# Patient Record
Sex: FEMALE | Race: OTHER | NOT HISPANIC OR LATINO | Employment: UNEMPLOYED | URBAN - METROPOLITAN AREA
[De-identification: names, ages, dates, MRNs, and addresses within clinical notes are randomized per-mention and may not be internally consistent; named-entity substitution may affect disease eponyms.]

---

## 2020-01-15 ENCOUNTER — EVALUATION (OUTPATIENT)
Dept: PHYSICAL THERAPY | Facility: CLINIC | Age: 55
End: 2020-01-15
Payer: MEDICARE

## 2020-01-15 ENCOUNTER — TRANSCRIBE ORDERS (OUTPATIENT)
Dept: PHYSICAL THERAPY | Facility: CLINIC | Age: 55
End: 2020-01-15

## 2020-01-15 DIAGNOSIS — I89.0 CHRONIC ACQUIRED LYMPHEDEMA: Primary | ICD-10-CM

## 2020-01-15 DIAGNOSIS — I89.0 LYMPHEDEMA: Primary | ICD-10-CM

## 2020-01-15 PROCEDURE — 97163 PT EVAL HIGH COMPLEX 45 MIN: CPT | Performed by: PHYSICAL THERAPIST

## 2020-01-15 NOTE — PROGRESS NOTES
PT Evaluation      Today's date: 1/15/2020  Patient name: Judson Becerra  : 1965  MRN: [id number]  Referring provider: Severo Hickman MD  Dx:   Encounter Diagnosis     ICD-10-CM    1  Lymphedema I89 0                   Assessment  Assessment details: Pt presents this day for assessment  Physical examination and review of PMHx + medical records collaborate diagnosis of R UE acquired lymphedema following R breast lumpectomy and ALND (11 LN per pt report) in   Pt has had PMHx of skilled CDT including MLD and compression to optimize volume reduction of R UE edema and not manages properly by executing phase 2 CDT protocol- self management, including daily usage of R UE compression pump complimented by daytime compression sleeve and daily skin hydration maintenance including self checks to minimize infection risk  Pt is well educated in self management of her lymphedema diagnosis and maintains R UE girth measurements comparable to her unaffected limb, however palpable heaviness is observed in R posterior upper arm and also along lateral aspect of forearm - common regions for UE edema to accumulate  Pt's report of fluctuating edema is common for phase 1 to phase 2 lymphedema which requires regular care to prevent worsening of sx's  Primary concern is pt's need to maintain adequate compression thru use of daytime compression sleeves which per standards of care suggest fitment of new garments within every 6 months  Also recommend repeat measurement f/u as needed  Impairments: activity intolerance  Other impairment: R UE edema  Understanding of Dx/Px/POC: good   Prognosis: good    Goals  ST  Pt maintains I in phase 2 CDT protocol   2  Pt f/u for repeat measurments prn  3   Pt to obtain new, appropriate compression sleeve for R UE within 3 wks    Plan  Planned therapy interventions: compression and manual therapy  Frequency: 1x month  Duration in visits: 1  Duration in weeks: 4  Plan of Care beginning date: 1/15/2020  Plan of Care expiration date: 2020  Treatment plan discussed with: patient        Subjective Evaluation    History of Present Illness  Mechanism of injury: surgery  Mechanism of injury: Pt presents admitting chronic hx of R UE lymphedema directly related to her R breast CA in '05 w/ lumpectomy and ALND performed to address  Pt also admits mets to her liver and spine along w/ a chronic CHF associated w/ her initial CA diagnosis  Currently maintains chemotherapy Rx medication and diuretic as needed  Pt reports daily usage of R UE compression pump along w/ maintaining R UE compression sleeve daily  Concerned her garments are wearing out and not providing as effective compression due to the age of garment combined w/ wear from diligent daily use  Pt is R hand dominant  Admits bouts of tendonitis and also PN from elbow to wrist w/ tends to coincide w/ exacerbations of worsened edema  Often times sx's regarding edema and PN worsen dependent upon activity level which can limit functionality of R UE  Pain  Current pain rating: 3  At best pain ratin  At worst pain ratin  Quality: dull ache, tight and discomfort  Aggravating factors: lifting  Progression: worsening    Social Support    Employment status: not working  Hand dominance: right      Diagnostic Tests  No diagnostic tests performed  Patient Goals  Patient goals for therapy: decreased edema  Patient goal: Manage R UE lymphedema         Objective     General Comments: Ankle/Foot Comments   Physical assessment:  Good visualization of R wrist bony prominences, slight obscurity along dorsum of hand, R extensor tendons w/ slight reduction in creases of fingers compared to L UE  Palpation: Mild heaviness palpable R posterior upper arm (triceps region) w/ also mild heaviness R lateral to dorsal forearm    Skin Mobility: Good thru majority of R UE, Fair R forearm  Skin color: WNL skin pigmentation, Moderate dryness of skin  Pitting: trace  Wound presence: None  Wound size: n/a  Wound color: n/a  Stemmer's Sign:  -   Gait assessment: WNL  Transfer status: I  Ability to don/doff clothing/garments: I             Precautions: Hx R breast CA         Manual                                                                                   Exercise Diary                                                                                                                                                                                                                                                                                      Modalities

## 2020-01-15 NOTE — LETTER
January 15, 2020    Samina Pardo MD   [facility]  [address] [place] [id number]    Patient: Devi Coles   YOB: 1965   Date of Visit: 1/15/2020     Encounter Diagnosis     ICD-10-CM    1  Lymphedema I89 0        Dear Dr Leanna Romeo: Thank you for your recent referral of Devi Coles  Please review the attached evaluation summary from Kayley's recent visit  Please verify that you agree with the plan of care by signing the attached order  If you have any questions or concerns, please do not hesitate to call  I sincerely appreciate the opportunity to share in the care of one of your patients and hope to have another opportunity to work with you in the near future  Sincerely,    Martinez Castro, PT      Referring Provider:      I certify that I have read the below Plan of Care and certify the need for these services furnished under this plan of treatment while under my care  Samina Pardo MD  [address]  [place] [id number]  VIA Facsimile: [phone]          PT Evaluation      Today's date: 1/15/2020  Patient name: Devi Coles  : 1965  MRN: [id number]  Referring provider: Chele Jimenez MD  Dx:   Encounter Diagnosis     ICD-10-CM    1  Lymphedema I89 0                   Assessment  Assessment details: Pt presents this day for assessment  Physical examination and review of PMHx + medical records collaborate diagnosis of R UE acquired lymphedema following R breast lumpectomy and ALND (11 LN per pt report) in   Pt has had PMHx of skilled CDT including MLD and compression to optimize volume reduction of R UE edema and not manages properly by executing phase 2 CDT protocol- self management, including daily usage of R UE compression pump complimented by daytime compression sleeve and daily skin hydration maintenance including self checks to minimize infection risk    Pt is well educated in self management of her lymphedema diagnosis and maintains R UE girth measurements comparable to her unaffected limb, however palpable heaviness is observed in R posterior upper arm and also along lateral aspect of forearm - common regions for UE edema to accumulate  Pt's report of fluctuating edema is common for phase 1 to phase 2 lymphedema which requires regular care to prevent worsening of sx's  Primary concern is pt's need to maintain adequate compression thru use of daytime compression sleeves which per standards of care suggest fitment of new garments within every 6 months  Also recommend repeat measurement f/u as needed  Impairments: activity intolerance  Other impairment: R UE edema  Understanding of Dx/Px/POC: good   Prognosis: good    Goals  ST  Pt maintains I in phase 2 CDT protocol   2  Pt f/u for repeat measurments prn  3  Pt to obtain new, appropriate compression sleeve for R UE within 3 wks    Plan  Planned therapy interventions: compression and manual therapy  Frequency: 1x month  Duration in visits: 1  Duration in weeks: 4  Plan of Care beginning date: 1/15/2020  Plan of Care expiration date: 2020  Treatment plan discussed with: patient        Subjective Evaluation    History of Present Illness  Mechanism of injury: surgery  Mechanism of injury: Pt presents admitting chronic hx of R UE lymphedema directly related to her R breast CA in '05 w/ lumpectomy and ALND performed to address  Pt also admits mets to her liver and spine along w/ a chronic CHF associated w/ her initial CA diagnosis  Currently maintains chemotherapy Rx medication and diuretic as needed  Pt reports daily usage of R UE compression pump along w/ maintaining R UE compression sleeve daily  Concerned her garments are wearing out and not providing as effective compression due to the age of garment combined w/ wear from diligent daily use  Pt is R hand dominant    Admits bouts of tendonitis and also PN from elbow to wrist w/ tends to coincide w/ exacerbations of worsened edema  Often times sx's regarding edema and PN worsen dependent upon activity level which can limit functionality of R UE  Pain  Current pain rating: 3  At best pain ratin  At worst pain ratin  Quality: dull ache, tight and discomfort  Aggravating factors: lifting  Progression: worsening    Social Support    Employment status: not working  Hand dominance: right      Diagnostic Tests  No diagnostic tests performed  Patient Goals  Patient goals for therapy: decreased edema  Patient goal: Manage R UE lymphedema         Objective     General Comments: Ankle/Foot Comments   Physical assessment:  Good visualization of R wrist bony prominences, slight obscurity along dorsum of hand, R extensor tendons w/ slight reduction in creases of fingers compared to L UE  Palpation: Mild heaviness palpable R posterior upper arm (triceps region) w/ also mild heaviness R lateral to dorsal forearm  Skin Mobility: Good thru majority of R UE, Fair R forearm  Skin color: WNL skin pigmentation, Moderate dryness of skin  Pitting: trace  Wound presence: None  Wound size: n/a  Wound color: n/a  Stemmer's Sign:  -   Gait assessment: WNL  Transfer status: I  Ability to don/doff clothing/garments: I             Precautions: Hx R breast CA         Manual                                                                                   Exercise Diary                                                                                                                                                                                                                                                                                      Modalities

## 2020-01-29 ENCOUNTER — APPOINTMENT (EMERGENCY)
Dept: RADIOLOGY | Facility: HOSPITAL | Age: 55
DRG: 308 | End: 2020-01-29
Payer: MEDICARE

## 2020-01-29 ENCOUNTER — HOSPITAL ENCOUNTER (INPATIENT)
Facility: HOSPITAL | Age: 55
LOS: 13 days | Discharge: NURSING, REHAB OR LONG-TERM CARE | DRG: 308 | End: 2020-02-11
Attending: EMERGENCY MEDICINE | Admitting: EMERGENCY MEDICINE
Payer: MEDICARE

## 2020-01-29 DIAGNOSIS — Z51.5 PALLIATIVE CARE PATIENT: ICD-10-CM

## 2020-01-29 DIAGNOSIS — R79.89 ELEVATED LFTS: ICD-10-CM

## 2020-01-29 DIAGNOSIS — N17.9 AKI (ACUTE KIDNEY INJURY) (HCC): ICD-10-CM

## 2020-01-29 DIAGNOSIS — C50.919 METASTATIC BREAST CANCER (HCC): ICD-10-CM

## 2020-01-29 DIAGNOSIS — I47.2 V TACH (HCC): Primary | ICD-10-CM

## 2020-01-29 DIAGNOSIS — E87.1 HYPONATREMIA: ICD-10-CM

## 2020-01-29 DIAGNOSIS — I42.7 CARDIOMYOPATHY SECONDARY TO DRUG (HCC): ICD-10-CM

## 2020-01-29 DIAGNOSIS — R06.00 DOE (DYSPNEA ON EXERTION): ICD-10-CM

## 2020-01-29 DIAGNOSIS — I50.22 CHRONIC SYSTOLIC HEART FAILURE (HCC): ICD-10-CM

## 2020-01-29 DIAGNOSIS — Z51.5 HOSPICE CARE PATIENT: ICD-10-CM

## 2020-01-29 DIAGNOSIS — I95.9 HYPOTENSION: ICD-10-CM

## 2020-01-29 DIAGNOSIS — G89.3 CANCER ASSOCIATED PAIN: ICD-10-CM

## 2020-01-29 PROBLEM — R06.02 SOB (SHORTNESS OF BREATH): Status: ACTIVE | Noted: 2020-01-29

## 2020-01-29 PROBLEM — R79.1 SUPRATHERAPEUTIC INR: Status: ACTIVE | Noted: 2020-01-29

## 2020-01-29 PROBLEM — E11.9 TYPE 2 DIABETES MELLITUS WITHOUT COMPLICATION, WITHOUT LONG-TERM CURRENT USE OF INSULIN (HCC): Status: ACTIVE | Noted: 2020-01-29

## 2020-01-29 PROBLEM — I48.20 CHRONIC ATRIAL FIBRILLATION (HCC): Status: ACTIVE | Noted: 2020-01-29

## 2020-01-29 LAB
ALBUMIN SERPL BCP-MCNC: 2.2 G/DL (ref 3.5–5)
ALP SERPL-CCNC: 1165 U/L (ref 46–116)
ALT SERPL W P-5'-P-CCNC: 205 U/L (ref 12–78)
ANION GAP SERPL CALCULATED.3IONS-SCNC: 7 MMOL/L (ref 4–13)
APTT PPP: 89 SECONDS (ref 23–37)
AST SERPL W P-5'-P-CCNC: 519 U/L (ref 5–45)
ATRIAL RATE: 84 BPM
BASOPHILS # BLD MANUAL: 0 THOUSAND/UL (ref 0–0.1)
BASOPHILS NFR MAR MANUAL: 0 % (ref 0–1)
BILIRUB SERPL-MCNC: 4.11 MG/DL (ref 0.2–1)
BUN SERPL-MCNC: 25 MG/DL (ref 5–25)
CALCIUM SERPL-MCNC: 9.9 MG/DL (ref 8.3–10.1)
CHLORIDE SERPL-SCNC: 99 MMOL/L (ref 100–108)
CO2 SERPL-SCNC: 26 MMOL/L (ref 21–32)
CREAT SERPL-MCNC: 1.11 MG/DL (ref 0.6–1.3)
EOSINOPHIL # BLD MANUAL: 0.66 THOUSAND/UL (ref 0–0.4)
EOSINOPHIL NFR BLD MANUAL: 3 % (ref 0–6)
ERYTHROCYTE [DISTWIDTH] IN BLOOD BY AUTOMATED COUNT: 15.7 % (ref 11.6–15.1)
GFR SERPL CREATININE-BSD FRML MDRD: 57 ML/MIN/1.73SQ M
GLUCOSE SERPL-MCNC: 173 MG/DL (ref 65–140)
GLUCOSE SERPL-MCNC: 183 MG/DL (ref 65–140)
HCT VFR BLD AUTO: 30.8 % (ref 34.8–46.1)
HGB BLD-MCNC: 10.2 G/DL (ref 11.5–15.4)
INR PPP: 2.16 (ref 0.84–1.19)
INR PPP: >15 (ref 0.84–1.19)
LYMPHOCYTES # BLD AUTO: 12 % (ref 14–44)
LYMPHOCYTES # BLD AUTO: 2.65 THOUSAND/UL (ref 0.6–4.47)
MCH RBC QN AUTO: 34.8 PG (ref 26.8–34.3)
MCHC RBC AUTO-ENTMCNC: 33.1 G/DL (ref 31.4–37.4)
MCV RBC AUTO: 105 FL (ref 82–98)
METAMYELOCYTES NFR BLD MANUAL: 1 % (ref 0–1)
MONOCYTES # BLD AUTO: 2.21 THOUSAND/UL (ref 0–1.22)
MONOCYTES NFR BLD: 10 % (ref 4–12)
MYELOCYTES NFR BLD MANUAL: 2 % (ref 0–1)
NEUTROPHILS # BLD MANUAL: 15.9 THOUSAND/UL (ref 1.85–7.62)
NEUTS BAND NFR BLD MANUAL: 2 % (ref 0–8)
NEUTS SEG NFR BLD AUTO: 70 % (ref 43–75)
NRBC BLD AUTO-RTO: 3 /100 WBCS
NRBC BLD AUTO-RTO: 4 /100 WBC (ref 0–2)
P AXIS: 68 DEGREES
PLATELET # BLD AUTO: 328 THOUSANDS/UL (ref 149–390)
PLATELET BLD QL SMEAR: ADEQUATE
PMV BLD AUTO: 11 FL (ref 8.9–12.7)
POLYCHROMASIA BLD QL SMEAR: PRESENT
POTASSIUM SERPL-SCNC: 5.1 MMOL/L (ref 3.5–5.3)
PR INTERVAL: 124 MS
PROT SERPL-MCNC: 7.2 G/DL (ref 6.4–8.2)
PROTHROMBIN TIME: 23.6 SECONDS (ref 11.6–14.5)
PROTHROMBIN TIME: >120 SECONDS (ref 11.6–14.5)
QRS AXIS: -67 DEGREES
QRSD INTERVAL: 130 MS
QT INTERVAL: 380 MS
QTC INTERVAL: 449 MS
RBC # BLD AUTO: 2.93 MILLION/UL (ref 3.81–5.12)
RBC MORPH BLD: PRESENT
SODIUM SERPL-SCNC: 132 MMOL/L (ref 136–145)
T WAVE AXIS: 85 DEGREES
TROPONIN I SERPL-MCNC: <0.02 NG/ML
VENTRICULAR RATE: 84 BPM
WBC # BLD AUTO: 22.09 THOUSAND/UL (ref 4.31–10.16)

## 2020-01-29 PROCEDURE — 99223 1ST HOSP IP/OBS HIGH 75: CPT | Performed by: INTERNAL MEDICINE

## 2020-01-29 PROCEDURE — 85610 PROTHROMBIN TIME: CPT | Performed by: INTERNAL MEDICINE

## 2020-01-29 PROCEDURE — 36415 COLL VENOUS BLD VENIPUNCTURE: CPT | Performed by: EMERGENCY MEDICINE

## 2020-01-29 PROCEDURE — 82948 REAGENT STRIP/BLOOD GLUCOSE: CPT

## 2020-01-29 PROCEDURE — 71045 X-RAY EXAM CHEST 1 VIEW: CPT

## 2020-01-29 PROCEDURE — 99222 1ST HOSP IP/OBS MODERATE 55: CPT | Performed by: INTERNAL MEDICINE

## 2020-01-29 PROCEDURE — 99285 EMERGENCY DEPT VISIT HI MDM: CPT

## 2020-01-29 PROCEDURE — 84484 ASSAY OF TROPONIN QUANT: CPT | Performed by: EMERGENCY MEDICINE

## 2020-01-29 PROCEDURE — 80053 COMPREHEN METABOLIC PANEL: CPT | Performed by: EMERGENCY MEDICINE

## 2020-01-29 PROCEDURE — 85007 BL SMEAR W/DIFF WBC COUNT: CPT | Performed by: EMERGENCY MEDICINE

## 2020-01-29 PROCEDURE — 93005 ELECTROCARDIOGRAM TRACING: CPT

## 2020-01-29 PROCEDURE — 85730 THROMBOPLASTIN TIME PARTIAL: CPT | Performed by: EMERGENCY MEDICINE

## 2020-01-29 PROCEDURE — 85027 COMPLETE CBC AUTOMATED: CPT | Performed by: EMERGENCY MEDICINE

## 2020-01-29 PROCEDURE — 99285 EMERGENCY DEPT VISIT HI MDM: CPT | Performed by: EMERGENCY MEDICINE

## 2020-01-29 PROCEDURE — 85610 PROTHROMBIN TIME: CPT | Performed by: EMERGENCY MEDICINE

## 2020-01-29 PROCEDURE — 93010 ELECTROCARDIOGRAM REPORT: CPT | Performed by: INTERNAL MEDICINE

## 2020-01-29 RX ORDER — SPIRONOLACTONE 50 MG/1
50 TABLET, FILM COATED ORAL DAILY
Status: DISCONTINUED | OUTPATIENT
Start: 2020-01-30 | End: 2020-01-30

## 2020-01-29 RX ORDER — PANTOPRAZOLE SODIUM 40 MG/1
40 TABLET, DELAYED RELEASE ORAL
Status: DISCONTINUED | OUTPATIENT
Start: 2020-01-30 | End: 2020-02-11 | Stop reason: HOSPADM

## 2020-01-29 RX ORDER — CEPHALEXIN 500 MG/1
500 CAPSULE ORAL EVERY 6 HOURS SCHEDULED
COMMUNITY
End: 2020-02-11 | Stop reason: HOSPADM

## 2020-01-29 RX ORDER — METHYLPREDNISOLONE 4 MG/1
4 TABLET ORAL 2 TIMES DAILY
COMMUNITY
Start: 2020-01-21 | End: 2020-02-11 | Stop reason: HOSPADM

## 2020-01-29 RX ORDER — LOSARTAN POTASSIUM 25 MG/1
25 TABLET ORAL DAILY
Status: DISCONTINUED | OUTPATIENT
Start: 2020-01-30 | End: 2020-01-31

## 2020-01-29 RX ORDER — DULOXETIN HYDROCHLORIDE 30 MG/1
30 CAPSULE, DELAYED RELEASE ORAL DAILY
COMMUNITY

## 2020-01-29 RX ORDER — PRAVASTATIN SODIUM 40 MG
40 TABLET ORAL
Status: DISCONTINUED | OUTPATIENT
Start: 2020-01-30 | End: 2020-01-29

## 2020-01-29 RX ORDER — OXYCODONE HYDROCHLORIDE 5 MG/1
5 CAPSULE ORAL EVERY 4 HOURS PRN
COMMUNITY
End: 2020-02-11 | Stop reason: HOSPADM

## 2020-01-29 RX ORDER — ASPIRIN 81 MG/1
81 TABLET ORAL DAILY
Status: DISCONTINUED | OUTPATIENT
Start: 2020-01-30 | End: 2020-02-11 | Stop reason: HOSPADM

## 2020-01-29 RX ORDER — ROSUVASTATIN CALCIUM 5 MG/1
5 TABLET, COATED ORAL DAILY
COMMUNITY

## 2020-01-29 RX ORDER — CARVEDILOL 6.25 MG/1
6.25 TABLET ORAL 2 TIMES DAILY WITH MEALS
COMMUNITY
End: 2020-02-11 | Stop reason: HOSPADM

## 2020-01-29 RX ORDER — TORSEMIDE 20 MG/1
20 TABLET ORAL DAILY PRN
COMMUNITY
End: 2020-02-11 | Stop reason: HOSPADM

## 2020-01-29 RX ORDER — POLYETHYLENE GLYCOL 3350 17 G/17G
17 POWDER, FOR SOLUTION ORAL DAILY
Status: DISCONTINUED | OUTPATIENT
Start: 2020-01-30 | End: 2020-02-11 | Stop reason: HOSPADM

## 2020-01-29 RX ORDER — POTASSIUM CHLORIDE 750 MG/1
20 TABLET, EXTENDED RELEASE ORAL DAILY
COMMUNITY
End: 2020-02-11 | Stop reason: HOSPADM

## 2020-01-29 RX ORDER — SPIRONOLACTONE 50 MG/1
50 TABLET, FILM COATED ORAL DAILY
COMMUNITY
End: 2020-02-11 | Stop reason: HOSPADM

## 2020-01-29 RX ORDER — OXYCODONE HYDROCHLORIDE 10 MG/1
10 TABLET ORAL EVERY 6 HOURS PRN
Status: DISCONTINUED | OUTPATIENT
Start: 2020-01-29 | End: 2020-01-31

## 2020-01-29 RX ORDER — LEVOTHYROXINE SODIUM 88 UG/1
88 TABLET ORAL
Status: DISCONTINUED | OUTPATIENT
Start: 2020-01-30 | End: 2020-02-11 | Stop reason: HOSPADM

## 2020-01-29 RX ORDER — POLYETHYLENE GLYCOL 3350 17 G/17G
17 POWDER, FOR SOLUTION ORAL DAILY
COMMUNITY

## 2020-01-29 RX ORDER — ASPIRIN 81 MG/1
81 TABLET ORAL DAILY
COMMUNITY

## 2020-01-29 RX ORDER — DEXLANSOPRAZOLE 60 MG/1
60 CAPSULE, DELAYED RELEASE ORAL DAILY
COMMUNITY

## 2020-01-29 RX ORDER — DIGOXIN 250 MCG
125 TABLET ORAL DAILY
COMMUNITY
End: 2020-02-11 | Stop reason: HOSPADM

## 2020-01-29 RX ORDER — POTASSIUM CHLORIDE 20 MEQ/1
20 TABLET, EXTENDED RELEASE ORAL DAILY
Status: DISCONTINUED | OUTPATIENT
Start: 2020-01-30 | End: 2020-01-31

## 2020-01-29 RX ORDER — MONTELUKAST SODIUM 10 MG/1
10 TABLET ORAL
Status: DISCONTINUED | OUTPATIENT
Start: 2020-01-29 | End: 2020-02-11 | Stop reason: HOSPADM

## 2020-01-29 RX ORDER — MONTELUKAST SODIUM 10 MG/1
10 TABLET ORAL
COMMUNITY

## 2020-01-29 RX ORDER — LEVOTHYROXINE SODIUM 0.07 MG/1
88 TABLET ORAL DAILY
COMMUNITY

## 2020-01-29 RX ORDER — MELATONIN
5000 DAILY
COMMUNITY

## 2020-01-29 RX ORDER — MEXILETINE HYDROCHLORIDE 150 MG/1
150 CAPSULE ORAL 3 TIMES DAILY
Status: DISCONTINUED | OUTPATIENT
Start: 2020-01-29 | End: 2020-02-11 | Stop reason: HOSPADM

## 2020-01-29 RX ORDER — TORSEMIDE 20 MG/1
20 TABLET ORAL DAILY PRN
Status: DISCONTINUED | OUTPATIENT
Start: 2020-01-29 | End: 2020-01-31

## 2020-01-29 RX ORDER — WARFARIN SODIUM 5 MG/1
TABLET ORAL
COMMUNITY

## 2020-01-29 RX ORDER — ONDANSETRON 2 MG/ML
1 INJECTION INTRAMUSCULAR; INTRAVENOUS ONCE
Status: COMPLETED | OUTPATIENT
Start: 2020-01-29 | End: 2020-01-29

## 2020-01-29 RX ORDER — MEXILETINE HYDROCHLORIDE 150 MG/1
150 CAPSULE ORAL 3 TIMES DAILY
COMMUNITY

## 2020-01-29 RX ORDER — CARVEDILOL 6.25 MG/1
6.25 TABLET ORAL 2 TIMES DAILY WITH MEALS
Status: DISCONTINUED | OUTPATIENT
Start: 2020-01-30 | End: 2020-02-01

## 2020-01-29 RX ORDER — DULOXETIN HYDROCHLORIDE 30 MG/1
30 CAPSULE, DELAYED RELEASE ORAL DAILY
Status: DISCONTINUED | OUTPATIENT
Start: 2020-01-30 | End: 2020-01-30

## 2020-01-29 RX ORDER — DIGOXIN 125 MCG
125 TABLET ORAL DAILY
Status: DISCONTINUED | OUTPATIENT
Start: 2020-01-30 | End: 2020-02-01

## 2020-01-29 RX ORDER — LOSARTAN POTASSIUM 25 MG/1
25 TABLET ORAL DAILY
COMMUNITY
End: 2020-02-11 | Stop reason: HOSPADM

## 2020-01-29 RX ORDER — OXYCODONE HYDROCHLORIDE 5 MG/1
5 TABLET ORAL EVERY 4 HOURS PRN
Status: DISCONTINUED | OUTPATIENT
Start: 2020-01-29 | End: 2020-02-11 | Stop reason: HOSPADM

## 2020-01-29 RX ADMIN — INSULIN LISPRO 1 UNITS: 100 INJECTION, SOLUTION INTRAVENOUS; SUBCUTANEOUS at 23:15

## 2020-01-29 RX ADMIN — PHYTONADIONE 5 MG: 10 INJECTION, EMULSION INTRAMUSCULAR; INTRAVENOUS; SUBCUTANEOUS at 15:36

## 2020-01-29 RX ADMIN — OXYCODONE HYDROCHLORIDE 10 MG: 10 TABLET ORAL at 20:11

## 2020-01-29 NOTE — ASSESSMENT & PLAN NOTE
No results found for: HGBA1C    No results for input(s): POCGLU in the last 72 hours      Blood Sugar Average: Last 72 hrs:    SSI, accuchecks

## 2020-01-29 NOTE — ED ATTENDING ATTESTATION
1/29/2020  IWilian MD, saw and evaluated the patient  I have discussed the patient with the resident/non-physician practitioner and agree with the resident's/non-physician practitioner's findings, Plan of Care, and MDM as documented in the resident's/non-physician practitioner's note, except where noted  All available labs and Radiology studies were reviewed  I was present for key portions of any procedure(s) performed by the resident/non-physician practitioner and I was immediately available to provide assistance  At this point I agree with the current assessment done in the Emergency Department  I have conducted an independent evaluation of this patient a history and physical is as follows:    ED Course     52-year-old female, history of metastatic breast cancer, history of dilated cardiomyopathy secondary to Adriamycin administration early on during her breast cancer history, history of V-tach, AICD placed, baseline EF is 13%  Patient is presenting to the emergency department for evaluation of increased shortness of breath on exertion  Additionally when the patient was in the ambulance coming to the hospital, she had a 5 beat run of V-tach  Patient denies any chest pain, but states that shortness of breath episodes are typically indication of congestive heart failure for her  No abdominal pain, nausea, vomiting, diarrhea  Ten systems reviewed negative except as noted in history of present illness  The patient is resting comfortably on a stretcher in no acute respiratory distress  The patient appears nontoxic  HEENT reveals moist mucous membranes  Head is normocephalic and atraumatic  Conjunctiva and sclera are normal  Neck is nontender and supple with full range of motion to flexion, extension, lateral rotation  No meningismus appreciated  No masses are appreciated  Lungs are clear to auscultation bilaterally without any wheezes, rales or rhonchi   Heart is regular rate and rhythm without any murmurs, rubs or gallops  Abdomen is soft and nontender without any rebound or guarding  Extremities appear grossly normal without any significant arthropathy  Patient is awake, alert, and oriented x3  The patient has normal interaction  Motor is 5 out of 5  Labs Reviewed   CBC AND DIFFERENTIAL - Abnormal       Result Value Ref Range Status    WBC 22 09 (*) 4 31 - 10 16 Thousand/uL Final    RBC 2 93 (*) 3 81 - 5 12 Million/uL Final    Hemoglobin 10 2 (*) 11 5 - 15 4 g/dL Final    Hematocrit 30 8 (*) 34 8 - 46 1 % Final     (*) 82 - 98 fL Final    MCH 34 8 (*) 26 8 - 34 3 pg Final    MCHC 33 1  31 4 - 37 4 g/dL Final    RDW 15 7 (*) 11 6 - 15 1 % Final    MPV 11 0  8 9 - 12 7 fL Final    Platelets 450  296 - 390 Thousands/uL Final    nRBC 3  /100 WBCs Final    Comment: This is an appended report  These results have been appended to a previously preliminary verified report  Narrative: This is an appended report  These results have been appended to a previously verified report  COMPREHENSIVE METABOLIC PANEL - Abnormal    Sodium 132 (*) 136 - 145 mmol/L Final    Potassium 5 1  3 5 - 5 3 mmol/L Final    Comment: Slightly Hemolyzed; Results May be Affected    Chloride 99 (*) 100 - 108 mmol/L Final    CO2 26  21 - 32 mmol/L Final    ANION GAP 7  4 - 13 mmol/L Final    BUN 25  5 - 25 mg/dL Final    Creatinine 1 11  0 60 - 1 30 mg/dL Final    Comment: Standardized to IDMS reference method    Glucose 173 (*) 65 - 140 mg/dL Final    Comment:   If the patient is fasting, the ADA then defines impaired fasting glucose as > 100 mg/dL and diabetes as > or equal to 123 mg/dL  Specimen collection should occur prior to Sulfasalazine administration due to the potential for falsely depressed results  Specimen collection should occur prior to Sulfapyridine administration due to the potential for falsely elevated results      Calcium 9 9  8 3 - 10 1 mg/dL Final     (*) 5 - 45 U/L Final Comment: Slightly Hemolyzed; Results May be Affected  Specimen collection should occur prior to Sulfasalazine administration due to the potential for falsely depressed results   (*) 12 - 78 U/L Final    Comment:   Specimen collection should occur prior to Sulfasalazine and/or Sulfapyridine administration due to the potential for falsely depressed results  Alkaline Phosphatase 1,165 (*) 46 - 116 U/L Final    Total Protein 7 2  6 4 - 8 2 g/dL Final    Albumin 2 2 (*) 3 5 - 5 0 g/dL Final    Total Bilirubin 4 11 (*) 0 20 - 1 00 mg/dL Final    eGFR 57  ml/min/1 73sq m Final    Narrative:     [place] guidelines for Chronic Kidney Disease (CKD):     Stage 1 with normal or high GFR (GFR > 90 mL/min/1 73 square meters)    Stage 2 Mild CKD (GFR = 60-89 mL/min/1 73 square meters)    Stage 3A Moderate CKD (GFR = 45-59 mL/min/1 73 square meters)    Stage 3B Moderate CKD (GFR = 30-44 mL/min/1 73 square meters)    Stage 4 Severe CKD (GFR = 15-29 mL/min/1 73 square meters)    Stage 5 End Stage CKD (GFR <15 mL/min/1 73 square meters)  Note: GFR calculation is accurate only with a steady state creatinine   APTT - Abnormal    PTT 89 (*) 23 - 37 seconds Final    Comment: Therapeutic Heparin Range =  60-90 seconds   PROTIME-INR - Abnormal    Protime >120 0 (*) 11 6 - 14 5 seconds Final    INR >15 00 (*) 0 84 - 1 19 Final    Comment: Unable to calculate     MANUAL DIFFERENTIAL(PHLEBS DO NOT ORDER) - Abnormal    Segmented % 70  43 - 75 % Final    Bands % 2  0 - 8 % Final    Lymphocytes % 12 (*) 14 - 44 % Final    Monocytes % 10  4 - 12 % Final    Eosinophils, % 3  0 - 6 % Final    Basophils % 0  0 - 1 % Final    Metamyelocytes% 1  0 - 1 % Final    Myelocytes % 2 (*) 0 - 1 % Final    Absolute Neutrophils 15 90 (*) 1 85 - 7 62 Thousand/uL Final    Lymphocytes Absolute 2 65  0 60 - 4 47 Thousand/uL Final    Monocytes Absolute 2 21 (*) 0 00 - 1 22 Thousand/uL Final    Eosinophils Absolute 0 66 (*) 0 00 - 0 40 Thousand/uL Final    Basophils Absolute 0 00  0 00 - 0 10 Thousand/uL Final    Total Counted     Final    nRBC 4 (*) 0 - 2 /100 WBC Final    RBC Morphology Present   Final    Polychromasia Present   Final    Platelet Estimate Adequate  Adequate Final   TROPONIN I - Normal    Troponin I <0 02  <=0 04 ng/mL Final    Comment:   [employer] Chemistry analyzer 99% cutoff is > 0 04 ng/mL in network labs     o cTnI 99% cutoff is useful only when applied to patients in the clinical setting of myocardial ischemia   o cTnI 99% cutoff should be interpreted in the context of clinical history, ECG findings and possibly cardiac imaging to establish correct diagnosis  o cTnI 99% cutoff may be suggestive but clearly not indicative of a coronary event without the clinical setting of myocardial ischemia  XR chest 1 view portable   ED Interpretation   No acute cardiopulmonary disease      Final Result   Typical left-sided ICD      No acute cardiopulmonary disease  Findings are consistent with emergency provider's preliminary reading                     Workstation performed: [id number]           Patient found to have elevated INR  Patient will need further evaluation for worsening heart failure as well as monitoring for V-tach      Critical Care Time  Procedures

## 2020-01-29 NOTE — ASSESSMENT & PLAN NOTE
· Patient states that prior shortness of breath has been associated with V-tach in the past, as per EMS she had 5 beat run of V-tach  · Chest x-ray shows no acute cardiopulmonary pathology  · Await ICD interrogation  · Continue telemetry  · Cardiology consulted

## 2020-01-29 NOTE — ASSESSMENT & PLAN NOTE
Chronic as per her cardiologist  Increased as per last labs, to be faxed here  Likely due to mets  Will recheck in am  Avoid hepatotoxic drugs

## 2020-01-29 NOTE — H&P
H&P- Fer Aguilar 1965, 49 y o  female MRN: [id number]    Unit/Bed#: ED 29 Encounter: [id number]    Primary Care Provider: No primary care provider on file  Date and time admitted to hospital: 1/29/2020 11:33 AM        Type 2 diabetes mellitus without complication, without long-term current use of insulin (HCC)  Assessment & Plan  No results found for: HGBA1C    No results for input(s): POCGLU in the last 72 hours      Blood Sugar Average: Last 72 hrs:    SSI, accuchecks    Cardiomyopathy secondary to drug [facility])  Assessment & Plan  As above  Secondary to chemotherapy     Chronic systolic heart failure (HCC)  Assessment & Plan  Wt Readings from Last 3 Encounters:   No data found for Wt     Appears euvolemic  Continue Demadex and Ivabradine, daily weights, 2g Na restricted diet, 2L fluid restrict  EF < 25%  ICD in place    Elevated LFTs  Assessment & Plan  Chronic as per her cardiologist  Increased as per last labs, to be faxed here  Likely due to mets  Will recheck in am  Avoid hepatotoxic drugs    Supratherapeutic INR  Assessment & Plan  Given 5 mg IV vitamin K in ER  Recheck this evening and the morning    Metastatic breast cancer [facility])  Assessment & Plan  Aware    Chronic atrial fibrillation  Assessment & Plan  Rate controlled  Continue home meds  Hold anticoagulation as INR is supratherapeutic    Ventricular tachycardia ([address] )  Assessment & Plan  Await ICD interrogation  As above    * SOB (shortness of breath)  Assessment & Plan  · Patient states that prior shortness of breath has been associated with V-tach in the past, as per EMS she had 5 beat run of V-tach  · Chest x-ray shows no acute cardiopulmonary pathology  · Await ICD interrogation  · Continue telemetry  · Cardiology consulted      VTE Prophylaxis: Pharmacologic VTE Prophylaxis contraindicated due to Supratherapeutic INR  / sequential compression device   Code Status:  Full code  POLST: There is no POLST form on file for this patient (pre-hospital)  Discussion with family:  Patient    Anticipated Length of Stay:  Patient will be admitted on an Inpatient basis with an anticipated length of stay of  more than 2 midnights  Justification for Hospital Stay:  Possible V-tach, supratherapeutic INR    Total Time for Visit, including Counseling / Coordination of Care: 45 minutes  Greater than 50% of this total time spent on direct patient counseling and coordination of care  Chief Complaint:   Dyspnea on exertion    History of Present Illness:    Krys Fish is a 49 y o  female with a past medical history of breast cancer with metastasis, atrial fibrillation, chronic systolic heart failure who presents with dyspnea on exertion  Patient states in the past when she has had dyspnea on exertion is usually due to ventricular tachycardia and when she has had her ICD interrogated that is what it showed  She states that she has been shocked by her AICD in the past and she has not felt it  She called EMS and in the ambulance she had a 5 beat run of V-tach  She states that last week she had her INR checked and was 1 7 and in the ER today it is more than 15  She states that she has not changed her Coumadin dose  Her cardiologist and oncologist are at [facility]  I spoke with her cardiology nurse who confirmed her medications and story  Review of Systems:    Review of Systems   Constitutional: Negative for activity change, appetite change, diaphoresis, fatigue and fever  HENT: Negative for congestion, rhinorrhea, sinus pressure and trouble swallowing  Eyes: Negative  Respiratory: Positive for shortness of breath  Negative for chest tightness  Cardiovascular: Negative for chest pain, palpitations and leg swelling  Gastrointestinal: Negative for abdominal distention, blood in stool, constipation, diarrhea, nausea and vomiting  Endocrine: Negative  Genitourinary: Negative for difficulty urinating, flank pain, genital sores and urgency  Musculoskeletal: Negative  Skin: Negative  Allergic/Immunologic: Negative  Neurological: Negative for dizziness, syncope, weakness and light-headedness  Hematological: Negative  Psychiatric/Behavioral: Negative  All other systems reviewed and are negative  Past Medical and Surgical History:     Past Medical History:   Diagnosis Date    Breast cancer metastasized to bone, unspecified laterality ([facility] )     Cancer ([place] [address] )     breast 2005, mets to bones 2015    Cardiac disease     Chemotherapy follow-up examination     [facility]congestive heart failure) ([facility])     Diabetes mellitus ([place] [address] )     Disease of thyroid gland     Hyperlipidemia        Past Surgical History:   Procedure Laterality Date    BREAST SURGERY      CARDIAC PACEMAKER PLACEMENT      HYSTERECTOMY         Meds/Allergies:    Prior to Admission medications    Not on File     I have reviewed home medications with a medical source (PCP, Pharmacy, other)  Allergies:    Allergies   Allergen Reactions    Ambien [Zolpidem]     Other      Other reaction(s): Unknown  Adhesive Tape    Bee Pollen Rash     Pollen       Social History:     Marital Status: Single   Occupation:  Retired  Patient Pre-hospital Living Situation:  With friend  Patient Pre-hospital Level of Mobility:  Independent  Patient Pre-hospital Diet Restrictions:  Sodium restricted  Substance Use History:   Social History     Substance and Sexual Activity   Alcohol Use Not Currently     Social History     Tobacco Use   Smoking Status Never Smoker   Smokeless Tobacco Never Used     Social History     Substance and Sexual Activity   Drug Use Not Currently       Family History:    Family History   Problem Relation Age of Onset    Cancer Mother     Heart disease Father     Diabetes Brother        Physical Exam:     Vitals:   Blood Pressure: 114/56 (01/29/20 1536)  Pulse: 78 (01/29/20 1536)  Respirations: 22 (01/29/20 1536)  SpO2: 98 % (01/29/20 1534)    Physical Exam Constitutional: She is oriented to person, place, and time  She appears well-developed and well-nourished  HENT:   Head: Normocephalic and atraumatic  Mouth/Throat: Oropharynx is clear and moist    Eyes: Pupils are equal, round, and reactive to light  Conjunctivae are normal    Neck: Neck supple  No JVD present  Cardiovascular: Normal rate, regular rhythm, normal heart sounds and intact distal pulses  Pulmonary/Chest: Effort normal and breath sounds normal    Abdominal: Soft  Bowel sounds are normal    Musculoskeletal: She exhibits no edema or tenderness  Neurological: She is alert and oriented to person, place, and time  Skin: Skin is warm and dry  Capillary refill takes less than 2 seconds  Psychiatric: She has a normal mood and affect  Her behavior is normal  Judgment and thought content normal    Nursing note and vitals reviewed  Additional Data:     Lab Results: I have personally reviewed pertinent reports  Results from last 7 days   Lab Units 01/29/20  1150   WBC Thousand/uL 22 09*   HEMOGLOBIN g/dL 10 2*   HEMATOCRIT % 30 8*   PLATELETS Thousands/uL 328   BANDS PCT % 2   LYMPHO PCT % 12*   MONO PCT % 10   EOS PCT % 3     Results from last 7 days   Lab Units 01/29/20  1150   SODIUM mmol/L 132*   POTASSIUM mmol/L 5 1   CHLORIDE mmol/L 99*   CO2 mmol/L 26   BUN mg/dL 25   CREATININE mg/dL 1 11   ANION GAP mmol/L 7   CALCIUM mg/dL 9 9   ALBUMIN g/dL 2 2*   TOTAL BILIRUBIN mg/dL 4 11*   ALK PHOS U/L 1,165*   ALT U/L 205*   AST U/L 519*   GLUCOSE RANDOM mg/dL 173*     Results from last 7 days   Lab Units 01/29/20  1154   INR  >15 00*                   Imaging: I have personally reviewed pertinent reports  XR chest 1 view portable   ED Interpretation by Gurjit Hamlin DO (01/29 1316)   No acute cardiopulmonary disease      Final Result by Pascual Lucas MD (01/29 6989)   Typical left-sided ICD      No acute cardiopulmonary disease            Findings are consistent with emergency provider's preliminary reading                     Workstation performed: [id number]             EKG, Pathology, and Other Studies Reviewed on Admission:   · EKG:  Ventricularly paced    Allscripts / Epic Records Reviewed: Yes     ** Please Note: This note has been constructed using a voice recognition system   **

## 2020-01-29 NOTE — CONSULTS
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  -TTE 11/1/18 at [facility] per report: LVEF 15%, LVEDd 6 3cm, grade 2 DD, RV mild dysfunction,   -home regimen: ASA, Coreg 6 25mg, Ivabradine 5mg, spironolactone 50mg; she is off a loop diuretic as of 1-2 months ago  # VT, LBBB s/p BiV ICD, on mixiletine  # Hx of RA thrombus on Coumadin  -denies hx of AF/AFL, no documentation of such in EMR  # Metastatic breast CA to bone, liver  # Transaminitis, elevated ALP  -progressive over the last few months  # Supratherapeutic INR 2/2 above, recent nose bleeds  -s/p 5mg of VitK        Plan:  1  Will obtain 2D echo to rule out RV dysfunction, PH  Will obtain interrogation of BiV to rule out arrhythmogenic etiology (though this is less likely)  2  Recommend evaluation by GI for worsening transaminitis, synthetic liver function  3  Monitor on telemetry  4  Resume OP cardiac therapy with the exception of statin  HPI: Yan Mora 49y o  year old female with a 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, VT with ICD discharge in past on mixeletine, 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, LBBB, metastatic breast CA s/p adjuvant endocrine therapy/fulvestrant/palbociclib who presented today with dyspnea  She states it occurred rather suddenly and persisted throughout the day  She admits to chronic intermittent dizziness including today  Denies palpitations, syncope, pleurisy, orthopnea, PND, LE edema, chest pain   She measures her weight daily and it has gone done  She keeps well hydrated  There have been no sick contacts, no recent changes in rx  She states she has a hx of 5 ICD discharges for VT, but none in the last year  She believes she didn't feel a few of them and is concerned something similar has happened  Lab work: K 5 1, Cr 1 1, , , ALP 1100, troponin -ve x1, WBC 22, Hgb 10 2  INR >15 (given 5mg IV VitK)  Family History: non-contributory  Historical Information   Past Medical History:   Diagnosis Date    [facility] cancer metastasized to bone, unspecified laterality [facility] )     Cancer [facility] )     breast 2005, mets to bones 2015    Cardiac disease     Chemotherapy follow-up examination     CHF (congestive heart failure) (HCC)     Diabetes mellitus (HCC)     Disease of thyroid gland     Hyperlipidemia      Past Surgical History:   Procedure Laterality Date    BREAST SURGERY      CARDIAC PACEMAKER PLACEMENT      HYSTERECTOMY       Social History   Social History     Substance and Sexual Activity   Alcohol Use Not Currently     Social History     Substance and Sexual Activity   Drug Use Not Currently     Social History     Tobacco Use   Smoking Status Never Smoker   Smokeless Tobacco Never Used     Family History:   Family History   Problem Relation Age of Onset    Cancer Mother     Heart disease Father     Diabetes Brother        Review of Systems:  Review of Systems  Except as noted in the HPI and above, a comprehensive 14 point review of systems was negative  Scheduled Meds:  Current Facility-Administered Medications:  phytonadione 5 mg Intravenous Once Rizwan Aponte,      Continuous Infusions:   PRN Meds:   all current active meds have been reviewed    Allergies   Allergen Reactions    Ambien [Zolpidem]     Other      Other reaction(s): Unknown  Adhesive Tape    Bee Pollen Rash     Pollen       Objective   Vitals: Blood pressure 129/55, pulse 78, resp  rate 15, SpO2 97 %  , There is no height or weight on file to calculate BMI , Orthostatic Blood Pressures      Most Recent Value   Blood Pressure  129/55 filed at 01/29/2020 1313   Patient Position - Orthostatic VS  Lying filed at 01/29/2020 1313          No intake or output data in the 24 hours ending 01/29/20 1518    Invasive Devices     Peripheral Intravenous Line            Peripheral IV 01/29/20 Left Foot less than 1 day                Physical Exam:  Physical Exam   Constitutional: She is oriented to person, place, and time  She appears well-developed and well-nourished  No distress  HENT:   Head: Normocephalic and atraumatic  Eyes: Pupils are equal, round, and reactive to light  Conjunctivae and EOM are normal    Neck: Normal range of motion  Neck supple  No JVD present  Cardiovascular: Normal rate, regular rhythm, normal heart sounds and intact distal pulses  Exam reveals no gallop and no friction rub  No murmur heard  Pulmonary/Chest: Effort normal and breath sounds normal  She has no wheezes  She has no rales  Abdominal: Soft  She exhibits no distension  There is no rebound and no guarding  Musculoskeletal: Normal range of motion  She exhibits edema (trace)  Neurological: She is alert and oriented to person, place, and time  Skin: Skin is warm and dry  Capillary refill takes less than 2 seconds  She is not diaphoretic  No pallor  Psychiatric: She has a normal mood and affect  Her behavior is normal        Lab Results: I have personally reviewed pertinent lab results  Imaging: I have personally reviewed pertinent reports  EKG: Personally reviewed   AS,   ECHO: reviewed report at [facility]  Previous Stress/Cath/PCI: n/a  Code Status: No Order  Advance Directive and Living Will:      Power of :    POLST:        Charleen Knowles MD  Cardiology Fellow

## 2020-01-29 NOTE — ASSESSMENT & PLAN NOTE
Wt Readings from Last 3 Encounters:   No data found for Wt     Appears euvolemic  Continue Demadex and Ivabradine, daily weights, 2g Na restricted diet, 2L fluid restrict  EF < 25%  ICD in place

## 2020-01-29 NOTE — ED PROVIDER NOTES
History  Chief Complaint   Patient presents with    Shortness of Breath     Pt state that she was leaving the house and "felt weird" with increased SOB on exertion  Pt had run of [facility] in route to hospital and felt dizzy     Patient is a 69-year-old female with a history of breast cancer with liver and bone Mets, chemotherapy-induced cardiomyopathy with ICD placement, who presents for increased dyspnea on exertion  Patient says that today she noticed she was getting more winded when she was walking up the stairs and exerting herself  She says that she is even getting short of breath with talking to me in the department  Patient denies feeling any shocks from her ICD  She says that she has been shocked 5 times in the past but is only felt at 1 time    According to EMS, patient had a 5 run beat of ventricular tachycardia that converted on its own  Patient says that this is normally how she presents when she has heart issues  Patient says that she was having nose bleeds on Monday and had her INR checked and it was unreadable    She denies any chest pain, headaches, abdominal pain, nausea, vomiting, increased leg swelling  Patient follows at [facility] [place] for her oncology and cardiac issues  None       Past Medical History:   Diagnosis Date    Cancer [facility])     breast 2005, mets to bones 2015    Cardiac disease     Chemotherapy follow-up examination     Diabetes mellitus ([facility] [address] )     Disease of thyroid gland     Hyperlipidemia        Past Surgical History:   Procedure Laterality Date    BREAST SURGERY      CARDIAC PACEMAKER PLACEMENT      HYSTERECTOMY         History reviewed  No pertinent family history  I have reviewed and agree with the history as documented      Social History     Tobacco Use    Smoking status: Never Smoker    Smokeless tobacco: Never Used   Substance Use Topics    Alcohol use: Not Currently    Drug use: Not Currently        Review of Systems Constitutional: Negative for chills, diaphoresis and fever  HENT: Negative for congestion, sinus pressure, sore throat and trouble swallowing  Eyes: Negative for pain, discharge and itching  Respiratory: Positive for shortness of breath  Negative for cough, chest tightness and wheezing  Cardiovascular: Negative for chest pain, palpitations and leg swelling  Gastrointestinal: Negative for abdominal distention, abdominal pain, blood in stool, diarrhea, nausea and vomiting  Endocrine: Negative for polyphagia and polyuria  Genitourinary: Negative for difficulty urinating, dysuria, flank pain, hematuria, pelvic pain and vaginal bleeding  Musculoskeletal: Negative for arthralgias and back pain  Skin: Negative for rash  Neurological: Positive for light-headedness  Negative for dizziness, syncope, weakness and headaches  Physical Exam  ED Triage Vitals   Temp Pulse Respirations Blood Pressure SpO2   -- 01/29/20 1215 01/29/20 1215 01/29/20 1313 01/29/20 1215    76 17 129/55 94 %      Temp src Heart Rate Source Patient Position - Orthostatic VS BP Location FiO2 (%)   -- -- 01/29/20 1250 01/29/20 1250 --     Lying Right arm       Pain Score       01/29/20 1250       No Pain             Orthostatic Vital Signs  Vitals:    01/29/20 1215 01/29/20 1250 01/29/20 1253 01/29/20 1313   BP:    129/55   Pulse: 76 74 74 78   Patient Position - Orthostatic VS:  Lying  Lying       Physical Exam   Constitutional: She is oriented to person, place, and time  She appears well-developed and well-nourished  No distress  HENT:   Head: Normocephalic and atraumatic  Right Ear: External ear normal    Left Ear: External ear normal    Mouth/Throat: No oropharyngeal exudate  Eyes: Pupils are equal, round, and reactive to light  Conjunctivae are normal    Neck: Normal range of motion  Neck supple  Cardiovascular: Normal rate, regular rhythm, normal heart sounds and intact distal pulses   Exam reveals no gallop and no friction rub  No murmur heard  Pulmonary/Chest: Effort normal and breath sounds normal  No respiratory distress  She has no wheezes  She has no rales  Abdominal: Soft  She exhibits no distension  There is no tenderness  There is no guarding  Musculoskeletal: Normal range of motion  She exhibits no edema, tenderness or deformity  Lymphadenopathy:     She has no cervical adenopathy  Neurological: She is alert and oriented to person, place, and time  No cranial nerve deficit or sensory deficit  She exhibits normal muscle tone  Skin: Skin is warm and dry  Psychiatric: She has a normal mood and affect  Nursing note and vitals reviewed        ED Medications  Medications   phytonadione (AQUA-MEPHYTON) 10 mg/mL 5 mg in sodium chloride 0 9 % 50 mL IVPB (has no administration in time range)   ondansetron (FOR EMS ONLY) (ZOFRAN) 4 mg/2 mL injection 4 mg (0 mg Does not apply Given to EMS 1/29/20 1342)       Diagnostic Studies  Results Reviewed     Procedure Component Value Units Date/Time    APTT [[id number]]  (Abnormal) Collected:  01/29/20 1154    Lab Status:  Final result Specimen:  Blood from Arm, Left Updated:  01/29/20 1255     PTT 89 seconds     Protime-INR [[id number]]  (Abnormal) Collected:  01/29/20 1154    Lab Status:  Final result Specimen:  Blood from Arm, Left Updated:  01/29/20 1255     Protime >120 0 seconds      INR >15 00    Comprehensive metabolic panel [[id number]]  (Abnormal) Collected:  01/29/20 1150    Lab Status:  Final result Specimen:  Blood from Foot, Left Updated:  01/29/20 1253     Sodium 132 mmol/L      Potassium 5 1 mmol/L      Chloride 99 mmol/L      CO2 26 mmol/L      ANION GAP 7 mmol/L      BUN 25 mg/dL      Creatinine 1 11 mg/dL      Glucose 173 mg/dL      Calcium 9 9 mg/dL       U/L       U/L      Alkaline Phosphatase 1,165 U/L      Total Protein 7 2 g/dL      Albumin 2 2 g/dL      Total Bilirubin 4 11 mg/dL      eGFR 57 ml/min/1 73sq m     Narrative:       Consolidated [employer] [employer] guidelines for Chronic Kidney Disease (CKD):     Stage 1 with normal or high GFR (GFR > 90 mL/min/1 73 square meters)    Stage 2 Mild CKD (GFR = 60-89 mL/min/1 73 square meters)    Stage 3A Moderate CKD (GFR = 45-59 mL/min/1 73 square meters)    Stage 3B Moderate CKD (GFR = 30-44 mL/min/1 73 square meters)    Stage 4 Severe CKD (GFR = 15-29 mL/min/1 73 square meters)    Stage 5 End Stage CKD (GFR <15 mL/min/1 73 square meters)  Note: GFR calculation is accurate only with a steady state creatinine    CBC and differential [[id number]]  (Abnormal) Collected:  01/29/20 1150    Lab Status:  Final result Specimen:  Blood from Foot, Left Updated:  01/29/20 1232     WBC 22 09 Thousand/uL      RBC 2 93 Million/uL      Hemoglobin 10 2 g/dL      Hematocrit 30 8 %       fL      MCH 34 8 pg      MCHC 33 1 g/dL      RDW 15 7 %      MPV 11 0 fL      Platelets 876 Thousands/uL      nRBC 3 /100 WBCs     Narrative: This is an appended report  These results have been appended to a previously verified report  Troponin I [[id number]]  (Normal) Collected:  01/29/20 1150    Lab Status:  Final result Specimen:  Blood from Arm, Left Updated:  01/29/20 1229     Troponin I <0 02 ng/mL                  XR chest 1 view portable   ED Interpretation by Robert Costello DO (01/29 1316)   No acute cardiopulmonary disease      Final Result by Oz Malhotra MD (01/29 3943)   Typical left-sided ICD      No acute cardiopulmonary disease            Findings are consistent with emergency provider's preliminary reading                     Workstation performed: [id number]               Procedures  ECG 12 Lead Documentation Only  Date/Time: 1/29/2020 12:03 PM  Performed by: Robert Costello DO  Authorized by: Robert Costello DO     Indications / Diagnosis:  Sob  ECG reviewed by me, the ED Provider: yes    Patient location:  ED  Previous ECG:     Previous ECG:  Unavailable    Comparison to cardiac monitor: Yes Interpretation:     Interpretation: abnormal    Rate:     ECG rate:  83    ECG rate assessment: normal    Rhythm:     Rhythm: paced    Pacing:     Capture:  Complete    Type of pacing:  Ventricular  Ectopy:     Ectopy: none    QRS:     QRS intervals: Wide  Conduction:     Conduction: abnormal      Abnormal conduction: non-specific intraventricular conduction delay    ST segments:     ST segments:  Non-specific  T waves:     T waves: non-specific            ED Course                               MDM  Number of Diagnoses or Management Options  PARKS (dyspnea on exertion):   V tach [facility]):   Diagnosis management comments: 60-year-old female with chemotherapy-induced cardiomyopathy with ICD, breast cancer with liver and bone Mets who presents for increased dyspnea on exertion  Had a 5 beat of V-tach per EMS  Patient is currently in a ventricularly paced rhythm  Her vitals are within normal limits  Will obtain cardiac workup, coags  Will interrogate pacemaker  Will obtain chest x-ray  Patient's INR elevated greater than 15  Will give 5 mg of vitamin K  Patient has elevated AST ALT likely from liver Mets, do not have old labs to compare to  Patient elevated white count which is likely secondary to breast cancer  Patient has remained hemodynamically stable in the department    Will admit to slim with tele          Disposition  Final diagnoses:   V tach ([address] )   PARKS (dyspnea on exertion)     Time reflects when diagnosis was documented in both MDM as applicable and the Disposition within this note     Time User Action Codes Description Comment    1/29/2020  1:35 PM Swati Rendon Add [I47 2] V tach ([address] )     1/29/2020  1:35 PM Swati Rendon Add [R06 09] PARKS (dyspnea on exertion)       ED Disposition     ED Disposition Condition Date/Time Comment    Admit Stable Wed Jan 29, 2020  1:35 PM Case was discussed with DAVID and the patient's admission status was agreed to be Admission Status: inpatient status to the service of Dr Jennifer Spicer   Follow-up Information    None         Patient's Medications    No medications on file     No discharge procedures on file  ED Provider  Attending physically available and evaluated Burnis Abby KAT managed the patient along with the ED Attending      Electronically Signed by         Bedelia Simmonds,   01/29/20 Shawn [address] Carl Reynolds,   01/29/20 6701

## 2020-01-29 NOTE — ED NOTES
Cardiologist at bedside  [employer] called again for interrogation report       Francisco Rodriguez, MARIANNE  01/29/20 [id number]  01/29/20 0976

## 2020-01-30 ENCOUNTER — APPOINTMENT (INPATIENT)
Dept: NON INVASIVE DIAGNOSTICS | Facility: HOSPITAL | Age: 55
DRG: 308 | End: 2020-01-30
Payer: MEDICARE

## 2020-01-30 ENCOUNTER — APPOINTMENT (INPATIENT)
Dept: RADIOLOGY | Facility: HOSPITAL | Age: 55
DRG: 308 | End: 2020-01-30
Payer: MEDICARE

## 2020-01-30 PROBLEM — E87.1 HYPONATREMIA: Status: ACTIVE | Noted: 2020-01-30

## 2020-01-30 LAB
ALBUMIN SERPL BCP-MCNC: 1.9 G/DL (ref 3.5–5)
ALP SERPL-CCNC: 995 U/L (ref 46–116)
ALT SERPL W P-5'-P-CCNC: 174 U/L (ref 12–78)
ANION GAP SERPL CALCULATED.3IONS-SCNC: 7 MMOL/L (ref 4–13)
AST SERPL W P-5'-P-CCNC: 469 U/L (ref 5–45)
BILIRUB SERPL-MCNC: 5 MG/DL (ref 0.2–1)
BUN SERPL-MCNC: 26 MG/DL (ref 5–25)
CALCIUM SERPL-MCNC: 9.3 MG/DL (ref 8.3–10.1)
CHLORIDE SERPL-SCNC: 95 MMOL/L (ref 100–108)
CO2 SERPL-SCNC: 26 MMOL/L (ref 21–32)
CREAT SERPL-MCNC: 0.89 MG/DL (ref 0.6–1.3)
GFR SERPL CREATININE-BSD FRML MDRD: 74 ML/MIN/1.73SQ M
GLUCOSE SERPL-MCNC: 136 MG/DL (ref 65–140)
GLUCOSE SERPL-MCNC: 137 MG/DL (ref 65–140)
GLUCOSE SERPL-MCNC: 141 MG/DL (ref 65–140)
GLUCOSE SERPL-MCNC: 143 MG/DL (ref 65–140)
GLUCOSE SERPL-MCNC: 202 MG/DL (ref 65–140)
INR PPP: 1.54 (ref 0.84–1.19)
POTASSIUM SERPL-SCNC: 4.3 MMOL/L (ref 3.5–5.3)
PROT SERPL-MCNC: 6.2 G/DL (ref 6.4–8.2)
PROTHROMBIN TIME: 18 SECONDS (ref 11.6–14.5)
SODIUM SERPL-SCNC: 128 MMOL/L (ref 136–145)

## 2020-01-30 PROCEDURE — 93306 TTE W/DOPPLER COMPLETE: CPT

## 2020-01-30 PROCEDURE — 99232 SBSQ HOSP IP/OBS MODERATE 35: CPT | Performed by: INTERNAL MEDICINE

## 2020-01-30 PROCEDURE — 76705 ECHO EXAM OF ABDOMEN: CPT

## 2020-01-30 PROCEDURE — 82948 REAGENT STRIP/BLOOD GLUCOSE: CPT

## 2020-01-30 PROCEDURE — 85610 PROTHROMBIN TIME: CPT | Performed by: INTERNAL MEDICINE

## 2020-01-30 PROCEDURE — 80053 COMPREHEN METABOLIC PANEL: CPT | Performed by: INTERNAL MEDICINE

## 2020-01-30 PROCEDURE — 93306 TTE W/DOPPLER COMPLETE: CPT | Performed by: INTERNAL MEDICINE

## 2020-01-30 RX ORDER — HEPARIN SODIUM 5000 [USP'U]/ML
5000 INJECTION, SOLUTION INTRAVENOUS; SUBCUTANEOUS EVERY 8 HOURS SCHEDULED
Status: DISCONTINUED | OUTPATIENT
Start: 2020-01-30 | End: 2020-02-11 | Stop reason: HOSPADM

## 2020-01-30 RX ADMIN — MEXILETINE HYDROCHLORIDE 150 MG: 150 CAPSULE ORAL at 09:54

## 2020-01-30 RX ADMIN — MEXILETINE HYDROCHLORIDE 150 MG: 150 CAPSULE ORAL at 21:52

## 2020-01-30 RX ADMIN — OXYCODONE HYDROCHLORIDE 10 MG: 10 TABLET ORAL at 09:56

## 2020-01-30 RX ADMIN — MONTELUKAST SODIUM 10 MG: 10 TABLET, FILM COATED ORAL at 21:52

## 2020-01-30 RX ADMIN — MONTELUKAST SODIUM 10 MG: 10 TABLET, FILM COATED ORAL at 00:06

## 2020-01-30 RX ADMIN — POTASSIUM CHLORIDE 20 MEQ: 1500 TABLET, EXTENDED RELEASE ORAL at 09:54

## 2020-01-30 RX ADMIN — LEVOTHYROXINE SODIUM 88 MCG: 88 TABLET ORAL at 05:37

## 2020-01-30 RX ADMIN — CARVEDILOL 6.25 MG: 6.25 TABLET, FILM COATED ORAL at 07:46

## 2020-01-30 RX ADMIN — OXYCODONE HYDROCHLORIDE 5 MG: 5 TABLET ORAL at 21:55

## 2020-01-30 RX ADMIN — MEXILETINE HYDROCHLORIDE 150 MG: 150 CAPSULE ORAL at 00:05

## 2020-01-30 RX ADMIN — HEPARIN SODIUM 5000 UNITS: 5000 INJECTION INTRAVENOUS; SUBCUTANEOUS at 21:52

## 2020-01-30 RX ADMIN — PANTOPRAZOLE SODIUM 40 MG: 40 TABLET, DELAYED RELEASE ORAL at 05:37

## 2020-01-30 RX ADMIN — DIGOXIN 125 MCG: 125 TABLET ORAL at 09:54

## 2020-01-30 RX ADMIN — MEXILETINE HYDROCHLORIDE 150 MG: 150 CAPSULE ORAL at 16:33

## 2020-01-30 RX ADMIN — OXYCODONE HYDROCHLORIDE 10 MG: 10 TABLET ORAL at 14:25

## 2020-01-30 RX ADMIN — LOSARTAN POTASSIUM 25 MG: 25 TABLET, FILM COATED ORAL at 09:56

## 2020-01-30 RX ADMIN — ASPIRIN 81 MG: 81 TABLET ORAL at 09:54

## 2020-01-30 RX ADMIN — OXYCODONE HYDROCHLORIDE 10 MG: 10 TABLET ORAL at 05:37

## 2020-01-30 RX ADMIN — HEPARIN SODIUM 5000 UNITS: 5000 INJECTION INTRAVENOUS; SUBCUTANEOUS at 13:26

## 2020-01-30 RX ADMIN — OXYCODONE HYDROCHLORIDE 5 MG: 5 TABLET ORAL at 00:06

## 2020-01-30 NOTE — PLAN OF CARE
Problem: Potential for Falls  Goal: Patient will remain free of falls  Description  INTERVENTIONS:  - Assess patient frequently for physical needs  -  Identify cognitive and physical deficits and behaviors that affect risk of falls    -  [place] fall precautions as indicated by assessment   - Educate patient/family on patient safety including physical limitations  - Instruct patient to call for assistance with activity based on assessment  - Modify environment to reduce risk of injury  - Consider OT/PT consult to assist with strengthening/mobility  Outcome: Progressing     Problem: PAIN - ADULT  Goal: Verbalizes/displays adequate comfort level or baseline comfort level  Description  Interventions:  - Encourage patient to monitor pain and request assistance  - Assess pain using appropriate pain scale  - Administer analgesics based on type and severity of pain and evaluate response  - Implement non-pharmacological measures as appropriate and evaluate response  - Consider cultural and social influences on pain and pain management  - Notify physician/advanced practitioner if interventions unsuccessful or patient reports new pain  Outcome: Progressing     Problem: INFECTION - ADULT  Goal: Absence or prevention of progression during hospitalization  Description  INTERVENTIONS:  - Assess and monitor for signs and symptoms of infection  - Monitor lab/diagnostic results  - Monitor all insertion sites, i e  indwelling lines, tubes, and drains  - Monitor endotracheal if appropriate and nasal secretions for changes in amount and color  - [place] appropriate cooling/warming therapies per order  - Administer medications as ordered  - Instruct and encourage patient and family to use good hand hygiene technique  - Identify and instruct in appropriate isolation precautions for identified infection/condition  Outcome: Progressing     Problem: SAFETY ADULT  Goal: Patient will remain free of falls  Description  INTERVENTIONS:  - Assess patient frequently for physical needs  -  Identify cognitive and physical deficits and behaviors that affect risk of falls    -  [place] fall precautions as indicated by assessment   - Educate patient/family on patient safety including physical limitations  - Instruct patient to call for assistance with activity based on assessment  - Modify environment to reduce risk of injury  - Consider OT/PT consult to assist with strengthening/mobility  Outcome: Progressing     Problem: RESPIRATORY - ADULT  Goal: Achieves optimal ventilation and oxygenation  Description  INTERVENTIONS:  - Assess for changes in respiratory status  - Assess for changes in mentation and behavior  - Position to facilitate oxygenation and minimize respiratory effort  - Oxygen administered by appropriate delivery if ordered  - Initiate smoking cessation education as indicated  - Encourage broncho-pulmonary hygiene including cough, deep breathe, Incentive Spirometry  - Assess the need for suctioning and aspirate as needed  - Assess and instruct to report SOB or any respiratory difficulty  - Respiratory Therapy support as indicated  Outcome: Progressing     Problem: SKIN/TISSUE INTEGRITY - ADULT  Goal: Skin integrity remains intact  Description  INTERVENTIONS  - Identify patients at risk for skin breakdown  - Assess and monitor skin integrity  - Assess and monitor nutrition and hydration status  - Monitor labs (i e  albumin)  - Assess for incontinence   - Turn and reposition patient  - Assist with mobility/ambulation  - Relieve pressure over bony prominences  - Avoid friction and shearing  - Provide appropriate hygiene as needed including keeping skin clean and dry  - Evaluate need for skin moisturizer/barrier cream  - Collaborate with interdisciplinary team (i e  Nutrition, Rehabilitation, etc )   - Patient/family teaching  Outcome: Progressing     Problem: HEMATOLOGIC - ADULT  Goal: Maintains hematologic stability  Description  INTERVENTIONS  - Assess for signs and symptoms of bleeding or hemorrhage  - Monitor labs  - Administer supportive blood products/factors as ordered and appropriate  Outcome: Progressing

## 2020-01-30 NOTE — ASSESSMENT & PLAN NOTE
Chronic as per her cardiologist  Progressive over the past few months  Likely secondary to liver metastases  Avoid hepatotoxic drugs  Check liver ultrasound

## 2020-01-30 NOTE — ASSESSMENT & PLAN NOTE
Wt Readings from Last 3 Encounters:   01/30/20 72 kg (158 lb 11 7 oz)     Appears euvolemic  Continue Demadex, Coreg and Ivabradine  ICD in place  Follow on cardiac echo  Cardiology is following

## 2020-01-30 NOTE — ASSESSMENT & PLAN NOTE
· Patient states that prior shortness of breath has been associated with V-tach in the past, as per EMS she had 5 beat run of V-tach  · Chest x-ray shows no acute cardiopulmonary pathology  · Evaluated by Cardiology rule out cardiac arrhythmia, follow on cardiac echo  · ICD interrogation  · Continue telemetry

## 2020-01-30 NOTE — SOCIAL WORK
Met with the patient to complete assessment and explain role of CM  She lives with a friend, Shamika Franz, [phone], who is her primary contact  Patient has an Advance Directive and was asked to provide a copy  She is independent and has no DME or HHC  Her PCP is Dr Love Ponce  The patient has prescription coverage and uses [facility] [place]  CM reviewed d/c planning process including the following: identifying help at home, patient preference for d/c planning needs, Discharge Lounge, [facility] Meds to Bed program, availability of treatment team to discuss questions or concerns patient and/or family may have regarding understanding medications and recognizing signs and symptoms once discharged  CM also encouraged patient to follow up with all recommended appointments after discharge  Patient advised of importance for patient and family to participate in managing patients medical well being  Patient/caregiver received discharge checklist  Content reviewed  Patient/caregiver encouraged to participate in discharge plan of care prior to discharge home

## 2020-01-30 NOTE — ASSESSMENT & PLAN NOTE
Likely secondary to breast cancer metastases to bone and liver  Monitor  Continue oral fluid restriction

## 2020-01-30 NOTE — PROGRESS NOTES
Team 2 Cardiology - Progress Note  Garcia Page 49 y o  female MRN: [id number]  Unit/Bed#: [facility] 505-01 Encounter: [id number]    Assessment:  Principal Problem:    SOB (shortness of breath)  Active Problems:    Ventricular tachycardia (HCC)    Chronic atrial fibrillation    Metastatic breast cancer (HCC)    Supratherapeutic INR    Elevated LFTs    Chronic systolic heart failure ([address] )    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[address] )    Type 2 diabetes mellitus without complication, without long-term current use of insulin ([address] )    # Dyspnea  -etiology unclear at this time; this does not appear to be cardiac in origin  -no overt signs or symptoms of HF; CXR clear  -less likely PE as patient has no tachycardia, hypoxemia  # 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  -TTE 11/1/18 at [facility] per report: LVEF 15%, LVEDd 6 3cm, grade 2 DD, RV mild dysfunction,   -home regimen: ASA, Coreg 6 25mg, Ivabradine 5mg, spironolactone 50mg; she is off a loop diuretic as of 1-2 months ago  # VT, LBBB s/p BiV ICD, on mixiletine  # Hx of RA thrombus on Coumadin  -denies hx of AF/AFL, no documentation of such in EMR  # Metastatic breast CA to bone, liver  # Transaminitis, elevated ALP  -progressive over the last few months  # Supratherapeutic INR 2/2 above, recent nose bleeds  -s/p 5mg of VitK; now INR 1 5       Plan:  1  Await completion of 2D echo  Will obtain interrogation of BiV to rule out arrhythmogenic etiology (though this is less likely)  2  Recommend evaluation by GI for worsening transaminitis, synthetic liver function  3  Monitor on telemetry  4  Continue OP cardiac therapy with the exception of statin  Subjective/Objective     Subjective: No events overnight  Dyspnea has resolved  Denies dizziness, palpitations, CP, orthopnea, PND        Objective:  Vitals: /53   Pulse 97   Temp 98 °F (36 7 °C) (Oral)   Resp 16   Ht 5' 8" (1 727 m)   Wt 72 kg (158 lb 11 7 oz)   SpO2 97%   BMI 24 13 kg/m²   Vitals:    01/29/20 2009 01/30/20 0540   Weight: 72 kg (158 lb 11 7 oz) 72 kg (158 lb 11 7 oz)     Orthostatic Blood Pressures      Most Recent Value   Blood Pressure  110/53 filed at 01/30/2020 9532   Patient Position - Orthostatic VS  Lying filed at 01/30/2020 0747            Intake/Output Summary (Last 24 hours) at 1/30/2020 1021  Last data filed at 1/30/2020 0801  Gross per 24 hour   Intake 370 ml   Output 1150 ml   Net -780 ml       Physical Exam:   General appearance: alert and in no acute distress  Head: Normocephalic, without obvious abnormality, atraumatic  Neck: no JVD and supple, symmetrical, trachea midline  Lungs: clear to auscultation bilaterally, Normal air entry, Normal effort, No rales, wheezing  Heart: S1, S2 normal and no S3 or S4, No murmur, No gallop, No rub  Abdomen: soft, non-tender; no masses,  no organomegaly  Extremities: extremities normal, atraumatic, no cyanosis, no edema  Pulses: 2+ and symmetric bilaterally  Skin: Skin color, texture, turgor normal; No rashes or lesions  Neurologic: Grossly normal, Alert and oriented      Medications:    Current Facility-Administered Medications:     aspirin (ECOTRIN LOW STRENGTH) EC tablet 81 mg, 81 mg, Oral, Daily, Kailee Rodriguez MD, 81 mg at 01/30/20 0954    carvedilol (COREG) tablet 6 25 mg, 6 25 mg, Oral, BID With Meals, Kailee Rodriguez MD, 6 25 mg at 01/30/20 0746    digoxin (LANOXIN) tablet 125 mcg, 125 mcg, Oral, Daily, Kailee Rodriguez MD, 125 mcg at 01/30/20 0954    insulin lispro (HumaLOG) 100 units/mL subcutaneous injection 1-5 Units, 1-5 Units, Subcutaneous, 4x Daily (AC & HS), 1 Units at 01/29/20 2315 **AND** Fingerstick Glucose (POCT), , , 4x Daily AC and at bedtime, Kailee Rodriguez MD    levothyroxine tablet 88 mcg, 88 mcg, Oral, Early Morning, Kailee Rodriguez MD, 88 mcg at 01/30/20 0537    losartan (COZAAR) tablet 25 mg, 25 mg, Oral, Daily, Kailee Rodriguez MD, 25 mg at 01/30/20 0956    mexiletine (MEXITIL) capsule 150 mg, 150 mg, Oral, TID, Kailee Rodriguez MD, 150 mg at 01/30/20 0954    montelukast (SINGULAIR) tablet 10 mg, 10 mg, Oral, HS, Nella Barragan MD, 10 mg at 01/30/20 0006    NON FORMULARY, 5 mg, Oral, BID, Monique Joseph PA-C, Stopped at 01/30/20 0954    oxyCODONE (ROXICODONE) immediate release tablet 10 mg, 10 mg, Oral, Q6H PRN, Nella Barragan MD, 10 mg at 01/30/20 0956    oxyCODONE (ROXICODONE) IR tablet 5 mg, 5 mg, Oral, Q4H PRN, Nella Barragan MD, 5 mg at 01/30/20 0006    pantoprazole (PROTONIX) EC tablet 40 mg, 40 mg, Oral, Early Morning, Nella Barragan MD, 40 mg at 01/30/20 0537    polyethylene glycol (MIRALAX) packet 17 g, 17 g, Oral, Daily, Nella Barragan MD, Stopped at 01/30/20 0954    potassium chloride (K-DUR,KLOR-CON) CR tablet 20 mEq, 20 mEq, Oral, Daily, Nella Barragan MD, 20 mEq at 01/30/20 0954    torsemide (DEMADEX) tablet 20 mg, 20 mg, Oral, Daily PRN, Nella Barragan MD    Lab Results:  Results from last 7 days   Lab Units 01/29/20  1150   TROPONIN I ng/mL <0 02     Results from last 7 days   Lab Units 01/29/20  1150   WBC Thousand/uL 22 09*   HEMOGLOBIN g/dL 10 2*   HEMATOCRIT % 30 8*   PLATELETS Thousands/uL 328         Results from last 7 days   Lab Units 01/30/20 0444 01/29/20  1150   POTASSIUM mmol/L 4 3 5 1   CHLORIDE mmol/L 95* 99*   CO2 mmol/L 26 26   BUN mg/dL 26* 25   CREATININE mg/dL 0 89 1 11   CALCIUM mg/dL 9 3 9 9   ALK PHOS U/L 995* 1,165*   ALT U/L 174* 205*   AST U/L 469* 519*     Results from last 7 days   Lab Units 01/30/20  0444 01/29/20  2125 01/29/20  1154   INR  1 54* 2 16* >15 00*   PTT seconds  --   --  89*           Telemetry: Personally reviewed  Imaging: Personally reviewed  EKG: Personally reviewed       Lizy Velasquez MD  Cardiology Fellow

## 2020-01-30 NOTE — ASSESSMENT & PLAN NOTE
With metastasis to bone liver  Patient has an appointment with her oncologist on Monday at [facility]

## 2020-01-30 NOTE — PLAN OF CARE
Problem: Potential for Falls  Goal: Patient will remain free of falls  Description  INTERVENTIONS:  - Assess patient frequently for physical needs  -  Identify cognitive and physical deficits and behaviors that affect risk of falls    -  [place] fall precautions as indicated by assessment   - Educate patient/family on patient safety including physical limitations  - Instruct patient to call for assistance with activity based on assessment  - Modify environment to reduce risk of injury  - Consider OT/PT consult to assist with strengthening/mobility  Outcome: Progressing     Problem: PAIN - ADULT  Goal: Verbalizes/displays adequate comfort level or baseline comfort level  Description  Interventions:  - Encourage patient to monitor pain and request assistance  - Assess pain using appropriate pain scale  - Administer analgesics based on type and severity of pain and evaluate response  - Implement non-pharmacological measures as appropriate and evaluate response  - Consider cultural and social influences on pain and pain management  - Notify physician/advanced practitioner if interventions unsuccessful or patient reports new pain  Outcome: Progressing     Problem: INFECTION - ADULT  Goal: Absence or prevention of progression during hospitalization  Description  INTERVENTIONS:  - Assess and monitor for signs and symptoms of infection  - Monitor lab/diagnostic results  - Monitor all insertion sites, i e  indwelling lines, tubes, and drains  - Monitor endotracheal if appropriate and nasal secretions for changes in amount and color  - [place] appropriate cooling/warming therapies per order  - Administer medications as ordered  - Instruct and encourage patient and family to use good hand hygiene technique  - Identify and instruct in appropriate isolation precautions for identified infection/condition  Outcome: Progressing     Problem: SAFETY ADULT  Goal: Patient will remain free of falls  Description  INTERVENTIONS:  - Assess patient frequently for physical needs  -  Identify cognitive and physical deficits and behaviors that affect risk of falls    -  [place] fall precautions as indicated by assessment   - Educate patient/family on patient safety including physical limitations  - Instruct patient to call for assistance with activity based on assessment  - Modify environment to reduce risk of injury  - Consider OT/PT consult to assist with strengthening/mobility  Outcome: Progressing     Problem: RESPIRATORY - ADULT  Goal: Achieves optimal ventilation and oxygenation  Description  INTERVENTIONS:  - Assess for changes in respiratory status  - Assess for changes in mentation and behavior  - Position to facilitate oxygenation and minimize respiratory effort  - Oxygen administered by appropriate delivery if ordered  - Initiate smoking cessation education as indicated  - Encourage broncho-pulmonary hygiene including cough, deep breathe, Incentive Spirometry  - Assess the need for suctioning and aspirate as needed  - Assess and instruct to report SOB or any respiratory difficulty  - Respiratory Therapy support as indicated  Outcome: Progressing     Problem: SKIN/TISSUE INTEGRITY - ADULT  Goal: Skin integrity remains intact  Description  INTERVENTIONS  - Identify patients at risk for skin breakdown  - Assess and monitor skin integrity  - Assess and monitor nutrition and hydration status  - Monitor labs (i e  albumin)  - Assess for incontinence   - Turn and reposition patient  - Assist with mobility/ambulation  - Relieve pressure over bony prominences  - Avoid friction and shearing  - Provide appropriate hygiene as needed including keeping skin clean and dry  - Evaluate need for skin moisturizer/barrier cream  - Collaborate with interdisciplinary team (i e  Nutrition, Rehabilitation, etc )   - Patient/family teaching  Outcome: Progressing     Problem: HEMATOLOGIC - ADULT  Goal: Maintains hematologic stability  Description  INTERVENTIONS  - Assess for signs and symptoms of bleeding or hemorrhage  - Monitor labs  - Administer supportive blood products/factors as ordered and appropriate  Outcome: Progressing

## 2020-01-30 NOTE — PROGRESS NOTES
Progress Note - Clayton Lemus 1965, 49 y o  female MRN: [id number]    Unit/Bed#: [facility] 505-01 Encounter: [id number]    Primary Care Provider: No primary care provider on file     Date and time admitted to hospital: 1/29/2020 11:33 AM        * SOB (shortness of breath)  Assessment & Plan  · Patient states that prior shortness of breath has been associated with V-tach in the past, as per EMS she had 5 beat run of V-tach  · Chest x-ray shows no acute cardiopulmonary pathology  · Evaluated by Cardiology rule out cardiac arrhythmia, follow on cardiac echo  · ICD interrogation  · Continue telemetry      Hyponatremia  Assessment & Plan  Likely secondary to breast cancer metastases to bone and liver  Monitor  Continue oral fluid restriction    Cardiomyopathy secondary to drug [facility])  Assessment & Plan  Nonischemic cardiomyopathy, EF 25%  Secondary to chemotherapy     Chronic systolic heart failure ([address] )  Assessment & Plan  Wt Readings from Last 3 Encounters:   01/30/20 72 kg (158 lb 11 7 oz)     Appears euvolemic  Continue Demadex, Coreg and Ivabradine  ICD in place  Follow on cardiac echo  Cardiology is following    Elevated LFTs  Assessment & Plan  Chronic as per her cardiologist  Progressive over the past few months  Likely secondary to liver metastases  Avoid hepatotoxic drugs  Check liver ultrasound    Supratherapeutic INR  Assessment & Plan  History of RA thrombus on Coumadin   Given 5 mg IV vitamin K in ER  INR today 1 5  Monitor    Metastatic breast cancer [facility])  Assessment & Plan  With metastasis to bone liver  Patient has an appointment with her oncologist on Monday at [facility]    Chronic atrial fibrillation  Assessment & Plan  Rate controlled  Continue home meds  Hold anticoagulation as INR is supratherapeutic    Ventricular tachycardia ([address] )  Assessment & Plan  Await ICD interrogation  On mexiletine  As above         VTE Pharmacologic Prophylaxis:   Pharmacologic: Heparin  Mechanical VTE Prophylaxis in Place: Yes    Patient Centered Rounds: I have performed bedside rounds with nursing staff today  Discussions with Specialists or Other Care Team Provider:     Education and Discussions with Family / Patient:  Patient    Time Spent for Care: 30 minutes  More than 50% of total time spent on counseling and coordination of care as described above  Current Length of Stay: 1 day(s)    Current Patient Status: Inpatient   Certification Statement: The patient will continue to require additional inpatient hospital stay due to Above    Discharge Plan / Estimated Discharge Date: TBD    Code Status: Level 1 - Full Code      Subjective:   Patient seen and examined  Comfortable in bed  No chest pain or shortness of breath  No nausea vomiting no abdominal pain    Objective:     Vitals:   Temp (24hrs), Av 1 °F (36 7 °C), Min:97 7 °F (36 5 °C), Max:98 7 °F (37 1 °C)    Temp:  [97 7 °F (36 5 °C)-98 7 °F (37 1 °C)] 98 °F (36 7 °C)  HR:  [72-97] 97  Resp:  [13-22] 16  BP: (104-132)/(49-56) 110/53  SpO2:  [94 %-98 %] 97 %  Body mass index is 24 13 kg/m²  Input and Output Summary (last 24 hours):        Intake/Output Summary (Last 24 hours) at 2020 1129  Last data filed at 2020 0801  Gross per 24 hour   Intake 370 ml   Output 1150 ml   Net -780 ml       Physical Exam:     Physical Exam  Patient is awake alert in no acute distress  Skin is jaundice  Lung clear to auscultation bilateral  Heart positive S1-S2 no murmur  Abdomen soft nontender  Lower extremities no edema    Additional Data:     Labs:    Results from last 7 days   Lab Units 20  1150   WBC Thousand/uL 22 09*   HEMOGLOBIN g/dL 10 2*   HEMATOCRIT % 30 8*   PLATELETS Thousands/uL 328   LYMPHO PCT % 12*   MONO PCT % 10   EOS PCT % 3     Results from last 7 days   Lab Units 20  0444   POTASSIUM mmol/L 4 3   CHLORIDE mmol/L 95*   CO2 mmol/L 26   BUN mg/dL 26*   CREATININE mg/dL 0 89   CALCIUM mg/dL 9 3   ALK PHOS U/L 995*   ALT U/L 174*   AST U/L 469*     Results from last 7 days   Lab Units 01/30/20  0444   INR  1 54*       * I Have Reviewed All Lab Data Listed Above  * Additional Pertinent Lab Tests Reviewed: [address] Admission Reviewed    Imaging:    Imaging Reports Reviewed Today Include:   Imaging Personally Reviewed by Myself Includes:      Recent Cultures (last 7 days):           Last 24 Hours Medication List:     Current Facility-Administered Medications:  aspirin 81 mg Oral Daily Gurmeet Taylor MD   carvedilol 6 25 mg Oral BID With Meals Gurmeet Taylor MD   digoxin 125 mcg Oral Daily Gurmeet Taylor MD   insulin lispro 1-5 Units Subcutaneous 4x Daily (AC & HS) Gurmeet Taylor MD   ivabradine HCl 5 mg Oral BID With Meals Monique Joseph PA-C   levothyroxine 88 mcg Oral Early Morning Gurmeet Taylor MD   losartan 25 mg Oral Daily Gurmeet Taylor MD   mexiletine 150 mg Oral TID Gurmeet Taylor MD   montelukast 10 mg Oral HS Gurmeet Taylor MD   oxyCODONE 10 mg Oral Q6H PRN Gurmeet Taylor MD   oxyCODONE 5 mg Oral Q4H PRN Gurmeet Taylor MD   pantoprazole 40 mg Oral Early Morning Gurmeet Taylor MD   polyethylene glycol 17 g Oral Daily Gurmeet Taylor MD   potassium chloride 20 mEq Oral Daily Gurmeet Taylor MD   torsemide 20 mg Oral Daily PRN Gurmeet Taylor MD        Today, Patient Was Seen By: Jacque Salinas DO    ** Please Note: This note has been constructed using a voice recognition system   **

## 2020-01-31 ENCOUNTER — APPOINTMENT (INPATIENT)
Dept: RADIOLOGY | Facility: HOSPITAL | Age: 55
DRG: 308 | End: 2020-01-31
Payer: MEDICARE

## 2020-01-31 PROBLEM — Z51.5 PALLIATIVE CARE PATIENT: Status: ACTIVE | Noted: 2020-01-31

## 2020-01-31 LAB
ALBUMIN SERPL BCP-MCNC: 1.9 G/DL (ref 3.5–5)
ALP SERPL-CCNC: 1248 U/L (ref 46–116)
ALT SERPL W P-5'-P-CCNC: 417 U/L (ref 12–78)
ANION GAP SERPL CALCULATED.3IONS-SCNC: 8 MMOL/L (ref 4–13)
ANISOCYTOSIS BLD QL SMEAR: PRESENT
AST SERPL W P-5'-P-CCNC: 1255 U/L (ref 5–45)
BACTERIA UR QL AUTO: NORMAL /HPF
BASOPHILS # BLD MANUAL: 0 THOUSAND/UL (ref 0–0.1)
BASOPHILS NFR MAR MANUAL: 0 % (ref 0–1)
BILIRUB SERPL-MCNC: 4.55 MG/DL (ref 0.2–1)
BILIRUB UR QL STRIP: NEGATIVE
BUN SERPL-MCNC: 44 MG/DL (ref 5–25)
CALCIUM SERPL-MCNC: 8.9 MG/DL (ref 8.3–10.1)
CHLORIDE SERPL-SCNC: 93 MMOL/L (ref 100–108)
CLARITY UR: CLEAR
CO2 SERPL-SCNC: 25 MMOL/L (ref 21–32)
COLOR UR: YELLOW
CREAT SERPL-MCNC: 1.84 MG/DL (ref 0.6–1.3)
EOSINOPHIL # BLD MANUAL: 0.13 THOUSAND/UL (ref 0–0.4)
EOSINOPHIL NFR BLD MANUAL: 1 % (ref 0–6)
ERYTHROCYTE [DISTWIDTH] IN BLOOD BY AUTOMATED COUNT: 15.9 % (ref 11.6–15.1)
GFR SERPL CREATININE-BSD FRML MDRD: 31 ML/MIN/1.73SQ M
GLUCOSE SERPL-MCNC: 149 MG/DL (ref 65–140)
GLUCOSE SERPL-MCNC: 153 MG/DL (ref 65–140)
GLUCOSE SERPL-MCNC: 160 MG/DL (ref 65–140)
GLUCOSE SERPL-MCNC: 177 MG/DL (ref 65–140)
GLUCOSE SERPL-MCNC: 180 MG/DL (ref 65–140)
GLUCOSE UR STRIP-MCNC: NEGATIVE MG/DL
HCT VFR BLD AUTO: 25.2 % (ref 34.8–46.1)
HGB BLD-MCNC: 8 G/DL (ref 11.5–15.4)
HGB UR QL STRIP.AUTO: NEGATIVE
HYALINE CASTS #/AREA URNS LPF: NORMAL /LPF
INR PPP: 1.73 (ref 0.84–1.19)
KETONES UR STRIP-MCNC: NEGATIVE MG/DL
LEUKOCYTE ESTERASE UR QL STRIP: NEGATIVE
LYMPHOCYTES # BLD AUTO: 0.88 THOUSAND/UL (ref 0.6–4.47)
LYMPHOCYTES # BLD AUTO: 7 % (ref 14–44)
MAGNESIUM SERPL-MCNC: 1.9 MG/DL (ref 1.6–2.6)
MCH RBC QN AUTO: 33.9 PG (ref 26.8–34.3)
MCHC RBC AUTO-ENTMCNC: 31.7 G/DL (ref 31.4–37.4)
MCV RBC AUTO: 107 FL (ref 82–98)
METAMYELOCYTES NFR BLD MANUAL: 1 % (ref 0–1)
MONOCYTES # BLD AUTO: 0.88 THOUSAND/UL (ref 0–1.22)
MONOCYTES NFR BLD: 7 % (ref 4–12)
NEUTROPHILS # BLD MANUAL: 10.5 THOUSAND/UL (ref 1.85–7.62)
NEUTS BAND NFR BLD MANUAL: 2 % (ref 0–8)
NEUTS SEG NFR BLD AUTO: 82 % (ref 43–75)
NITRITE UR QL STRIP: NEGATIVE
NON-SQ EPI CELLS URNS QL MICRO: NORMAL /HPF
NRBC BLD AUTO-RTO: 3 /100 WBCS
NRBC BLD AUTO-RTO: 4 /100 WBC (ref 0–2)
PH UR STRIP.AUTO: 6 [PH]
PLATELET # BLD AUTO: 273 THOUSANDS/UL (ref 149–390)
PLATELET BLD QL SMEAR: ADEQUATE
PMV BLD AUTO: 10.8 FL (ref 8.9–12.7)
POLYCHROMASIA BLD QL SMEAR: PRESENT
POTASSIUM SERPL-SCNC: 4.7 MMOL/L (ref 3.5–5.3)
PROT SERPL-MCNC: 6.5 G/DL (ref 6.4–8.2)
PROT UR STRIP-MCNC: NEGATIVE MG/DL
PROTHROMBIN TIME: 19.8 SECONDS (ref 11.6–14.5)
RBC # BLD AUTO: 2.36 MILLION/UL (ref 3.81–5.12)
RBC #/AREA URNS AUTO: NORMAL /HPF
RBC MORPH BLD: PRESENT
SODIUM 24H UR-SCNC: 55 MOL/L
SODIUM SERPL-SCNC: 126 MMOL/L (ref 136–145)
SP GR UR STRIP.AUTO: 1.02 (ref 1–1.03)
TSH SERPL DL<=0.05 MIU/L-ACNC: 5.78 UIU/ML (ref 0.36–3.74)
UROBILINOGEN UR QL STRIP.AUTO: 0.2 E.U./DL
WBC # BLD AUTO: 12.5 THOUSAND/UL (ref 4.31–10.16)
WBC #/AREA URNS AUTO: NORMAL /HPF

## 2020-01-31 PROCEDURE — 80053 COMPREHEN METABOLIC PANEL: CPT | Performed by: INTERNAL MEDICINE

## 2020-01-31 PROCEDURE — 99222 1ST HOSP IP/OBS MODERATE 55: CPT | Performed by: INTERNAL MEDICINE

## 2020-01-31 PROCEDURE — 74176 CT ABD & PELVIS W/O CONTRAST: CPT

## 2020-01-31 PROCEDURE — 85007 BL SMEAR W/DIFF WBC COUNT: CPT | Performed by: INTERNAL MEDICINE

## 2020-01-31 PROCEDURE — 84443 ASSAY THYROID STIM HORMONE: CPT | Performed by: INTERNAL MEDICINE

## 2020-01-31 PROCEDURE — 99223 1ST HOSP IP/OBS HIGH 75: CPT | Performed by: INTERNAL MEDICINE

## 2020-01-31 PROCEDURE — 99233 SBSQ HOSP IP/OBS HIGH 50: CPT | Performed by: INTERNAL MEDICINE

## 2020-01-31 PROCEDURE — 84300 ASSAY OF URINE SODIUM: CPT | Performed by: INTERNAL MEDICINE

## 2020-01-31 PROCEDURE — 83735 ASSAY OF MAGNESIUM: CPT | Performed by: INTERNAL MEDICINE

## 2020-01-31 PROCEDURE — 85610 PROTHROMBIN TIME: CPT | Performed by: INTERNAL MEDICINE

## 2020-01-31 PROCEDURE — 82948 REAGENT STRIP/BLOOD GLUCOSE: CPT

## 2020-01-31 PROCEDURE — 81001 URINALYSIS AUTO W/SCOPE: CPT | Performed by: INTERNAL MEDICINE

## 2020-01-31 PROCEDURE — 85027 COMPLETE CBC AUTOMATED: CPT | Performed by: INTERNAL MEDICINE

## 2020-01-31 RX ORDER — LIDOCAINE 50 MG/G
1 PATCH TOPICAL DAILY
Status: DISCONTINUED | OUTPATIENT
Start: 2020-01-31 | End: 2020-02-11 | Stop reason: HOSPADM

## 2020-01-31 RX ORDER — SODIUM CHLORIDE 9 MG/ML
75 INJECTION, SOLUTION INTRAVENOUS CONTINUOUS
Status: DISPENSED | OUTPATIENT
Start: 2020-01-31 | End: 2020-01-31

## 2020-01-31 RX ORDER — FENTANYL CITRATE 50 UG/ML
25 INJECTION, SOLUTION INTRAMUSCULAR; INTRAVENOUS EVERY 2 HOUR PRN
Status: DISCONTINUED | OUTPATIENT
Start: 2020-01-31 | End: 2020-02-09

## 2020-01-31 RX ORDER — GABAPENTIN 100 MG/1
100 CAPSULE ORAL
Status: DISCONTINUED | OUTPATIENT
Start: 2020-01-31 | End: 2020-02-11 | Stop reason: HOSPADM

## 2020-01-31 RX ORDER — OXYCODONE HYDROCHLORIDE 5 MG/1
5 TABLET ORAL EVERY 6 HOURS
Status: DISCONTINUED | OUTPATIENT
Start: 2020-01-31 | End: 2020-02-01

## 2020-01-31 RX ADMIN — HEPARIN SODIUM 5000 UNITS: 5000 INJECTION INTRAVENOUS; SUBCUTANEOUS at 07:00

## 2020-01-31 RX ADMIN — HEPARIN SODIUM 5000 UNITS: 5000 INJECTION INTRAVENOUS; SUBCUTANEOUS at 21:59

## 2020-01-31 RX ADMIN — HEPARIN SODIUM 5000 UNITS: 5000 INJECTION INTRAVENOUS; SUBCUTANEOUS at 14:01

## 2020-01-31 RX ADMIN — DIGOXIN 125 MCG: 125 TABLET ORAL at 10:35

## 2020-01-31 RX ADMIN — ASPIRIN 81 MG: 81 TABLET ORAL at 10:35

## 2020-01-31 RX ADMIN — SODIUM CHLORIDE 75 ML/HR: 0.9 INJECTION, SOLUTION INTRAVENOUS at 10:44

## 2020-01-31 RX ADMIN — LIDOCAINE 1 PATCH: 50 PATCH TOPICAL at 08:53

## 2020-01-31 RX ADMIN — OXYCODONE HYDROCHLORIDE 5 MG: 5 TABLET ORAL at 07:50

## 2020-01-31 RX ADMIN — PANTOPRAZOLE SODIUM 40 MG: 40 TABLET, DELAYED RELEASE ORAL at 07:00

## 2020-01-31 RX ADMIN — LEVOTHYROXINE SODIUM 88 MCG: 88 TABLET ORAL at 07:00

## 2020-01-31 RX ADMIN — OXYCODONE HYDROCHLORIDE 5 MG: 5 TABLET ORAL at 14:01

## 2020-01-31 RX ADMIN — INSULIN LISPRO 1 UNITS: 100 INJECTION, SOLUTION INTRAVENOUS; SUBCUTANEOUS at 11:03

## 2020-01-31 RX ADMIN — INSULIN LISPRO 1 UNITS: 100 INJECTION, SOLUTION INTRAVENOUS; SUBCUTANEOUS at 07:51

## 2020-01-31 RX ADMIN — MEXILETINE HYDROCHLORIDE 150 MG: 150 CAPSULE ORAL at 12:25

## 2020-01-31 RX ADMIN — IVABRADINE 5 MG: 5 TABLET, FILM COATED ORAL at 07:51

## 2020-01-31 RX ADMIN — POLYETHYLENE GLYCOL 3350 17 G: 17 POWDER, FOR SOLUTION ORAL at 10:35

## 2020-01-31 RX ADMIN — OXYCODONE HYDROCHLORIDE 5 MG: 5 TABLET ORAL at 20:18

## 2020-01-31 RX ADMIN — IVABRADINE 5 MG: 5 TABLET, FILM COATED ORAL at 16:40

## 2020-01-31 RX ADMIN — OXYCODONE HYDROCHLORIDE 5 MG: 5 TABLET ORAL at 18:24

## 2020-01-31 RX ADMIN — INSULIN LISPRO 1 UNITS: 100 INJECTION, SOLUTION INTRAVENOUS; SUBCUTANEOUS at 16:39

## 2020-01-31 RX ADMIN — GABAPENTIN 100 MG: 100 CAPSULE ORAL at 22:00

## 2020-01-31 RX ADMIN — MEXILETINE HYDROCHLORIDE 150 MG: 150 CAPSULE ORAL at 22:00

## 2020-01-31 RX ADMIN — MONTELUKAST SODIUM 10 MG: 10 TABLET, FILM COATED ORAL at 22:00

## 2020-01-31 RX ADMIN — MEXILETINE HYDROCHLORIDE 150 MG: 150 CAPSULE ORAL at 16:39

## 2020-01-31 RX ADMIN — OXYCODONE HYDROCHLORIDE 10 MG: 10 TABLET ORAL at 03:29

## 2020-01-31 NOTE — CONSULTS
Consultation - [address] [place] 49 y o  female MRN: [id number]  Unit/Bed#: [facility] 505-01 Encounter: [id number]      Assessment/Plan     Assessment:  Patient Active Problem List   Diagnosis    SOB (shortness of breath)    Ventricular tachycardia (HCC)    Chronic atrial fibrillation    Metastatic breast cancer (HCC)    Supratherapeutic INR    Elevated LFTs    Chronic systolic [facility] failure [facility] )    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 )    Type 2 diabetes mellitus without complication, without long-term current use of insulin ([facility] )    Hyponatremia    Palliative care patient       Plan:  1  Start standing oxycodone 5 mg q6H ATC with strict hold parameters for low blood pressures and maintain PRN availability  2  Start PRN Fentanyl for breakthrough pain (less chance of worsening hypotension)  3  Likely would do well with Duragesic if her OMEs dictate it  4  Start Gabapentin 100 mg HS  5  Agree with Lidoderm patch to affected area  6  Consideration for muscle relaxant if above is not effective also could consider Decadron but given DM will hold  7  Bowel regimen to prevent OIC  8  Goals- met with patient with Dr Lucy Donis- explained that her CT scan will be most telling, if no ductal obstruction amenable to drain or stent is found, likely will need to discuss comfort cares/hospice as her bilirubin currently precludes further chemotherapy  Per her request I wrote this down for her so she can review with her friend who is her main support  History of Present Illness   Physician Requesting Consult: Donna Cooper DO  Reason for Consult / Principal Problem: pain  Hx and PE limited by: NA  HPI: Farhan Ovalle is a 49y o  year old female who presents with dyspnea on exertion  She has an AICD and did not feel it fire but did have a short run of ventricular tachycardia in the ambulance    Upon admission she was found to have abnormal liver functions and an ultrasound concerning for diffuse metastatic disease in her liver  She is currently being treated for metastatic breast cancer at [facility] with Dr Luiza Pollack  She was diagnosed in 2007 with ER/AL pos and Her2 neg markers  Her full oncologic history can be obtained from Dr Destiny Concepcion consult note  She had started on a new treatment early this month but it caused precipitous hyperglycemia and it was halted  She currently complains of low back pain and deep thigh pain  Ranks it as 4 to 8 out of 10 pending when she gets a pain medication  Has not used opioids in the past  Getting some relief with Lidoderm patch and ice  She is seen in conjunction with Dr Jasper Rowland who came in during our visit to discuss his concerns about progression of disease  She states that she "knew this day was coming" but also notes that she is not quite understanding of what is being told to her  She denies nausea/vomiting/constipation or any other symptoms at this time  No advanced directive in chart  Inpatient consult to Palliative Care  Consult performed by: Zane Chaudhry DO  Consult ordered by: Brenda Ngo DO          Review of Systems   Constitutional: Positive for activity change  Cardiovascular: Negative for chest pain  Gastrointestinal: Negative for abdominal distention and constipation  Musculoskeletal: Positive for back pain  Psychiatric/Behavioral: The patient is not nervous/anxious  All other systems reviewed and are negative        Historical Information   Past Medical History:   Diagnosis Date    Breast cancer metastasized to bone, unspecified laterality ([address] )     Cancer ([address] )     breast 2005, mets to bones 2015    Cardiac disease     Chemotherapy follow-up examination     CHF (congestive heart failure) (HCC)     Diabetes mellitus ([address] )     Disease of thyroid gland     Hyperlipidemia      Past Surgical History:   Procedure Laterality Date    BREAST SURGERY      CARDIAC PACEMAKER PLACEMENT      HYSTERECTOMY       Social History     Socioeconomic History    Marital status: Single     Spouse name: None    Number of children: None    Years of education: None    Highest education level: None   Occupational History    None   Social Needs    Financial resource strain: None    Food insecurity:     Worry: None     Inability: None    Transportation needs:     Medical: None     Non-medical: None   Tobacco Use    Smoking status: Never Smoker    Smokeless tobacco: Never Used   Substance and Sexual Activity    Alcohol use: Never     Frequency: Never     Comment: socially    Drug use: Not Currently    Sexual activity: None   Lifestyle    Physical activity:     Days per week: None     Minutes per session: None    Stress: None   Relationships    Social connections:     Talks on phone: None     Gets together: None     Attends Judaism service: None     Active member of club or organization: None     Attends meetings of clubs or organizations: None     Relationship status: None    Intimate partner violence:     Fear of current or ex partner: None     Emotionally abused: None     Physically abused: None     Forced sexual activity: None   Other Topics Concern    None   Social History Narrative    None     Family History   Problem Relation Age of Onset    Cancer Mother     Heart disease Father     Diabetes Brother        Meds/Allergies   all current active meds have been reviewed and current meds:   Current Facility-Administered Medications   Medication Dose Route Frequency    aspirin (ECOTRIN LOW STRENGTH) EC tablet 81 mg  81 mg Oral Daily    carvedilol (COREG) tablet 6 25 mg  6 25 mg Oral BID With Meals    digoxin (LANOXIN) tablet 125 mcg  125 mcg Oral Daily    fentanyl citrate (PF) 100 MCG/2ML 25 mcg  25 mcg Intravenous Q2H PRN    gabapentin (NEURONTIN) capsule 100 mg  100 mg Oral HS    heparin (porcine) subcutaneous injection 5,000 Units  5,000 Units Subcutaneous Q8H [address]    insulin lispro (HumaLOG) 100 units/mL subcutaneous injection 1-5 Units 1-5 Units Subcutaneous 4x Daily (AC & HS)    ivabradine HCl (CORLANOR) tablet 5 mg  5 mg Oral BID With Meals    levothyroxine tablet 88 mcg  88 mcg Oral Early Morning    lidocaine (LIDODERM) 5 % patch 1 patch  1 patch Topical Daily    mexiletine (MEXITIL) capsule 150 mg  150 mg Oral TID    montelukast (SINGULAIR) tablet 10 mg  10 mg Oral HS    oxyCODONE (ROXICODONE) IR tablet 5 mg  5 mg Oral Q4H PRN    oxyCODONE (ROXICODONE) IR tablet 5 mg  5 mg Oral Q6H    pantoprazole (PROTONIX) EC tablet 40 mg  40 mg Oral Early Morning    polyethylene glycol (MIRALAX) packet 17 g  17 g Oral Daily    sodium chloride 0 9 % infusion  75 mL/hr Intravenous Continuous       Palliative Care Medications: NA    Allergies   Allergen Reactions    Ambien [Zolpidem]     Entresto [Sacubitril-Valsartan] Other (See Comments)     Hypotension     Other      Other reaction(s): Unknown  Adhesive Tape    Bee Pollen Rash     Pollen       Objective     Physical Exam   Constitutional: She is oriented to person, place, and time  She appears well-nourished  No distress  Chronically ill appearing   HENT:   Head: Normocephalic and atraumatic  Right Ear: External ear normal    Left Ear: External ear normal    Nose: Nose normal    Mouth/Throat: Oropharynx is clear and moist    Eyes: EOM are normal  Right eye exhibits no discharge  Left eye exhibits no discharge  No scleral icterus  Neck: Neck supple  No JVD present  No tracheal deviation present  Cardiovascular: Normal rate, regular rhythm and intact distal pulses  Pulmonary/Chest: Effort normal and breath sounds normal  No respiratory distress  She has no wheezes  She has no rales  Abdominal: Bowel sounds are normal  She exhibits no distension and no mass  There is no tenderness  Musculoskeletal: She exhibits tenderness  She exhibits no edema or deformity  Neurological: She is alert and oriented to person, place, and time  No cranial nerve deficit   Coordination normal    Skin: Skin is warm and dry  She is not diaphoretic  Psychiatric: She has a normal mood and affect  Her behavior is normal  Judgment and thought content normal    Nursing note and vitals reviewed  Lab Results:   I have personally reviewed pertinent labs  , CBC:   Lab Results   Component Value Date    WBC 12 50 (H) 01/31/2020    HGB 8 0 (L) 01/31/2020    HCT 25 2 (L) 01/31/2020     (H) 01/31/2020     01/31/2020    MCH 33 9 01/31/2020    MCHC 31 7 01/31/2020    RDW 15 9 (H) 01/31/2020    MPV 10 8 01/31/2020    NRBC 3 01/31/2020    NRBC 4 (H) 01/31/2020   , CMP:   Lab Results   Component Value Date    SODIUM 126 (L) 01/31/2020    K 4 7 01/31/2020    CL 93 (L) 01/31/2020    CO2 25 01/31/2020    BUN 44 (H) 01/31/2020    CREATININE 1 84 (H) 01/31/2020    CALCIUM 8 9 01/31/2020    AST 1,255 (H) 01/31/2020     (H) 01/31/2020    ALKPHOS 1,248 (H) 01/31/2020    EGFR 31 01/31/2020     Imaging Studies: I have personally reviewed pertinent reports  EKG, Pathology, and Other Studies: I have personally reviewed pertinent reports  Code Status: Level 1 - Full Code  Advance Directive and Living Will:      Power of :    POLST:      Counseling / Coordination of Care  Total floor / unit time spent today 75+ minutes  Greater than 50% of total time was spent with the patient and / or family counseling and / or coordination of care  A description of the counseling / coordination of care: chart review, medication review and adjustments, supportive listening, discussion with nephrology and oncology

## 2020-01-31 NOTE — PROGRESS NOTES
Progress Note - Ada Cordero 1965, 49 y o  female MRN: [id number]    Unit/Bed#: [facility] 505-01 Encounter: [id number]    Primary Care Provider: Jodi Wright MD   Date and time admitted to hospital: 1/29/2020 11:33 AM        * SOB (shortness of breath)  Assessment & Plan  Patient states that prior shortness of breath has been associated with V-tach in the past, as per EMS she had 5 beat run of V-tach  Chest x-ray shows no acute cardiopulmonary pathology  Cardiac echo with EF 36%  Evaluated by Cardiology, no arrhythmia on ICD interrogation, no sign of CHF  Continue supportive care      Hyponatremia  Assessment & Plan  With hypotension and SACHI   Evaluated by Nephrology  Abdominal CT scan reviewed  Suspect multifactorial with poor oral intake, rule out hepatorenal syndrome and decreased renal perfusion given her hypotension  Gentle IV fluid for hydration  Check cortisol level and blood cultures    Cardiomyopathy secondary to drug [facility])  Assessment & Plan  Nonischemic cardiomyopathy, previous EF 25%,  now 36%  Secondary to chemotherapy     Chronic systolic heart failure ([facility] )  Assessment & Plan  Wt Readings from Last 3 Encounters:   01/30/20 72 kg (158 lb 11 7 oz)     EF 36%  Hypotension today  ICD in place  Continue to monitor off diuretics    Elevated LFTs  Assessment & Plan  Likely related to worsening metastasis liver disease  Liver ultrasound with widespread metastatic disease  Abdominal CT scan results reviewed  GI consulted for further management        Supratherapeutic INR  Assessment & Plan  History of RA thrombus on Coumadin   iven 5 mg IV vitamin K in ER  Monitor off Coumadin    Metastatic breast cancer ([facility] )  Assessment & Plan  With metastasis to bone and liver, ongoing low back pain  Evaluated by Oncology, recommendation for follow-up with her oncologist  Poor prognosis  Patient has an appointment with her oncologist on Monday at [facility] spoke with her oncologist nurse practitioner and I update her with patient's clinical and lab findings, she will call the patient  Palliative care consulted    Chronic atrial fibrillation  Assessment & Plan  Rate controlled  Continue home meds  Coumadin still on hold  Monitor    Ventricular tachycardia (HCC)  Assessment & Plan  No arrhythmia on ICD interrogation  On mexiletine           VTE Pharmacologic Prophylaxis:   Pharmacologic: Heparin  Mechanical VTE Prophylaxis in Place: Yes    Patient Centered Rounds: I have performed bedside rounds with nursing staff today  Discussions with Specialists or Other Care Team Provider:  Nephrology, Oncology and palliative care  Discussed with patient's oncologist nurse practitioner in details, she will call the patient    Education and Discussions with Family / Patient:     Time Spent for Care: 45 minutes  More than 50% of total time spent on counseling and coordination of care as described above  Current Length of Stay: 2 day(s)    Current Patient Status: Inpatient   Certification Statement: The patient will continue to require additional inpatient hospital stay due to Above    Discharge Plan / Estimated Discharge Date: TBD    Code Status: Level 1 - Full Code      Subjective:   Patient seen and examined earlier today  Complaining of low back pain  No chest pain  Hypotensive with hyponatremia and Ty  No nausea vomiting or diarrhea    Objective:     Vitals:   Temp (24hrs), Av 2 °F (36 8 °C), Min:97 8 °F (36 6 °C), Max:98 5 °F (36 9 °C)    Temp:  [97 8 °F (36 6 °C)-98 5 °F (36 9 °C)] 98 2 °F (36 8 °C)  HR:  [84-95] 91  Resp:  [16-18] 18  BP: (84-94)/(44-56) 88/50  SpO2:  [95 %-97 %] 95 %  Body mass index is 24 13 kg/m²  Input and Output Summary (last 24 hours):        Intake/Output Summary (Last 24 hours) at 2020 1504  Last data filed at 2020 1422  Gross per 24 hour   Intake 992 5 ml   Output 550 ml   Net 442 5 ml       Physical Exam:     Physical Exam  Patient is awake alert in no acute distress  Lung clear to auscultation bilateral  Heart positive S1-S2 no murmur  Abdomen soft nontender mildly distended  Lower extremities no edema    Additional Data:     Labs:    Results from last 7 days   Lab Units 01/31/20  0524   WBC Thousand/uL 12 50*   HEMOGLOBIN g/dL 8 0*   HEMATOCRIT % 25 2*   PLATELETS Thousands/uL 273   LYMPHO PCT % 7*   MONO PCT % 7   EOS PCT % 1     Results from last 7 days   Lab Units 01/31/20  0524   POTASSIUM mmol/L 4 7   CHLORIDE mmol/L 93*   CO2 mmol/L 25   BUN mg/dL 44*   CREATININE mg/dL 1 84*   CALCIUM mg/dL 8 9   ALK PHOS U/L 1,248*   ALT U/L 417*   AST U/L 1,255*     Results from last 7 days   Lab Units 01/31/20  0524   INR  1 73*       * I Have Reviewed All Lab Data Listed Above  * Additional Pertinent Lab Tests Reviewed:  Sybil 66 Admission Reviewed    Imaging:    Imaging Reports Reviewed Today Include:   Imaging Personally Reviewed by Myself Includes:      Recent Cultures (last 7 days):           Last 24 Hours Medication List:     Current Facility-Administered Medications:  aspirin 81 mg Oral Daily Lalo Reyes MD    carvedilol 6 25 mg Oral BID With Meals Lalo Reyes MD    digoxin 125 mcg Oral Daily Lalo Reyes MD    fentanyl citrate (PF) 25 mcg Intravenous Q2H PRN Kika M Bendas, DO    gabapentin 100 mg Oral HS Kika M Bendas, DO    heparin (porcine) 5,000 Units Subcutaneous Q8H [facility] Nicolekian Angulo DO    insulin lispro 1-5 Units Subcutaneous 4x Daily (AC & HS) Lalo Reyes MD    ivabradine HCl 5 mg Oral BID With Meals Monique Joseph PA-C    levothyroxine 88 mcg Oral Early Morning Lalo Reyes MD    lidocaine 1 patch Topical Daily Monique Joseph PA-C    mexiletine 150 mg Oral TID Lalo Reyes MD    montelukast 10 mg Oral HS Lalo Reyes MD    oxyCODONE 5 mg Oral Q4H PRN Lalo Reyes MD    oxyCODONE 5 mg Oral Q6H Kika ISAC Boles, DO    pantoprazole 40 mg Oral Early Morning Lalo Reyes MD    polyethylene glycol 17 g Oral Daily Lalo Reyes MD sodium chloride 75 mL/hr Intravenous Continuous Emily Burnette MD Last Rate: 75 mL/hr (01/31/20 1044)        Today, Patient Was Seen By: Sloane Chino DO    ** Please Note: This note has been constructed using a voice recognition system   **

## 2020-01-31 NOTE — ASSESSMENT & PLAN NOTE
Likely related to worsening metastasis liver disease  Liver ultrasound with widespread metastatic disease  Abdominal CT scan results reviewed  GI consulted for further management

## 2020-01-31 NOTE — PROGRESS NOTES
Pt expresses concern about not having enough pain medication and states her pain does not go below a 4/10 and rises to an 8/10 quickly, even after po Oxycodone  The other concern is her SBP's in the 80's  We discussed the possibility of asking for a palliative care consult while she was here, but the patient states she will discuss w/ her oncologist from [facility]  Will discuss with Dr Skelton when he arrives on floor

## 2020-01-31 NOTE — ASSESSMENT & PLAN NOTE
With metastasis to bone and liver, ongoing low back pain  Evaluated by Oncology, recommendation for follow-up with her oncologist  Poor prognosis  Patient has an appointment with her oncologist on Monday at [facility]  I spoke with her oncologist nurse practitioner and I update her with patient's clinical and lab findings, she will call the patient  Palliative care consulted

## 2020-01-31 NOTE — CONSULTS
Consultation - [address] [facility]  Remington Lomax 49 y o  female MRN: [id number]  Unit/Bed#: [facility] 505-01 Encounter: [id number]        Demario ATKINS    Reason for Consult / Principal Problem:     Elevated liver enzymes      ASSESSMENT AND PLAN:       60-year-old female with history of metastatic breast cancer with mets to liver and bone currently on Faslodex, managed by Oncology at [facility], [address] [place] [address] Avenue with LVEF 15%, admitted with dyspnea  Workup showed elevated liver enzymes, worsening metastatic disease in the liver  GI consulted for elevated liver enzymes  1   Elevated LFTs   Likely related to worsening metastatic liver disease  Patient has low blood pressure which could be causing ischemia as well  Although patient says that she chronically has low blood pressure due to her heart disease  Patient is getting IV hydration  Patient has pneumobilia on imaging  But does not look sick  There is elevated white count  She is not a candidate for MRCP due to ICD placement  Will check hepatitis panel, blood cultures  Will discuss with attending about EUS  2  Metastatic breast cancer  Poor prognosis given cancer has not been responsive to multiple types of treatment  Palliative involved  Patient to be staffed with attending, Dr Dena Presley MD  Gastroenterology Fellow  [address]  Date: January 31, 2020        ______________________________________________________________________    HPI:   60-year-old female with history of metastatic breast cancer with mets to liver and bone currently on Faslodex, managed by Oncology at [facility], [address] [place] [address] Avenue with LVEF 15%, admitted with dyspnea  Workup showed elevated liver enzymes, worsening metastatic disease in the liver  GI consulted for elevated liver enzymes  Patient says that she has infrequent nausea and feels more bloated but no abdominal pain, vomiting  She has lack of appetite and losing weight    She nonsmoker and does not drink alcohol  Patient's blood pressure is 80s/ 50s  Which is decreased from values taken admission  Patient said that she chronically has low blood pressure as well  Other vitals are normal   Creatinine 1 84 elevated AST 1255, , alk phos 1248  AST was 519 and ALT was 205 2 days ago a alk-phos was 1165  Total bili is stable but elevated at 4 5  // alk-phos 735/ T bili 2 2 on 01/20/20  INR 1 73  CT abdomen showed numerous metastatic lesions, enlarged liver  Pneumobilia was noted but no biliary dilatation was seen  Ultrasound done which showed CBD 6 mm  There were some shadows in the CBD which could be related to air per the radiologist   There is cholelithiasis  REVIEW OF SYSTEMS:    CONSTITUTIONAL: Denies any fever, chills, rigors, and weight loss  HEENT: No earache or tinnitus  Denies hearing loss or visual disturbances  CARDIOVASCULAR: No chest pain or palpitations  RESPIRATORY: Denies any cough, hemoptysis, shortness of breath or dyspnea on exertion  GASTROINTESTINAL: As noted in the History of Present Illness  GENITOURINARY: No problems with urination  Denies any hematuria or dysuria  NEUROLOGIC: No dizziness or vertigo, denies headaches  MUSCULOSKELETAL: Denies any muscle or joint pain  SKIN: Denies skin rashes or itching  ENDOCRINE: Denies excessive thirst  Denies intolerance to heat or cold  PSYCHOSOCIAL: Denies depression or anxiety  Denies any recent memory loss         Historical Information   Past Medical History:   Diagnosis Date    Breast cancer metastasized to bone, unspecified laterality [facility] )     Cancer ([facility] )     breast 2005, mets to bones 2015    Cardiac disease     Chemotherapy follow-up examination     CHF (congestive heart failure) (HCC)     Diabetes mellitus ([facility] )     Disease of thyroid gland     Hyperlipidemia      Past Surgical History:   Procedure Laterality Date    BREAST SURGERY      CARDIAC PACEMAKER PLACEMENT      HYSTERECTOMY       Social History   Social History     Substance and Sexual Activity   Alcohol Use Never    Frequency: Never    Comment: socially     Social History     Substance and Sexual Activity   Drug Use Not Currently     Social History     Tobacco Use   Smoking Status Never Smoker   Smokeless Tobacco Never Used     Family History   Problem Relation Age of Onset    Cancer Mother     Heart disease Father     Diabetes Brother        Meds/Allergies     Medications Prior to Admission   Medication    aspirin (ECOTRIN LOW STRENGTH) 81 mg EC tablet    carvedilol (COREG) 6 25 mg tablet    cephalexin (KEFLEX) 500 mg capsule    cholecalciferol (VITAMIN D3) 1,000 units tablet    dexlansoprazole (DEXILANT) 60 MG capsule    digoxin (LANOXIN) 0 25 mg tablet    DULoxetine (CYMBALTA) 30 mg delayed release capsule    ivabradine HCl (CORLANOR) 5 MG tablet    levothyroxine 75 mcg tablet    losartan (COZAAR) 25 mg tablet    metFORMIN (GLUCOPHAGE) 500 mg tablet    [] methylprednisolone (MEDROL) 4 mg tablet    mexiletine (MEXITIL) 150 mg capsule    montelukast (SINGULAIR) 10 mg tablet    oxyCODONE (OXY-IR) 5 MG capsule    polyethylene glycol (MIRALAX) 17 g packet    potassium chloride (K-DUR,KLOR-CON) 10 mEq tablet    rosuvastatin (CRESTOR) 5 mg tablet    spironolactone (ALDACTONE) 50 mg tablet    torsemide (DEMADEX) 20 mg tablet    warfarin (COUMADIN) 5 mg tablet     Current Facility-Administered Medications   Medication Dose Route Frequency    aspirin (ECOTRIN LOW STRENGTH) EC tablet 81 mg  81 mg Oral Daily    carvedilol (COREG) tablet 6 25 mg  6 25 mg Oral BID With Meals    digoxin (LANOXIN) tablet 125 mcg  125 mcg Oral Daily    fentanyl citrate (PF) 100 MCG/2ML 25 mcg  25 mcg Intravenous Q2H PRN    gabapentin (NEURONTIN) capsule 100 mg  100 mg Oral HS    heparin (porcine) subcutaneous injection 5,000 Units  5,000 Units Subcutaneous Q8H [facility]    insulin lispro (HumaLOG) 100 units/mL subcutaneous injection 1-5 Units  1-5 Units Subcutaneous 4x Daily (AC & HS)    ivabradine HCl (CORLANOR) tablet 5 mg  5 mg Oral BID With Meals    levothyroxine tablet 88 mcg  88 mcg Oral Early Morning    lidocaine (LIDODERM) 5 % patch 1 patch  1 patch Topical Daily    mexiletine (MEXITIL) capsule 150 mg  150 mg Oral TID    montelukast (SINGULAIR) tablet 10 mg  10 mg Oral HS    oxyCODONE (ROXICODONE) IR tablet 5 mg  5 mg Oral Q4H PRN    oxyCODONE (ROXICODONE) IR tablet 5 mg  5 mg Oral Q6H    pantoprazole (PROTONIX) EC tablet 40 mg  40 mg Oral Early Morning    polyethylene glycol (MIRALAX) packet 17 g  17 g Oral Daily    sodium chloride 0 9 % infusion  75 mL/hr Intravenous Continuous       Allergies   Allergen Reactions    Ambien [Zolpidem]     Entresto [Sacubitril-Valsartan] Other (See Comments)     Hypotension     Other      Other reaction(s): Unknown  Adhesive Tape    Bee Pollen Rash     Pollen           Objective     Blood pressure (!) 88/50, pulse 91, temperature 98 2 °F (36 8 °C), temperature source Oral, resp  rate 18, height 5' 8" (1 727 m), weight 72 kg (158 lb 11 7 oz), SpO2 95 %  Body mass index is 24 13 kg/m²  Intake/Output Summary (Last 24 hours) at 1/31/2020 1311  Last data filed at 1/31/2020 0319  Gross per 24 hour   Intake 720 ml   Output 300 ml   Net 420 ml         PHYSICAL EXAM:      General Appearance:   Alert, cooperative, no distress   HEENT:   Normocephalic, atraumatic, anicteric      Neck:  Supple, symmetrical, trachea midline   Lungs:   Clear to auscultation bilaterally; no rales, rhonchi or wheezing; respirations unlabored    Heart[de-identified]   Regular rate and rhythm; no murmur, rub, or gallop     Abdomen:   Soft, non-tender, non-distended; normal bowel sounds; no masses, no organomegaly    Genitalia:   Deferred    Rectal:   Deferred    Extremities:  No cyanosis, clubbing or edema    Pulses:  2+ and symmetric all extremities    Skin:  No jaundice, rashes, or lesions    Lymph nodes:  No palpable cervical lymphadenopathy        Lab Results:   Admission on 01/29/2020   Component Date Value    WBC 01/29/2020 22 09*    RBC 01/29/2020 2 93*    Hemoglobin 01/29/2020 10 2*    Hematocrit 01/29/2020 30 8*    MCV 01/29/2020 105*    MCH 01/29/2020 34 8*    MCHC 01/29/2020 33 1     RDW 01/29/2020 15 7*    MPV 01/29/2020 11 0     Platelets 22/96/1857 328     nRBC 01/29/2020 3     Sodium 01/29/2020 132*    Potassium 01/29/2020 5 1     Chloride 01/29/2020 99*    CO2 01/29/2020 26     ANION GAP 01/29/2020 7     BUN 01/29/2020 25     Creatinine 01/29/2020 1 11     Glucose 01/29/2020 173*    Calcium 01/29/2020 9 9     AST 01/29/2020 519*    ALT 01/29/2020 205*    Alkaline Phosphatase 01/29/2020 1,165*    Total Protein 01/29/2020 7 2     Albumin 01/29/2020 2 2*    Total Bilirubin 01/29/2020 4 11*    eGFR 01/29/2020 57     Troponin I 01/29/2020 <0 02     PTT 01/29/2020 89*    Protime 01/29/2020 >120 0*    INR 01/29/2020 >15 00*    Segmented % 01/29/2020 70     Bands % 01/29/2020 2     Lymphocytes % 01/29/2020 12*    Monocytes % 01/29/2020 10     Eosinophils, % 01/29/2020 3     Basophils % 01/29/2020 0     Metamyelocytes% 01/29/2020 1     Myelocytes % 01/29/2020 2*    Absolute Neutrophils 01/29/2020 15 90*    Lymphocytes Absolute 01/29/2020 2 65     Monocytes Absolute 01/29/2020 2 21*    Eosinophils Absolute 01/29/2020 0 66*    Basophils Absolute 01/29/2020 0 00     nRBC 01/29/2020 4*    RBC Morphology 01/29/2020 Present     Polychromasia 01/29/2020 Present     Platelet Estimate 05/45/2651 Adequate     Ventricular Rate 01/29/2020 84     Atrial Rate 01/29/2020 84     MO Interval 01/29/2020 124     QRSD Interval 01/29/2020 130     QT Interval 01/29/2020 380     QTC Interval 01/29/2020 449     P Axis 01/29/2020 68     QRS Axis 01/29/2020 -67     T Wave Axis 01/29/2020 85     Protime 01/29/2020 23 6*    INR 01/29/2020 2 16*    POC Glucose 01/29/2020 183*    Sodium 01/30/2020 128*    Potassium 01/30/2020 4 3     Chloride 01/30/2020 95*    CO2 01/30/2020 26     ANION GAP 01/30/2020 7     BUN 01/30/2020 26*    Creatinine 01/30/2020 0 89     Glucose 01/30/2020 136     Calcium 01/30/2020 9 3     AST 01/30/2020 469*    ALT 01/30/2020 174*    Alkaline Phosphatase 01/30/2020 995*    Total Protein 01/30/2020 6 2*    Albumin 01/30/2020 1 9*    Total Bilirubin 01/30/2020 5 00*    eGFR 01/30/2020 74     Protime 01/30/2020 18 0*    INR 01/30/2020 1 54*    POC Glucose 01/30/2020 143*    POC Glucose 01/30/2020 202*    POC Glucose 01/30/2020 141*    POC Glucose 01/30/2020 137     Sodium 01/31/2020 126*    Potassium 01/31/2020 4 7     Chloride 01/31/2020 93*    CO2 01/31/2020 25     ANION GAP 01/31/2020 8     BUN 01/31/2020 44*    Creatinine 01/31/2020 1 84*    Glucose 01/31/2020 177*    Calcium 01/31/2020 8 9     AST 01/31/2020 1,255*    ALT 01/31/2020 417*    Alkaline Phosphatase 01/31/2020 1,248*    Total Protein 01/31/2020 6 5     Albumin 01/31/2020 1 9*    Total Bilirubin 01/31/2020 4 55*    eGFR 01/31/2020 31     WBC 01/31/2020 12 50*    RBC 01/31/2020 2 36*    Hemoglobin 01/31/2020 8 0*    Hematocrit 01/31/2020 25 2*    MCV 01/31/2020 107*    MCH 01/31/2020 33 9     MCHC 01/31/2020 31 7     RDW 01/31/2020 15 9*    MPV 01/31/2020 10 8     Platelets 19/91/8030 273     nRBC 01/31/2020 3     Protime 01/31/2020 19 8*    INR 01/31/2020 1 73*    Magnesium 01/31/2020 1 9     TSH 3RD GENERATON 01/31/2020 5 780*    POC Glucose 01/31/2020 180*    Segmented % 01/31/2020 82*    Bands % 01/31/2020 2     Lymphocytes % 01/31/2020 7*    Monocytes % 01/31/2020 7     Eosinophils, % 01/31/2020 1     Basophils % 01/31/2020 0     Metamyelocytes% 01/31/2020 1     Absolute Neutrophils 01/31/2020 10 50*    Lymphocytes Absolute 01/31/2020 0 88     Monocytes Absolute 01/31/2020 0 88     Eosinophils Absolute 01/31/2020 0 13     Basophils Absolute 01/31/2020 0 00     nRBC 01/31/2020 4*    RBC Morphology 01/31/2020 Present     Anisocytosis 01/31/2020 Present     Polychromasia 01/31/2020 Present     Platelet Estimate 46/19/0326 Adequate     POC Glucose 01/31/2020 160*       Imaging Studies: I have personally reviewed pertinent imaging studies

## 2020-01-31 NOTE — ASSESSMENT & PLAN NOTE
· Patient states that prior shortness of breath has been associated with V-tach in the past, as per EMS she had 5 beat run of V-tach  · Chest x-ray shows no acute cardiopulmonary pathology  · Cardiac echo with EF 36%  · Evaluated by Cardiology, no arrhythmia on ICD interrogation, no sign of CHF  · Continue supportive care

## 2020-01-31 NOTE — ASSESSMENT & PLAN NOTE
With hypotension and SACHI   Evaluated by Nephrology  Abdominal CT scan reviewed  Suspect multifactorial with poor oral intake, rule out hepatorenal syndrome and decreased renal perfusion given her hypotension  Gentle IV fluid for hydration  Check cortisol level and blood cultures
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
## 2020-01-31 NOTE — ASSESSMENT & PLAN NOTE
Wt Readings from Last 3 Encounters:   01/30/20 72 kg (158 lb 11 7 oz)     EF 36%  Hypotension today  ICD in place  Continue to monitor off diuretics

## 2020-01-31 NOTE — CONSULTS
Consultation - Nephrology   Remington Lomax 49 y o  female MRN: [id number]  Unit/Bed#: [facility] 505-01 Encounter: [id number]    ASSESSMENT and PLAN:  1  Acute kidney injury, suspected multifactorial, patient with poor p o  Intake, evidence of liver dysfunction secondary to metastatic disease and possible hepatorenal syndrome, hypotension and decreased renal perfusion  Will try normal saline 75 cc/hours 10 hours  Check urinalysis with urine sodium  Unfortunately given advanced metastatic breast cancer with evidence of metastatic liver disease her prognosis is poor  Oncology and palliative care on board  2  Metastatic breast cancer with evidence of widespread metastatic liver disease as well as on disease  Patient follows with hematology oncologist at [facility]  Oncology consult appreciated  Palliative care on board  3  Chronic systolic heart failure with cardiomyopathy suspected secondary to prior chemotherapy  Patient on exam does not look volume overload  4  Hyponatremia in the setting of metastatic breast cancer possible prerenal component  Will try 75 cc of normal saline for 10 hours  If serum sodium worsened will benefit from fluid restriction  Use of Samsca could be contraindicated setting of advanced liver disease  5  Hypotension, holding parameters antihypertensive medication  Start gentle intravenously fluids  SUMMARY OF RECOMMENDATIONS:  Check urinalysis with urine sodium  Start normal saline 75 cc/hour for 10 hours  Oncology input appreciated  CT scan shows diffuse metastatic liver disease  Poor prognosis  Palliative care on board    My plan and recommendation were discussed with Dr Stephanie Corrales from Internal Medicine team      HISTORY OF PRESENT ILLNESS:  Requesting Physician: Ti Martinez DO  Reason for Consult:  Acute renal failure, hyponatremia, metastatic breast cancer    Remington Lomax is a 49 y o  female who was admitted to [facility] after presenting with worsening shortness of breath  A renal consultation is requested today for assistance in the management of worsening hyponatremia and acute renal failure  Patient with known history of metastatic breast cancer follow [facility], found to liver disease as well as bone  Patient was admitted with worsening shortness of breath, cardiology on board  During this admission noted her blood pressure is been in the 24E to 90 systolic  Her kidney function worsened today as well as hyponatremia for that reason Nephrology was consulted  During my evaluation patient still is not eating or drinking too much  Noted that she has not been diuresed  Denies any chest pain, her shortness of breath improving  Complaining of lower back pain as well as bone pains  Denies any diarrhea  She is feeling nauseated but denies any vomiting        PAST MEDICAL HISTORY:  Past Medical History:   Diagnosis Date    Breast cancer metastasized to bone, unspecified laterality ([facility] [address] )     Cancer ([facility] [address] )     breast 2005, mets to bones 2015    Cardiac disease     Chemotherapy follow-up examination     CHF (congestive heart failure) (HCC)     Diabetes mellitus ([facility] [address] )     Disease of thyroid gland     Hyperlipidemia        PAST SURGICAL HISTORY:  Past Surgical History:   Procedure Laterality Date    BREAST SURGERY      CARDIAC PACEMAKER PLACEMENT      HYSTERECTOMY         SOCIAL HISTORY:  Social History     Substance and Sexual Activity   Alcohol Use Never    Frequency: Never    Comment: socially     Social History     Substance and Sexual Activity   Drug Use Not Currently     Social History     Tobacco Use   Smoking Status Never Smoker   Smokeless Tobacco Never Used       FAMILY HISTORY:  Family History   Problem Relation Age of Onset    Cancer Mother     Heart disease Father     Diabetes Brother        ALLERGIES:  Allergies   Allergen Reactions    Ambien [Zolpidem]     Entresto [Sacubitril-Valsartan] Other (See Comments)     Hypotension  Other      Other reaction(s): Unknown  Adhesive Tape    Bee Pollen Rash     Pollen       MEDICATIONS:    Current Facility-Administered Medications:     aspirin (ECOTRIN LOW STRENGTH) EC tablet 81 mg, 81 mg, Oral, Daily, Medardo Tovar MD, 81 mg at 01/31/20 1035    carvedilol (COREG) tablet 6 25 mg, 6 25 mg, Oral, BID With Meals, Medardo Tovar MD, Stopped at 01/30/20 1632    digoxin (LANOXIN) tablet 125 mcg, 125 mcg, Oral, Daily, Medardo Tovar MD, 125 mcg at 01/31/20 1035    heparin (porcine) subcutaneous injection 5,000 Units, 5,000 Units, Subcutaneous, Q8H [facility], [facility], 5,000 Units at 01/31/20 0700    insulin lispro (HumaLOG) 100 units/mL subcutaneous injection 1-5 Units, 1-5 Units, Subcutaneous, 4x Daily (AC & HS), 1 Units at 01/31/20 0751 **AND** Fingerstick Glucose (POCT), , , 4x Daily AC and at bedtime, Medardo Tovar MD    ivabradine HCl (CORLANOR) tablet 5 mg, 5 mg, Oral, BID With Meals, Monique Joseph PA-C, 5 mg at 01/31/20 0751    levothyroxine tablet 88 mcg, 88 mcg, Oral, Early Morning, Medardo Tovar MD, 88 mcg at 01/31/20 0700    lidocaine (LIDODERM) 5 % patch 1 patch, 1 patch, Topical, Daily, Monique Joseph PA-C, 1 patch at 01/31/20 0853    mexiletine (MEXITIL) capsule 150 mg, 150 mg, Oral, TID, Medardo Tovar MD, 150 mg at 01/30/20 2152    montelukast (SINGULAIR) tablet 10 mg, 10 mg, Oral, HS, Medardo Tovar MD, 10 mg at 01/30/20 2152    oxyCODONE (ROXICODONE) immediate release tablet 10 mg, 10 mg, Oral, Q6H PRN, Medardo Tovar MD, 10 mg at 01/31/20 0329    oxyCODONE (ROXICODONE) IR tablet 5 mg, 5 mg, Oral, Q4H PRN, Medardo Tovar MD, 5 mg at 01/31/20 0750    pantoprazole (PROTONIX) EC tablet 40 mg, 40 mg, Oral, Early Morning, Medardo Tovar MD, 40 mg at 01/31/20 0700    polyethylene glycol (MIRALAX) packet 17 g, 17 g, Oral, Daily, Medardo Tovar MD, 17 g at 01/31/20 1035    sodium chloride 0 9 % infusion, 75 mL/hr, Intravenous, Continuous, Ban Swartz, MD    REVIEW OF SYSTEMS:  All the systems were reviewed and were negative except as documented on the HPI      PHYSICAL EXAM:  Current Weight: Weight - Scale: 72 kg (158 lb 11 7 oz)  First Weight: Weight - Scale: 72 kg (158 lb 11 7 oz)  Vitals:    01/30/20 1901 01/30/20 2352 01/31/20 0256 01/31/20 0700   BP: (!) 89/44 94/56 (!) 89/51 (!) 88/50   BP Location: Left arm   Left arm   Pulse: 85 84 91 91   Resp: 16 16 16 18   Temp: 98 5 °F (36 9 °C) 98 4 °F (36 9 °C) 98 2 °F (36 8 °C) 98 2 °F (36 8 °C)   TempSrc: Oral Oral Oral Oral   SpO2: 95% 96% 97% 95%   Weight:       Height:           Intake/Output Summary (Last 24 hours) at 1/31/2020 1040  Last data filed at 1/31/2020 0319  Gross per 24 hour   Intake 720 ml   Output 300 ml   Net 420 ml     General: conscious, cooperative, in not acute distress  Eyes: conjunctivae pale, anicteric sclerae  ENT: lips and mucous membranes mildly dry  Neck: supple, no JVD  Chest: clear breath sounds bilateral, no crackles, ronchus or wheezings  CVS: distinct S1 & S2, normal rate, regular rhythm  Abdomen: soft, non-tender, non-distended, normoactive bowel sounds  Extremities: no significant edema of both legs  Skin: no rash  Neuro: awake, alert, oriented        Invasive Devices:        Lab Results:   Results from last 7 days   Lab Units 01/31/20  0524 01/30/20  0444 01/29/20  1150   WBC Thousand/uL 12 50*  --  22 09*   HEMOGLOBIN g/dL 8 0*  --  10 2*   HEMATOCRIT % 25 2*  --  30 8*   PLATELETS Thousands/uL 273  --  328   SODIUM mmol/L 126* 128* 132*   POTASSIUM mmol/L 4 7 4 3 5 1   CHLORIDE mmol/L 93* 95* 99*   CO2 mmol/L 25 26 26   BUN mg/dL 44* 26* 25   CREATININE mg/dL 1 84* 0 89 1 11   CALCIUM mg/dL 8 9 9 3 9 9   MAGNESIUM mg/dL 1 9  --   --    ALK PHOS U/L 1,248* 995* 1,165*   ALT U/L 417* 174* 205*   AST U/L 1,255* 469* 519*       Other Studies:   CT scan of the abdomen and pelvis without contrast showed diffuse metastatic hepatic disease, kidneys no evidence of hydronephrosis  Portions of the record may have been created with voice recognition software  Occasional wrong word or "sound a like" substitutions may have occurred due to the inherent limitations of voice recognition software  Read the chart carefully and recognize, using context, where substitutions have occurred  If you have any questions, please contact the dictating provider

## 2020-02-01 ENCOUNTER — APPOINTMENT (INPATIENT)
Dept: RADIOLOGY | Facility: HOSPITAL | Age: 55
DRG: 308 | End: 2020-02-01
Payer: MEDICARE

## 2020-02-01 LAB
ALBUMIN SERPL BCP-MCNC: 1.8 G/DL (ref 3.5–5)
ALP SERPL-CCNC: 1414 U/L (ref 46–116)
ALT SERPL W P-5'-P-CCNC: 328 U/L (ref 12–78)
ANION GAP SERPL CALCULATED.3IONS-SCNC: 9 MMOL/L (ref 4–13)
ANISOCYTOSIS BLD QL SMEAR: PRESENT
AST SERPL W P-5'-P-CCNC: 1100 U/L (ref 5–45)
BASOPHILS # BLD MANUAL: 0 THOUSAND/UL (ref 0–0.1)
BASOPHILS NFR MAR MANUAL: 0 % (ref 0–1)
BILIRUB SERPL-MCNC: 5.02 MG/DL (ref 0.2–1)
BUN SERPL-MCNC: 61 MG/DL (ref 5–25)
CALCIUM SERPL-MCNC: 8.9 MG/DL (ref 8.3–10.1)
CHLORIDE SERPL-SCNC: 93 MMOL/L (ref 100–108)
CO2 SERPL-SCNC: 23 MMOL/L (ref 21–32)
CORTIS SERPL-MCNC: 22.9 UG/DL
CREAT SERPL-MCNC: 2.43 MG/DL (ref 0.6–1.3)
EOSINOPHIL # BLD MANUAL: 0.16 THOUSAND/UL (ref 0–0.4)
EOSINOPHIL NFR BLD MANUAL: 1 % (ref 0–6)
ERYTHROCYTE [DISTWIDTH] IN BLOOD BY AUTOMATED COUNT: 16.2 % (ref 11.6–15.1)
GFR SERPL CREATININE-BSD FRML MDRD: 22 ML/MIN/1.73SQ M
GLUCOSE SERPL-MCNC: 115 MG/DL (ref 65–140)
GLUCOSE SERPL-MCNC: 138 MG/DL (ref 65–140)
GLUCOSE SERPL-MCNC: 138 MG/DL (ref 65–140)
GLUCOSE SERPL-MCNC: 146 MG/DL (ref 65–140)
GLUCOSE SERPL-MCNC: 200 MG/DL (ref 65–140)
GLUCOSE SERPL-MCNC: 92 MG/DL (ref 65–140)
HCT VFR BLD AUTO: 24.7 % (ref 34.8–46.1)
HGB BLD-MCNC: 7.9 G/DL (ref 11.5–15.4)
LYMPHOCYTES # BLD AUTO: 0.97 THOUSAND/UL (ref 0.6–4.47)
LYMPHOCYTES # BLD AUTO: 6 % (ref 14–44)
MACROCYTES BLD QL AUTO: PRESENT
MCH RBC QN AUTO: 34.1 PG (ref 26.8–34.3)
MCHC RBC AUTO-ENTMCNC: 32 G/DL (ref 31.4–37.4)
MCV RBC AUTO: 107 FL (ref 82–98)
METAMYELOCYTES NFR BLD MANUAL: 1 % (ref 0–1)
MONOCYTES # BLD AUTO: 0.97 THOUSAND/UL (ref 0–1.22)
MONOCYTES NFR BLD: 6 % (ref 4–12)
NEUTROPHILS # BLD MANUAL: 13.87 THOUSAND/UL (ref 1.85–7.62)
NEUTS SEG NFR BLD AUTO: 86 % (ref 43–75)
NRBC BLD AUTO-RTO: 2 /100 WBC (ref 0–2)
NRBC BLD AUTO-RTO: 2 /100 WBCS
PLATELET # BLD AUTO: 241 THOUSANDS/UL (ref 149–390)
PLATELET BLD QL SMEAR: ADEQUATE
PMV BLD AUTO: 10.7 FL (ref 8.9–12.7)
POLYCHROMASIA BLD QL SMEAR: PRESENT
POTASSIUM SERPL-SCNC: 5.2 MMOL/L (ref 3.5–5.3)
PROT SERPL-MCNC: 6.3 G/DL (ref 6.4–8.2)
RBC # BLD AUTO: 2.32 MILLION/UL (ref 3.81–5.12)
RBC MORPH BLD: PRESENT
SODIUM SERPL-SCNC: 125 MMOL/L (ref 136–145)
WBC # BLD AUTO: 16.13 THOUSAND/UL (ref 4.31–10.16)

## 2020-02-01 PROCEDURE — 99232 SBSQ HOSP IP/OBS MODERATE 35: CPT | Performed by: INTERNAL MEDICINE

## 2020-02-01 PROCEDURE — 02HV33Z INSERTION OF INFUSION DEVICE INTO SUPERIOR VENA CAVA, PERCUTANEOUS APPROACH: ICD-10-PCS | Performed by: INTERNAL MEDICINE

## 2020-02-01 PROCEDURE — 36556 INSERT NON-TUNNEL CV CATH: CPT | Performed by: EMERGENCY MEDICINE

## 2020-02-01 PROCEDURE — 03HY32Z INSERTION OF MONITORING DEVICE INTO UPPER ARTERY, PERCUTANEOUS APPROACH: ICD-10-PCS | Performed by: INTERNAL MEDICINE

## 2020-02-01 PROCEDURE — 85027 COMPLETE CBC AUTOMATED: CPT | Performed by: INTERNAL MEDICINE

## 2020-02-01 PROCEDURE — 82948 REAGENT STRIP/BLOOD GLUCOSE: CPT

## 2020-02-01 PROCEDURE — NC001 PR NO CHARGE: Performed by: EMERGENCY MEDICINE

## 2020-02-01 PROCEDURE — 87040 BLOOD CULTURE FOR BACTERIA: CPT | Performed by: INTERNAL MEDICINE

## 2020-02-01 PROCEDURE — 4A133J1 MONITORING OF ARTERIAL PULSE, PERIPHERAL, PERCUTANEOUS APPROACH: ICD-10-PCS | Performed by: INTERNAL MEDICINE

## 2020-02-01 PROCEDURE — 4A133B1 MONITORING OF ARTERIAL PRESSURE, PERIPHERAL, PERCUTANEOUS APPROACH: ICD-10-PCS | Performed by: INTERNAL MEDICINE

## 2020-02-01 PROCEDURE — NC001 PR NO CHARGE: Performed by: PHYSICIAN ASSISTANT

## 2020-02-01 PROCEDURE — 99291 CRITICAL CARE FIRST HOUR: CPT | Performed by: EMERGENCY MEDICINE

## 2020-02-01 PROCEDURE — 99233 SBSQ HOSP IP/OBS HIGH 50: CPT | Performed by: INTERNAL MEDICINE

## 2020-02-01 PROCEDURE — 85007 BL SMEAR W/DIFF WBC COUNT: CPT | Performed by: INTERNAL MEDICINE

## 2020-02-01 PROCEDURE — 71045 X-RAY EXAM CHEST 1 VIEW: CPT

## 2020-02-01 PROCEDURE — 36620 INSERTION CATHETER ARTERY: CPT

## 2020-02-01 PROCEDURE — 82533 TOTAL CORTISOL: CPT | Performed by: INTERNAL MEDICINE

## 2020-02-01 PROCEDURE — 80053 COMPREHEN METABOLIC PANEL: CPT | Performed by: INTERNAL MEDICINE

## 2020-02-01 RX ORDER — SODIUM CHLORIDE, SODIUM LACTATE, POTASSIUM CHLORIDE, CALCIUM CHLORIDE 600; 310; 30; 20 MG/100ML; MG/100ML; MG/100ML; MG/100ML
75 INJECTION, SOLUTION INTRAVENOUS CONTINUOUS
Status: DISCONTINUED | OUTPATIENT
Start: 2020-02-01 | End: 2020-02-02

## 2020-02-01 RX ORDER — ONDANSETRON 2 MG/ML
4 INJECTION INTRAMUSCULAR; INTRAVENOUS EVERY 4 HOURS PRN
Status: DISCONTINUED | OUTPATIENT
Start: 2020-02-01 | End: 2020-02-11 | Stop reason: HOSPADM

## 2020-02-01 RX ORDER — LIDOCAINE HYDROCHLORIDE 10 MG/ML
INJECTION, SOLUTION EPIDURAL; INFILTRATION; INTRACAUDAL; PERINEURAL
Status: COMPLETED
Start: 2020-02-01 | End: 2020-02-01

## 2020-02-01 RX ORDER — ALBUMIN, HUMAN INJ 5% 5 %
SOLUTION INTRAVENOUS
Status: COMPLETED
Start: 2020-02-01 | End: 2020-02-01

## 2020-02-01 RX ORDER — ALBUMIN, HUMAN INJ 5% 5 %
25 SOLUTION INTRAVENOUS ONCE
Status: COMPLETED | OUTPATIENT
Start: 2020-02-01 | End: 2020-02-01

## 2020-02-01 RX ORDER — ALBUMIN (HUMAN) 12.5 G/50ML
12.5 SOLUTION INTRAVENOUS EVERY 6 HOURS
Status: COMPLETED | OUTPATIENT
Start: 2020-02-01 | End: 2020-02-04

## 2020-02-01 RX ORDER — ONDANSETRON 2 MG/ML
INJECTION INTRAMUSCULAR; INTRAVENOUS
Status: COMPLETED
Start: 2020-02-01 | End: 2020-02-01

## 2020-02-01 RX ORDER — NOREPINEPHRINE BITARTRATE 1 MG/ML
INJECTION, SOLUTION INTRAVENOUS
Status: COMPLETED
Start: 2020-02-01 | End: 2020-02-01

## 2020-02-01 RX ADMIN — DIGOXIN 125 MCG: 125 TABLET ORAL at 09:29

## 2020-02-01 RX ADMIN — LIDOCAINE HYDROCHLORIDE 1 ML: 10 INJECTION, SOLUTION EPIDURAL; INFILTRATION; INTRACAUDAL; PERINEURAL at 18:55

## 2020-02-01 RX ADMIN — SODIUM CHLORIDE, SODIUM LACTATE, POTASSIUM CHLORIDE, AND CALCIUM CHLORIDE 1000 ML: .6; .31; .03; .02 INJECTION, SOLUTION INTRAVENOUS at 21:46

## 2020-02-01 RX ADMIN — IVABRADINE 5 MG: 5 TABLET, FILM COATED ORAL at 15:41

## 2020-02-01 RX ADMIN — OXYCODONE HYDROCHLORIDE 5 MG: 5 TABLET ORAL at 03:43

## 2020-02-01 RX ADMIN — OXYCODONE HYDROCHLORIDE 5 MG: 5 TABLET ORAL at 12:43

## 2020-02-01 RX ADMIN — ONDANSETRON 4 MG: 2 INJECTION INTRAMUSCULAR; INTRAVENOUS at 15:40

## 2020-02-01 RX ADMIN — NOREPINEPHRINE BITARTRATE 2 MCG/MIN: 1 INJECTION INTRAVENOUS at 20:17

## 2020-02-01 RX ADMIN — IVABRADINE 5 MG: 5 TABLET, FILM COATED ORAL at 09:30

## 2020-02-01 RX ADMIN — ASPIRIN 81 MG: 81 TABLET ORAL at 09:29

## 2020-02-01 RX ADMIN — MONTELUKAST SODIUM 10 MG: 10 TABLET, FILM COATED ORAL at 21:45

## 2020-02-01 RX ADMIN — PANTOPRAZOLE SODIUM 40 MG: 40 TABLET, DELAYED RELEASE ORAL at 05:48

## 2020-02-01 RX ADMIN — GABAPENTIN 100 MG: 100 CAPSULE ORAL at 21:45

## 2020-02-01 RX ADMIN — HEPARIN SODIUM 5000 UNITS: 5000 INJECTION INTRAVENOUS; SUBCUTANEOUS at 21:45

## 2020-02-01 RX ADMIN — OXYCODONE HYDROCHLORIDE 5 MG: 5 TABLET ORAL at 05:48

## 2020-02-01 RX ADMIN — HEPARIN SODIUM 5000 UNITS: 5000 INJECTION INTRAVENOUS; SUBCUTANEOUS at 15:00

## 2020-02-01 RX ADMIN — TOLVAPTAN 15 MG: 15 TABLET ORAL at 15:01

## 2020-02-01 RX ADMIN — HEPARIN SODIUM 5000 UNITS: 5000 INJECTION INTRAVENOUS; SUBCUTANEOUS at 05:47

## 2020-02-01 RX ADMIN — SODIUM CHLORIDE, SODIUM LACTATE, POTASSIUM CHLORIDE, AND CALCIUM CHLORIDE 75 ML/HR: .6; .31; .03; .02 INJECTION, SOLUTION INTRAVENOUS at 23:41

## 2020-02-01 RX ADMIN — ALBUMIN (HUMAN) 25 G: 12.5 SOLUTION INTRAVENOUS at 19:31

## 2020-02-01 RX ADMIN — MEXILETINE HYDROCHLORIDE 150 MG: 150 CAPSULE ORAL at 15:41

## 2020-02-01 RX ADMIN — LEVOTHYROXINE SODIUM 88 MCG: 88 TABLET ORAL at 05:55

## 2020-02-01 RX ADMIN — OXYCODONE HYDROCHLORIDE 5 MG: 5 TABLET ORAL at 19:42

## 2020-02-01 RX ADMIN — MEXILETINE HYDROCHLORIDE 150 MG: 150 CAPSULE ORAL at 21:45

## 2020-02-01 RX ADMIN — NOREPINEPHRINE BITARTRATE: 1 INJECTION INTRAVENOUS at 20:17

## 2020-02-01 RX ADMIN — POLYETHYLENE GLYCOL 3350 17 G: 17 POWDER, FOR SOLUTION ORAL at 12:43

## 2020-02-01 RX ADMIN — LIDOCAINE 1 PATCH: 50 PATCH TOPICAL at 09:29

## 2020-02-01 RX ADMIN — ALBUMIN, HUMAN INJ 5% 25 G: 5 SOLUTION at 19:31

## 2020-02-01 RX ADMIN — ALBUMIN (HUMAN) 12.5 G: 0.25 INJECTION, SOLUTION INTRAVENOUS at 20:07

## 2020-02-01 RX ADMIN — SODIUM CHLORIDE, SODIUM LACTATE, POTASSIUM CHLORIDE, AND CALCIUM CHLORIDE 1000 ML: .6; .31; .03; .02 INJECTION, SOLUTION INTRAVENOUS at 15:48

## 2020-02-01 RX ADMIN — OXYCODONE HYDROCHLORIDE 5 MG: 5 TABLET ORAL at 01:00

## 2020-02-01 RX ADMIN — MEXILETINE HYDROCHLORIDE 150 MG: 150 CAPSULE ORAL at 09:31

## 2020-02-01 NOTE — PROGRESS NOTES
Pt had multiple consults added to her care today including Nephrology, GI, and Palliative whilst already having oncology on board as well SLIM as primary  GI did order blood cultures 2/2 increase WBC count and pneumobilia, I spoke with GI Dr Stephanie Vera and he informed me this was prior to direction of care as noted in palliative care note by Dr Titus Bolaños accompanied by Dr Lashawn Bedoya of Oncology about comfort care/hospice if GI could not intervene  Dr Stephanie Vrea stated to me that they cannot currently offer any interventions at this time as "unlikely to improve with any drainage intervention" in reference to liver US and abdominal CT scan and would defer to palliative and SLIM to hold off on blood cultures until direction of care is figured out with patient in the morning  Pt is currently MS non-tele, pressures remain 80s/50s and patient states "I just want to rest and not have pain " Will continue to monitor patient and obtain labs in am per [facility] discretion

## 2020-02-01 NOTE — ASSESSMENT & PLAN NOTE
Likely related to worsening metastasis liver disease  Jaundice  Liver ultrasound with widespread metastatic disease  Abdominal CT scan with pneumobilia  Evaluated by GI, no biliary obstruction  Continue supportive care

## 2020-02-01 NOTE — ASSESSMENT & PLAN NOTE
With metastasis to bone and liver, ongoing low back pain  Evaluated by Oncology, recommendation for follow-up with her oncologist  Poor prognosis  Patient has an appointment with her oncologist on Monday at [facility]  I spoke with her oncologist nurse practitioner and I update her with patient's clinical and lab findings  She spoke with the patient  Palliative Care is following  Patient currently like to continue with full code and with above treatment

## 2020-02-01 NOTE — ASSESSMENT & PLAN NOTE
With hypotension and SACHI, now with worsening creatinine and sodium  Evaluated by Nephrology  Abdominal CT scan reviewed  Suspect multifactorial with poor oral intake, rule out hepatorenal syndrome and decreased renal perfusion given her hypotension  Nephrology is following  Poor prognosis   Normal cortisol level  Follow on blood cultures

## 2020-02-01 NOTE — PROGRESS NOTES
Progress Note - Love Montelongo 1965, 49 y o  female MRN: [id number]    Unit/Bed#: [facility] 505-01 Encounter: [id number]    Primary Care Provider: Florence Martinez MD   Date and time admitted to hospital: 1/29/2020 11:33 AM        * SOB (shortness of breath)  Assessment & Plan  · Patient states that prior shortness of breath has been associated with V-tach in the past, as per EMS she had 5 beat run of V-tach  · Chest x-ray shows no acute cardiopulmonary pathology  · Cardiac echo with EF 36%  · Evaluated by Cardiology, no arrhythmia on ICD interrogation, no sign of CHF  · Improving  · Continue supportive care      Hyponatremia  Assessment & Plan  With hypotension and SACHI, now with worsening creatinine and sodium  Evaluated by Nephrology  Abdominal CT scan reviewed  Suspect multifactorial with poor oral intake, rule out hepatorenal syndrome and decreased renal perfusion given her hypotension  Nephrology is following  Poor prognosis   Normal cortisol level  Follow on blood cultures    Cardiomyopathy secondary to drug [facility])  Assessment & Plan  Nonischemic cardiomyopathy, previous EF 25%,  now 36%  Secondary to chemotherapy       Chronic systolic heart failure ([address] )  Assessment & Plan  Wt Readings from Last 3 Encounters:   01/30/20 72 kg (158 lb 11 7 oz)     EF 36%  Continue to be hypotensive  ICD in place  Continue to monitor off diuretics    Elevated LFTs  Assessment & Plan  Likely related to worsening metastasis liver disease  Jaundice  Liver ultrasound with widespread metastatic disease  Abdominal CT scan with pneumobilia  Evaluated by GI, no biliary obstruction  Continue supportive care        Supratherapeutic INR  Assessment & Plan  History of RA thrombus on Coumadin  given 5 mg IV vitamin K in ER  Monitor off Coumadin    Metastatic breast cancer ([facility] [address] )  Assessment & Plan  With metastasis to bone and liver, ongoing low back pain  Evaluated by Oncology, recommendation for follow-up with her oncologist  Poor prognosis  Patient has an appointment with her oncologist on Monday at [facility]  I spoke with her oncologist nurse practitioner and I update her with patient's clinical and lab findings  She spoke with the patient  Palliative Care is following  Patient currently like to continue with full code and with above treatment    Chronic atrial fibrillation  Assessment & Plan  Hypotensive,  Rate controlled  Hold digoxin  Coumadin still on hold  Monitor    Ventricular tachycardia ([address] )  Assessment & Plan  No arrhythmia on ICD interrogation  On mexiletine  Monitor    Patient's friend at bedside  Discussed with her in details about patient poor prognosis  She understand that the patient is very sick and is dying  She will talk to her about the code status      VTE Pharmacologic Prophylaxis:   Pharmacologic: Heparin  Mechanical VTE Prophylaxis in Place: Yes    Patient Centered Rounds: I have performed bedside rounds with nursing staff today  Discussions with Specialists or Other Care Team Provider:     Education and Discussions with Family / Patient:  Patient, I offered to call her friend or her brother she declined    Time Spent for Care: 40 minutes  More than 50% of total time spent on counseling and coordination of care as described above      Current Length of Stay: 3 day(s)    Current Patient Status: Inpatient   Certification Statement: The patient will continue to require additional inpatient hospital stay due to Above    Discharge Plan / Estimated Discharge Date: TBD    Code Status: Level 1 - Full Code      Subjective:   Patient seen and examined  Comfortable in bed  Denied pain  Worsening hyponatremia and creatinine  Still hypotensive    Objective:     Vitals:   Temp (24hrs), Av 5 °F (37 5 °C), Min:99 1 °F (37 3 °C), Max:100 6 °F (38 1 °C)    Temp:  [99 1 °F (37 3 °C)-100 6 °F (38 1 °C)] 99 3 °F (37 4 °C)  HR:  [] 80  Resp:  [16-18] 17  BP: (80-86)/(39-52) 80/43  SpO2:  [94 %-98 %] 95 %  Body mass index is 24 13 kg/m²  Input and Output Summary (last 24 hours): Intake/Output Summary (Last 24 hours) at 2/1/2020 1026  Last data filed at 2/1/2020 0343  Gross per 24 hour   Intake 1415 ml   Output 900 ml   Net 515 ml       Physical Exam:     Physical Exam  Patient is awake alert in no acute distress  Jaundice  Lung clear to auscultation bilateral  Heart positive S1-S2 no murmur  Abdomen soft nontender  Lower extremities no edema    Additional Data:     Labs:    Results from last 7 days   Lab Units 02/01/20  0545   WBC Thousand/uL 16 13*   HEMOGLOBIN g/dL 7 9*   HEMATOCRIT % 24 7*   PLATELETS Thousands/uL 241   LYMPHO PCT % 6*   MONO PCT % 6   EOS PCT % 1     Results from last 7 days   Lab Units 02/01/20  0545   POTASSIUM mmol/L 5 2   CHLORIDE mmol/L 93*   CO2 mmol/L 23   BUN mg/dL 61*   CREATININE mg/dL 2 43*   CALCIUM mg/dL 8 9   ALK PHOS U/L 1,414*   ALT U/L 328*   AST U/L 1,100*     Results from last 7 days   Lab Units 01/31/20  0524   INR  1 73*       * I Have Reviewed All Lab Data Listed Above  * Additional Pertinent Lab Tests Reviewed:  Sybil 67 Admission Reviewed    Imaging:    Imaging Reports Reviewed Today Include:   Imaging Personally Reviewed by Myself Includes:     Recent Cultures (last 7 days):           Last 24 Hours Medication List:     Current Facility-Administered Medications:  aspirin 81 mg Oral Daily Yasir Hall MD   carvedilol 6 25 mg Oral BID With Meals Yasir Hall MD   fentanyl citrate (PF) 25 mcg Intravenous Q2H PRN Kika Boles,    gabapentin 100 mg Oral HS Kika ISAC MillanasDO   heparin (porcine) 5,000 Units Subcutaneous [facility],    insulin lispro 1-5 Units Subcutaneous 4x Daily (AC & HS) Yasir Hall MD   ivabradine HCl 5 mg Oral BID With Meals Monique Joseph PA-C   levothyroxine 88 mcg Oral Early Morning Yasir Hall MD   lidocaine 1 patch Topical Daily Monique Joseph PA-C   mexiletine 150 mg Oral TID Yasir Hall MD   montelukast 10 mg Oral HS Sherman Mayorga MD   oxyCODONE 5 mg Oral Q4H PRN Sherman Mayorga MD   oxyCODONE 5 mg Oral Q6H Kika Boles DO   pantoprazole 40 mg Oral Early Morning Sherman Mayorga MD   polyethylene glycol 17 g Oral Daily Sherman Mayorga MD        Today, Patient Was Seen By: González Bolden DO    ** Please Note: This note has been constructed using a voice recognition system   **

## 2020-02-01 NOTE — ASSESSMENT & PLAN NOTE
· Patient states that prior shortness of breath has been associated with V-tach in the past, as per EMS she had 5 beat run of V-tach  · Chest x-ray shows no acute cardiopulmonary pathology  · Cardiac echo with EF 36%  · Evaluated by Cardiology, no arrhythmia on ICD interrogation, no sign of CHF  · Improving  · Continue supportive care

## 2020-02-01 NOTE — PROGRESS NOTES
NEPHROLOGY PROGRESS NOTE   Yan Mora 49 y o  female MRN: [id number]  Unit/Bed#: [facility] 505-01 Encounter: [id number]      ASSESSMENT & PLAN:  1  Acute kidney injury suspected multifactorial   Unfortunately renal function continues to decrease despite intravenously fluids  Urinalysis bland  Urine sodium 55  Suspected hepatorenal syndrome the setting of worsening metastatic liver disease  Discussed with patient about her worsening kidney function  We discussed above overall grim prognosis given underlying metastatic breast cancer  Patient expressed understanding  2  Hyponatremia in the setting of malignancy  Will give 1 dose of Samsca 15 mg x1  No need for fluid restriction  Discussed with patient regarding risk of Samsca causing liver disease  Given her overall poor prognosis, I think benefit of Samsca to correct her sodium as well as to avoid fluid restriction at this moment is higher than the risk  She agree with the plan    3  Metastatic breast cancer with wide spread metastatic disease as well as bone disease  Oncology and palliative on board  4  Abnormal LFTs in the setting of significant metastatic liver disease  GI on board  My plan and recommendations were discussed with Internal Medicine attending  Overall poor prognosis  Agree with palliative approach  SUBJECTIVE:  Patient seen and examined, denies any chest pain or shortness of breath, complaining of lower back pain, some nausea no vomiting      OBJECTIVE:  Current Weight: Weight - Scale: 72 kg (158 lb 11 7 oz)  Vitals:    02/01/20 0929   BP:    Pulse: 80   Resp:    Temp:    SpO2:        Intake/Output Summary (Last 24 hours) at 2/1/2020 1240  Last data filed at 2/1/2020 0830  Gross per 24 hour   Intake 1415 ml   Output 900 ml   Net 515 ml     General: conscious, cooperative, in not acute distress  Eyes: conjunctivae pale, icteric sclerae  ENT: lips and mucous membranes moist  Neck: supple, no JVD  Chest: clear breath sounds bilateral, no crackles, ronchus or wheezings  CVS: distinct S1 & S2, normal rate, regular rhythm  Abdomen: soft, non-tender, non-distended, normoactive bowel sounds  Extremities: no significant edema of both legs  Skin: no rash  Neuro: awake, alert, oriented        Medications:    Current Facility-Administered Medications:     aspirin (ECOTRIN LOW STRENGTH) EC tablet 81 mg, 81 mg, Oral, Daily, Sarah Steele MD, 81 mg at 02/01/20 6825    carvedilol (COREG) tablet 6 25 mg, 6 25 mg, Oral, BID With Meals, Sarah Steele MD, Stopped at 01/30/20 1632    fentanyl citrate (PF) 100 MCG/2ML 25 mcg, 25 mcg, Intravenous, Q2H PRN, Kika Boles DO    gabapentin (NEURONTIN) capsule 100 mg, 100 mg, Oral, HS, Kika Boles DO, 100 mg at 01/31/20 2200    heparin (porcine) subcutaneous injection 5,000 Units, 5,000 Units, Subcutaneous, Q8H [facility], 5,000 Units at 02/01/20 0547    insulin lispro (HumaLOG) 100 units/mL subcutaneous injection 1-5 Units, 1-5 Units, Subcutaneous, 4x Daily (AC & HS), 1 Units at 01/31/20 1639 **AND** Fingerstick Glucose (POCT), , , 4x Daily AC and at bedtime, Sarah Steele MD    ivabradine HCl (CORLANOR) tablet 5 mg, 5 mg, Oral, BID With Meals, Mnoique Joseph PA-C, 5 mg at 02/01/20 0930    levothyroxine tablet 88 mcg, 88 mcg, Oral, Early Morning, Sarah Steele MD, 88 mcg at 02/01/20 0555    lidocaine (LIDODERM) 5 % patch 1 patch, 1 patch, Topical, Daily, Monique Joseph PA-C, 1 patch at 02/01/20 0929    mexiletine (MEXITIL) capsule 150 mg, 150 mg, Oral, TID, Sarah Steele MD, 150 mg at 02/01/20 0931    montelukast (SINGULAIR) tablet 10 mg, 10 mg, Oral, HS, Sarah Steele MD, 10 mg at 01/31/20 2200    oxyCODONE (ROXICODONE) IR tablet 5 mg, 5 mg, Oral, Q4H PRN, Sarah Steele MD, 5 mg at 02/01/20 0343    oxyCODONE (ROXICODONE) IR tablet 5 mg, 5 mg, Oral, Q6H, Kika Boles DO, 5 mg at 02/01/20 0548    pantoprazole (PROTONIX) EC tablet 40 mg, 40 mg, Oral, Early Morning, Gurmeet Taylor MD, 40 mg at 02/01/20 0548    polyethylene glycol (MIRALAX) packet 17 g, 17 g, Oral, Daily, Gurmeet Taylor MD, 17 g at 01/31/20 1035    tolvaptan (SAMSCA) split tablet 15 mg, 15 mg, Oral, Once, Wsely Teran MD    Invasive Devices:        Lab Results:   Results from last 7 days   Lab Units 02/01/20  0545 01/31/20  0524 01/30/20  0444 01/29/20  1150   WBC Thousand/uL 16 13* 12 50*  --  22 09*   HEMOGLOBIN g/dL 7 9* 8 0*  --  10 2*   HEMATOCRIT % 24 7* 25 2*  --  30 8*   PLATELETS Thousands/uL 241 273  --  328   SODIUM mmol/L 125* 126* 128* 132*   POTASSIUM mmol/L 5 2 4 7 4 3 5 1   CHLORIDE mmol/L 93* 93* 95* 99*   CO2 mmol/L 23 25 26 26   BUN mg/dL 61* 44* 26* 25   CREATININE mg/dL 2 43* 1 84* 0 89 1 11   CALCIUM mg/dL 8 9 8 9 9 3 9 9   MAGNESIUM mg/dL  --  1 9  --   --    ALK PHOS U/L 1,414* 1,248* 995* 1,165*   ALT U/L 328* 417* 174* 205*   AST U/L 1,100* 1,255* 469* 519*           Portions of the record may have been created with voice recognition software  Occasional wrong word or "sound a like" substitutions may have occurred due to the inherent limitations of voice recognition software  Read the chart carefully and recognize, using context, where substitutions have occurred  If you have any questions, please contact the dictating provider

## 2020-02-01 NOTE — ASSESSMENT & PLAN NOTE
Wt Readings from Last 3 Encounters:   01/30/20 72 kg (158 lb 11 7 oz)     EF 36%  Continue to be hypotensive  ICD in place  Continue to monitor off diuretics

## 2020-02-02 LAB
ABO GROUP BLD: NORMAL
ABO GROUP BLD: NORMAL
ALBUMIN SERPL BCP-MCNC: 2.3 G/DL (ref 3.5–5)
ALP SERPL-CCNC: 1094 U/L (ref 46–116)
ALT SERPL W P-5'-P-CCNC: 200 U/L (ref 12–78)
ANION GAP SERPL CALCULATED.3IONS-SCNC: 8 MMOL/L (ref 4–13)
ANISOCYTOSIS BLD QL SMEAR: PRESENT
AST SERPL W P-5'-P-CCNC: 508 U/L (ref 5–45)
BASE EX.OXY STD BLDV CALC-SCNC: 58.9 % (ref 60–80)
BASE EXCESS BLDV CALC-SCNC: -4.1 MMOL/L
BASOPHILS # BLD MANUAL: 0 THOUSAND/UL (ref 0–0.1)
BASOPHILS NFR MAR MANUAL: 0 % (ref 0–1)
BILIRUB SERPL-MCNC: 6.03 MG/DL (ref 0.2–1)
BLD GP AB SCN SERPL QL: NEGATIVE
BODY TEMPERATURE: 98.2 DEGREES FEHRENHEIT
BUN SERPL-MCNC: 47 MG/DL (ref 5–25)
CALCIUM SERPL-MCNC: 9.1 MG/DL (ref 8.3–10.1)
CHLORIDE SERPL-SCNC: 100 MMOL/L (ref 100–108)
CO2 SERPL-SCNC: 22 MMOL/L (ref 21–32)
CREAT SERPL-MCNC: 1.71 MG/DL (ref 0.6–1.3)
EOSINOPHIL # BLD MANUAL: 0.37 THOUSAND/UL (ref 0–0.4)
EOSINOPHIL NFR BLD MANUAL: 2 % (ref 0–6)
ERYTHROCYTE [DISTWIDTH] IN BLOOD BY AUTOMATED COUNT: 16.6 % (ref 11.6–15.1)
GFR SERPL CREATININE-BSD FRML MDRD: 34 ML/MIN/1.73SQ M
GLUCOSE SERPL-MCNC: 138 MG/DL (ref 65–140)
GLUCOSE SERPL-MCNC: 156 MG/DL (ref 65–140)
GLUCOSE SERPL-MCNC: 193 MG/DL (ref 65–140)
GLUCOSE SERPL-MCNC: 213 MG/DL (ref 65–140)
GLUCOSE SERPL-MCNC: 239 MG/DL (ref 65–140)
HCO3 BLDV-SCNC: 20.7 MMOL/L (ref 24–30)
HCT VFR BLD AUTO: 20.3 % (ref 34.8–46.1)
HGB BLD-MCNC: 6.8 G/DL (ref 11.5–15.4)
HGB BLD-MCNC: 7.9 G/DL (ref 11.5–15.4)
INR PPP: 2.51 (ref 0.84–1.19)
LYMPHOCYTES # BLD AUTO: 0.93 THOUSAND/UL (ref 0.6–4.47)
LYMPHOCYTES # BLD AUTO: 5 % (ref 14–44)
MACROCYTES BLD QL AUTO: PRESENT
MCH RBC QN AUTO: 34.5 PG (ref 26.8–34.3)
MCHC RBC AUTO-ENTMCNC: 33.5 G/DL (ref 31.4–37.4)
MCV RBC AUTO: 103 FL (ref 82–98)
MONOCYTES # BLD AUTO: 0.74 THOUSAND/UL (ref 0–1.22)
MONOCYTES NFR BLD: 4 % (ref 4–12)
NEUTROPHILS # BLD MANUAL: 16.47 THOUSAND/UL (ref 1.85–7.62)
NEUTS BAND NFR BLD MANUAL: 1 % (ref 0–8)
NEUTS SEG NFR BLD AUTO: 88 % (ref 43–75)
NON VENT ROOM AIR: 21 %
NRBC BLD AUTO-RTO: 1 /100 WBCS
O2 CT BLDV-SCNC: 6.2 ML/DL
PCO2 BLD: 36 MM HG (ref 42–50)
PCO2 BLDV: 36.3 MM HG (ref 42–50)
PH BLD: 7.38 [PH] (ref 7.3–7.4)
PH BLDV: 7.37 [PH] (ref 7.3–7.4)
PLATELET # BLD AUTO: 212 THOUSANDS/UL (ref 149–390)
PLATELET BLD QL SMEAR: ADEQUATE
PMV BLD AUTO: 10.7 FL (ref 8.9–12.7)
PO2 BLDV: 33.8 MM HG (ref 35–45)
PO2 VENOUS TEMP CORRECTED: 33.3 MM HG (ref 35–45)
POLYCHROMASIA BLD QL SMEAR: PRESENT
POTASSIUM SERPL-SCNC: 4.7 MMOL/L (ref 3.5–5.3)
PROT SERPL-MCNC: 6 G/DL (ref 6.4–8.2)
PROTHROMBIN TIME: 26.6 SECONDS (ref 11.6–14.5)
RBC # BLD AUTO: 1.97 MILLION/UL (ref 3.81–5.12)
RBC MORPH BLD: PRESENT
RH BLD: POSITIVE
RH BLD: POSITIVE
SODIUM SERPL-SCNC: 130 MMOL/L (ref 136–145)
SPECIMEN EXPIRATION DATE: NORMAL
WBC # BLD AUTO: 18.5 THOUSAND/UL (ref 4.31–10.16)

## 2020-02-02 PROCEDURE — P9016 RBC LEUKOCYTES REDUCED: HCPCS

## 2020-02-02 PROCEDURE — 85027 COMPLETE CBC AUTOMATED: CPT | Performed by: PHYSICIAN ASSISTANT

## 2020-02-02 PROCEDURE — 30233N1 TRANSFUSION OF NONAUTOLOGOUS RED BLOOD CELLS INTO PERIPHERAL VEIN, PERCUTANEOUS APPROACH: ICD-10-PCS | Performed by: INTERNAL MEDICINE

## 2020-02-02 PROCEDURE — 86901 BLOOD TYPING SEROLOGIC RH(D): CPT | Performed by: PHYSICIAN ASSISTANT

## 2020-02-02 PROCEDURE — 99233 SBSQ HOSP IP/OBS HIGH 50: CPT | Performed by: INTERNAL MEDICINE

## 2020-02-02 PROCEDURE — 82948 REAGENT STRIP/BLOOD GLUCOSE: CPT

## 2020-02-02 PROCEDURE — 85007 BL SMEAR W/DIFF WBC COUNT: CPT | Performed by: PHYSICIAN ASSISTANT

## 2020-02-02 PROCEDURE — 80053 COMPREHEN METABOLIC PANEL: CPT | Performed by: PHYSICIAN ASSISTANT

## 2020-02-02 PROCEDURE — 85610 PROTHROMBIN TIME: CPT | Performed by: PHYSICIAN ASSISTANT

## 2020-02-02 PROCEDURE — 86850 RBC ANTIBODY SCREEN: CPT | Performed by: PHYSICIAN ASSISTANT

## 2020-02-02 PROCEDURE — 86900 BLOOD TYPING SEROLOGIC ABO: CPT | Performed by: PHYSICIAN ASSISTANT

## 2020-02-02 PROCEDURE — 85018 HEMOGLOBIN: CPT | Performed by: PHYSICIAN ASSISTANT

## 2020-02-02 PROCEDURE — 82805 BLOOD GASES W/O2 SATURATION: CPT | Performed by: PHYSICIAN ASSISTANT

## 2020-02-02 PROCEDURE — 86920 COMPATIBILITY TEST SPIN: CPT

## 2020-02-02 PROCEDURE — 99233 SBSQ HOSP IP/OBS HIGH 50: CPT | Performed by: EMERGENCY MEDICINE

## 2020-02-02 RX ADMIN — VASOPRESSIN 0.04 UNITS/MIN: 20 INJECTION INTRAVENOUS at 17:14

## 2020-02-02 RX ADMIN — INSULIN LISPRO 1 UNITS: 100 INJECTION, SOLUTION INTRAVENOUS; SUBCUTANEOUS at 17:10

## 2020-02-02 RX ADMIN — IVABRADINE 5 MG: 5 TABLET, FILM COATED ORAL at 16:34

## 2020-02-02 RX ADMIN — MEXILETINE HYDROCHLORIDE 150 MG: 150 CAPSULE ORAL at 16:34

## 2020-02-02 RX ADMIN — NOREPINEPHRINE BITARTRATE 3 MCG/MIN: 1 INJECTION INTRAVENOUS at 21:26

## 2020-02-02 RX ADMIN — ONDANSETRON 4 MG: 2 INJECTION INTRAMUSCULAR; INTRAVENOUS at 11:15

## 2020-02-02 RX ADMIN — ASPIRIN 81 MG: 81 TABLET ORAL at 08:34

## 2020-02-02 RX ADMIN — GABAPENTIN 100 MG: 100 CAPSULE ORAL at 21:25

## 2020-02-02 RX ADMIN — LEVOTHYROXINE SODIUM 88 MCG: 88 TABLET ORAL at 05:00

## 2020-02-02 RX ADMIN — NOREPINEPHRINE BITARTRATE 8 MCG/MIN: 1 INJECTION INTRAVENOUS at 02:19

## 2020-02-02 RX ADMIN — IVABRADINE 5 MG: 5 TABLET, FILM COATED ORAL at 08:35

## 2020-02-02 RX ADMIN — HEPARIN SODIUM 5000 UNITS: 5000 INJECTION INTRAVENOUS; SUBCUTANEOUS at 21:25

## 2020-02-02 RX ADMIN — MEXILETINE HYDROCHLORIDE 150 MG: 150 CAPSULE ORAL at 20:24

## 2020-02-02 RX ADMIN — VASOPRESSIN 0.04 UNITS/MIN: 20 INJECTION INTRAVENOUS at 01:26

## 2020-02-02 RX ADMIN — MONTELUKAST SODIUM 10 MG: 10 TABLET, FILM COATED ORAL at 21:24

## 2020-02-02 RX ADMIN — ALBUMIN (HUMAN) 12.5 G: 0.25 INJECTION, SOLUTION INTRAVENOUS at 01:26

## 2020-02-02 RX ADMIN — INSULIN LISPRO 2 UNITS: 100 INJECTION, SOLUTION INTRAVENOUS; SUBCUTANEOUS at 13:19

## 2020-02-02 RX ADMIN — ALBUMIN (HUMAN) 12.5 G: 0.25 INJECTION, SOLUTION INTRAVENOUS at 13:22

## 2020-02-02 RX ADMIN — VASOPRESSIN 0.04 UNITS/MIN: 20 INJECTION INTRAVENOUS at 08:22

## 2020-02-02 RX ADMIN — HEPARIN SODIUM 5000 UNITS: 5000 INJECTION INTRAVENOUS; SUBCUTANEOUS at 13:22

## 2020-02-02 RX ADMIN — ALBUMIN (HUMAN) 12.5 G: 0.25 INJECTION, SOLUTION INTRAVENOUS at 08:33

## 2020-02-02 RX ADMIN — LIDOCAINE 1 PATCH: 50 PATCH TOPICAL at 08:34

## 2020-02-02 RX ADMIN — INSULIN LISPRO 1 UNITS: 100 INJECTION, SOLUTION INTRAVENOUS; SUBCUTANEOUS at 08:40

## 2020-02-02 RX ADMIN — INSULIN LISPRO 2 UNITS: 100 INJECTION, SOLUTION INTRAVENOUS; SUBCUTANEOUS at 21:26

## 2020-02-02 RX ADMIN — HEPARIN SODIUM 5000 UNITS: 5000 INJECTION INTRAVENOUS; SUBCUTANEOUS at 05:00

## 2020-02-02 RX ADMIN — POLYETHYLENE GLYCOL 3350 17 G: 17 POWDER, FOR SOLUTION ORAL at 08:34

## 2020-02-02 RX ADMIN — PANTOPRAZOLE SODIUM 40 MG: 40 TABLET, DELAYED RELEASE ORAL at 05:00

## 2020-02-02 RX ADMIN — ALBUMIN (HUMAN) 12.5 G: 0.25 INJECTION, SOLUTION INTRAVENOUS at 20:22

## 2020-02-02 RX ADMIN — OXYCODONE HYDROCHLORIDE 5 MG: 5 TABLET ORAL at 17:12

## 2020-02-02 RX ADMIN — MEXILETINE HYDROCHLORIDE 150 MG: 150 CAPSULE ORAL at 08:35

## 2020-02-02 NOTE — PROGRESS NOTES
NEPHROLOGY PROGRESS NOTE   Wendy Perkins 49 y o  female MRN: [id number]  Unit/Bed#: [facility] 516-01 Encounter: [id number]      ASSESSMENT & PLAN:  1  Acute kidney injury suspected multifactorial possible hepatorenal in the setting of widespread metastatic liver disease  Suspected prerenal component in the setting of hypotension and acute blood loss anemia  Patient transferred to the ICU and started on inotropics/pressor support, receive blood transfusion  Renal function improving creatinine down to 1 71  Monitor ins and outs  Continue with medical management per critical care team   Avoid hypotension  Consider albumin 1 g/kg intravenously for 24-48 hours  Overall poor prognosis    2  Hyponatremia in the setting of malignancy  Received 1 dose of Samsca 15 mg yesterday  Serum sodium better today  Monitor daily labs    3  Metastatic breast cancer with wide spread metastatic disease as well as bone disease  Oncology and palliative on board  4  Abnormal LFTs in the setting of significant metastatic liver disease  GI on board  My plan and recommendations were discussed with critical care team   Palliative team on board, continue with ongoing discussion regarding goals of care      SUBJECTIVE:  Patient seen and examined  Denies any chest pain or shortness of breath at the moment  Patient was transferred to intensive care unit yesterday evening due to worsening hypotension, started on pressors, getting blood transfusion during evaluation due to dropping hemoglobin        OBJECTIVE:  Current Weight: Weight - Scale: 76 8 kg (169 lb 5 oz)(with aqua K pad)  Vitals:    02/02/20 0900   BP:    Pulse: 82   Resp: (!) 31   Temp: 98 2 °F (36 8 °C)   SpO2: 96%       Intake/Output Summary (Last 24 hours) at 2/2/2020 0920  Last data filed at 2/2/2020 0600  Gross per 24 hour   Intake 3110 44 ml   Output 4250 ml   Net -1139 56 ml     General: conscious, cooperative, in not acute distress  Eyes: conjunctivae pale, anicteric sclerae  ENT: lips and mucous membranes moist  Neck: supple, no JVD  Chest: clear breath sounds bilateral, no crackles, ronchus or wheezings  CVS: distinct S1 & S2, normal rate, regular rhythm  Abdomen: soft, non-tender, non-distended, normoactive bowel sounds  Extremities: no significant edema of both legs  Skin: no rash  Neuro: awake, alert, oriented      Medications:    Current Facility-Administered Medications:     albumin human (FLEXBUMIN) 25 % injection 12 5 g, 12 5 g, Intravenous, Q6H, Katheran Kocher, PA-C, Last Rate: 0 mL/hr at 02/02/20 0200, 12 5 g at 02/02/20 2615    aspirin (ECOTRIN LOW STRENGTH) EC tablet 81 mg, 81 mg, Oral, Daily, Haris Goel PA-C, 81 mg at 02/02/20 0834    fentanyl citrate (PF) 100 MCG/2ML 25 mcg, 25 mcg, Intravenous, Q2H PRN, Haris Goel PA-C    gabapentin (NEURONTIN) capsule 100 mg, 100 mg, Oral, HS, Haris Goel PA-C, 100 mg at 02/01/20 2145    heparin (porcine) subcutaneous injection 5,000 Units, 5,000 Units, Subcutaneous, Q8H [address], Haris Goel PA-C, 5,000 Units at 02/02/20 0500    insulin lispro (HumaLOG) 100 units/mL subcutaneous injection 1-5 Units, 1-5 Units, Subcutaneous, 4x Daily (AC & HS), 1 Units at 02/02/20 0840 **AND** Fingerstick Glucose (POCT), , , 4x Daily AC and at bedtime, Haris Goel PA-C    ivabradine HCl (CORLANOR) tablet 5 mg, 5 mg, Oral, BID With Meals, Haris Goel PA-C, 5 mg at 02/02/20 0701    levothyroxine tablet 88 mcg, 88 mcg, Oral, Early Morning, Haris Goel PA-C, 88 mcg at 02/02/20 0500    lidocaine (LIDODERM) 5 % patch 1 patch, 1 patch, Topical, Daily, Haris Goel PA-C, 1 patch at 02/02/20 9158    mexiletine (MEXITIL) capsule 150 mg, 150 mg, Oral, TID, Haris Goel, PA-C, 150 mg at 02/02/20 0835    montelukast (SINGULAIR) tablet 10 mg, 10 mg, Oral, HS, Haris Goel PA-C, 10 mg at 02/01/20 8868    norepinephrine (LEVOPHED) 4 mg (STANDARD CONCENTRATION) IV in sodium chloride 0 9% 250 mL, 1-30 mcg/min, Intravenous, Titrated, Buzz Room, PA-C, Last Rate: 18 8 mL/hr at 02/02/20 0837, 5 mcg/min at 02/02/20 0837    ondansetron [facility]) injection 4 mg, 4 mg, Intravenous, Q4H PRN, [facility] Erika PA-C, 4 mg at 02/01/20 1540    oxyCODONE (ROXICODONE) IR tablet 5 mg, 5 mg, Oral, Q4H PRN, [facility] BUCKY James-C, 5 mg at 02/01/20 1942    pantoprazole (PROTONIX) EC tablet 40 mg, 40 mg, Oral, Early Morning, [facility] TEODORA James, 40 mg at 02/02/20 0500    polyethylene glycol (MIRALAX) packet 17 g, 17 g, Oral, Daily, [facility] TEODORA James, 17 g at 02/02/20 0956    vasopressin (PITRESSIN) 20 Units in sodium chloride 0 9 % 100 mL infusion, 0 04 Units/min, Intravenous, Continuous, Buzz Room, PA-C, Last Rate: 12 mL/hr at 02/02/20 0822, 0 04 Units/min at 02/02/20 0822    Invasive Devices:        Lab Results:   Results from last 7 days   Lab Units 02/02/20  0453 02/01/20  0545 01/31/20  0524   WBC Thousand/uL 18 50* 16 13* 12 50*   HEMOGLOBIN g/dL 6 8* 7 9* 8 0*   HEMATOCRIT % 20 3* 24 7* 25 2*   PLATELETS Thousands/uL 212 241 273   SODIUM mmol/L 130* 125* 126*   POTASSIUM mmol/L 4 7 5 2 4 7   CHLORIDE mmol/L 100 93* 93*   CO2 mmol/L 22 23 25   BUN mg/dL 47* 61* 44*   CREATININE mg/dL 1 71* 2 43* 1 84*   CALCIUM mg/dL 9 1 8 9 8 9   MAGNESIUM mg/dL  --   --  1 9   ALK PHOS U/L 1,094* 1,414* 1,248*   ALT U/L 200* 328* 417*   AST U/L 508* 1,100* 1,255*           Portions of the record may have been created with voice recognition software  Occasional wrong word or "sound a like" substitutions may have occurred due to the inherent limitations of voice recognition software  Read the chart carefully and recognize, using context, where substitutions have occurred  If you have any questions, please contact the dictating provider

## 2020-02-02 NOTE — CONSULTS
[address] [place] 49 y o  female MRN: [id number]  [facility] Encounter: [id number]      -------------------------------------------------------------------------------------------------------------  Chief Complaint: hypotension, weakness    History of Present Illness     Duke Estrada is a 49 y o  female with a h/o metastatic breast Ca (to liver and bone)  She was admitted to [facility] with SOB, hyponatremia, SACHI, elevated LFTs and a supra-therapeutic INR  During admission, she had studies including an Echo showing an EF of 35% (up from 25), an abdominal CT showing liver mets and no biliary obstruction, and a RUQ U/S showing GB sludge and no biliary obstruction  Coagulopathy was treated with Vitamin K  She was seen by Oncology, Nephrology, GI and Palliative Medicine  Seems the general consensus for her treatment is Hospice  Today, she developed hypotension to 70s and was treated with IVF boluses  Her BP did not adequately respond and CCM was consulted  Patient transferred to ICU where an urgent CVC and arterial line were placed  Resuscitation with a combination of Albumin and crystalloid solutions was transiently effective  A Norepinephrine infusion was then started for BP control  Bedside Echo showed slightly dilated and diffusely hypokinetic LV with an under-filled RV  I saw patient in ICU  She was fatigued and c/o chronic low back pain  History obtained from chart review and the patient   -------------------------------------------------------------------------------------------------------------  Assessment and Plan:    Neuro:    No issues  o Routine Neuro assessments      CV:    Dilated CM caused by Chemotherapy  o Fluid resuscitation  o Levophed / Vasopressin if needed to get MAPs to 65 range   VT hx with ICD / pacer     H/o clot on ICD lead  - ICD interrogated and found to be without events  - Holding AC due to elevated INR & LFTs  - Continue Mexiletine  Chronic Afib on Coumadin at home  o Hold [facility] for now and follow daily INR    Pulm:   No issues  - Watch for pulmonary edema with volume resuscitation       GI:    Liver mets with liver failure (Hepatorenal)  o GI following with grim prognosis  o Levophed / Vasopressin for BP support  o Considering Sandostatin  o Follow daily LFTs & INR   Nausea, anorexia  o Prn Zofran  o Advance diet as tolerated  o Daily Protonix  -Pneumobilia of unknown etiology    :    Worsening renal function    Hepatorenal   Hyponatremia with normal Cortisol    Volume depletion by Echo  o Volume resuscitation with Albumin / crystalloid  o Levophed / Vasopressin for BP support  o Considering Sandostatin  o Follow hourly uo and daily renal labs  o LR at 75    Follow sodium levels daily for now  o Tolvaptam x 1 dose    Nephrology following      F/E/N:    LR at 75, daily labs, Cardiac diet with fluid restriction      Heme/Onc:    Breast Ca with bone and liver mets  o Oncology following    Grim prognosis  o Palliative Care Consulted  o       Endo:    Hypothyroid   Type 2 DM  o Continue Synthroid (TSH 5 7)  o Insulin SS coverage      ID:    No signs of infectious process  o Trend temps and WBC  o F/u blood cultures already drawn      MSK/Skin:    Chronic pain from metastatic disease  o Palliative Medicine handling pain management    Disposition: Admit to Critical Care   Code Status: Level 1 - Full Code  --------------------------------------------------------------------------------------------------------------  Review of Systems   Constitutional: Positive for appetite change and fatigue  Respiratory: Positive for shortness of breath  Cardiovascular: Positive for leg swelling  Negative for chest pain  Gastrointestinal: Positive for nausea  Anorexia   Genitourinary:        Patient could not void this evening despite a full bladder (800cc)   Musculoskeletal: Positive for arthralgias and back pain     Neurological: Positive for weakness  Psychiatric/Behavioral: Negative  Physical Exam   Constitutional: She is oriented to person, place, and time  She appears well-developed and well-nourished  No distress  Eyes: Pupils are equal, round, and reactive to light  Cardiovascular: Normal rate  Sinus rhythm with V-pacing   Pulmonary/Chest: Effort normal and breath sounds normal    Abdominal: Soft  Bowel sounds are normal  She exhibits no distension  There is no tenderness  Musculoskeletal:   Trace BLE edema  Extremity strength 4/5 throughout   Neurological: She is alert and oriented to person, place, and time  No cranial nerve deficit  Skin: Skin is warm and dry  Psychiatric: She has a normal mood and affect      --------------------------------------------------------------------------------------------------------------  Vitals:   Vitals:    02/01/20 0929 02/01/20 1535 02/01/20 1835 02/01/20 2300   BP:  (!) 66/37     BP Location:       Pulse: 80 84 86    Resp:  18 (!) 24    Temp:  98 °F (36 7 °C)  98 5 °F (36 9 °C)   TempSrc:  Oral  Oral   SpO2:  98% 96%    Weight:       Height:         Temp  Min: 97 7 °F (36 5 °C)  Max: 100 6 °F (38 1 °C)  IBW: 63 9 kg  Height: 5' 8" (172 7 cm)  Body mass index is 24 13 kg/m²    N/A    Laboratory and Diagnostics:  Results from last 7 days   Lab Units 02/01/20  0545 01/31/20  0524 01/29/20  1150   WBC Thousand/uL 16 13* 12 50* 22 09*   HEMOGLOBIN g/dL 7 9* 8 0* 10 2*   HEMATOCRIT % 24 7* 25 2* 30 8*   PLATELETS Thousands/uL 241 273 328   BANDS PCT %  --  2 2   MONO PCT % 6 7 10     Results from last 7 days   Lab Units 02/01/20  0545 01/31/20  0524 01/30/20  0444 01/29/20  1150   SODIUM mmol/L 125* 126* 128* 132*   POTASSIUM mmol/L 5 2 4 7 4 3 5 1   CHLORIDE mmol/L 93* 93* 95* 99*   CO2 mmol/L 23 25 26 26   ANION GAP mmol/L 9 8 7 7   BUN mg/dL 61* 44* 26* 25   CREATININE mg/dL 2 43* 1 84* 0 89 1 11   CALCIUM mg/dL 8 9 8 9 9 3 9 9   GLUCOSE RANDOM mg/dL 92 177* 136 173*   ALT U/L 328* 417* 174* 205*   AST U/L 1,100* 1,255* 469* 519*   ALK PHOS U/L 1,414* 1,248* 995* 1,165*   ALBUMIN g/dL 1 8* 1 9* 1 9* 2 2*   TOTAL BILIRUBIN mg/dL 5 02* 4 55* 5 00* 4 11*     Results from last 7 days   Lab Units 01/31/20  0524   MAGNESIUM mg/dL 1 9      Results from last 7 days   Lab Units 01/31/20  0524 01/30/20  0444 01/29/20  2125 01/29/20  1154   INR  1 73* 1 54* 2 16* >15 00*   PTT seconds  --   --   --  89*      Results from last 7 days   Lab Units 01/29/20  1150   TROPONIN I ng/mL <0 02         ABG:    VBG:          Micro:  Results from last 7 days   Lab Units 02/01/20  0546   BLOOD CULTURE  Received in Microbiology Lab  Culture in Progress  Received in Microbiology Lab  Culture in Progress  EKG: sinus rhythm with V-pacing  Imaging: I have personally reviewed pertinent reports        Historical Information   Past Medical History:   Diagnosis Date    Breast cancer metastasized to bone, unspecified laterality ([facility] [address] [facility] )     Cancer ([facility] [address] )     breast 2005, mets to bones 2015    Cardiac disease     Chemotherapy follow-up examination     CHF (congestive heart failure) (HCC)     Diabetes mellitus ([facility] [facility]     Disease of thyroid gland     Hyperlipidemia      Past Surgical History:   Procedure Laterality Date    BREAST SURGERY      CARDIAC PACEMAKER PLACEMENT      HYSTERECTOMY       Social History   Social History     Substance and Sexual Activity   Alcohol Use Never    Frequency: Never    Comment: socially     Social History     Substance and Sexual Activity   Drug Use Not Currently     Social History     Tobacco Use   Smoking Status Never Smoker   Smokeless Tobacco Never Used     Exercise History:   Family History:   Family History   Problem Relation Age of Onset    Cancer Mother     Heart disease Father     Diabetes Brother      I have reviewed this patient's family history and commented on sigificant items within the HPI      Medications:  Current Facility-Administered Medications Medication Dose Route Frequency    albumin human (FLEXBUMIN) 25 % injection 12 5 g  12 5 g Intravenous Q6H    aspirin (ECOTRIN LOW STRENGTH) EC tablet 81 mg  81 mg Oral Daily    fentanyl citrate (PF) 100 MCG/2ML 25 mcg  25 mcg Intravenous Q2H PRN    gabapentin (NEURONTIN) capsule 100 mg  100 mg Oral HS    heparin (porcine) subcutaneous injection 5,000 Units  5,000 Units Subcutaneous Q8H [facility]    insulin lispro (HumaLOG) 100 units/mL subcutaneous injection 1-5 Units  1-5 Units Subcutaneous 4x Daily (AC & HS)    ivabradine HCl (CORLANOR) tablet 5 mg  5 mg Oral BID With Meals    lactated ringers infusion  75 mL/hr Intravenous Continuous    levothyroxine tablet 88 mcg  88 mcg Oral Early Morning    lidocaine (LIDODERM) 5 % patch 1 patch  1 patch Topical Daily    mexiletine (MEXITIL) capsule 150 mg  150 mg Oral TID    montelukast (SINGULAIR) tablet 10 mg  10 mg Oral HS    norepinephrine (LEVOPHED) 4 mg (STANDARD CONCENTRATION) IV in sodium chloride 0 9% 250 mL  1-30 mcg/min Intravenous Titrated    ondansetron (ZOFRAN) injection 4 mg  4 mg Intravenous Q4H PRN    oxyCODONE (ROXICODONE) IR tablet 5 mg  5 mg Oral Q4H PRN    pantoprazole (PROTONIX) EC tablet 40 mg  40 mg Oral Early Morning    polyethylene glycol (MIRALAX) packet 17 g  17 g Oral Daily     Home medications:  Prior to Admission Medications   Prescriptions Last Dose Informant Patient Reported? Taking?    DULoxetine (CYMBALTA) 30 mg delayed release capsule   Yes Yes   Sig: Take 30 mg by mouth daily   aspirin (ECOTRIN LOW STRENGTH) 81 mg EC tablet   Yes Yes   Sig: Take 81 mg by mouth daily   carvedilol (COREG) 6 25 mg tablet   Yes Yes   Sig: Take 6 25 mg by mouth 2 (two) times a day with meals   cephalexin (KEFLEX) 500 mg capsule   Yes Yes   Sig: Take 500 mg by mouth every 6 (six) hours   cholecalciferol (VITAMIN D3) 1,000 units tablet   Yes Yes   Sig: Take 5,000 Units by mouth daily   dexlansoprazole (DEXILANT) 60 MG capsule   Yes Yes   Sig: Take 60 mg by mouth daily   digoxin (LANOXIN) 0 25 mg tablet   Yes Yes   Sig: Take 125 mcg by mouth daily   ivabradine HCl (CORLANOR) 5 MG tablet   Yes Yes   Sig: Take 5 mg by mouth 2 (two) times a day   levothyroxine 75 mcg tablet   Yes Yes   Sig: Take 88 mcg by mouth daily   losartan (COZAAR) 25 mg tablet   Yes Yes   Sig: Take 25 mg by mouth daily   metFORMIN (GLUCOPHAGE) 500 mg tablet   Yes Yes   Sig: Take 500 mg by mouth 2 (two) times a day with meals   methylprednisolone (MEDROL) 4 mg tablet   Yes Yes   Sig: Take 4 mg by mouth 2 (two) times a day Take as directed daily with food   mexiletine (MEXITIL) 150 mg capsule   Yes Yes   Sig: Take 150 mg by mouth 3 (three) times a day   montelukast (SINGULAIR) 10 mg tablet   Yes Yes   Sig: Take 10 mg by mouth daily at bedtime   oxyCODONE (OXY-IR) 5 MG capsule   Yes Yes   Sig: Take 5 mg by mouth every 4 (four) hours as needed for moderate pain Take 1-2 tabs every 4hrs PRN bone pain   polyethylene glycol (MIRALAX) 17 g packet   Yes Yes   Sig: Take 17 g by mouth daily   potassium chloride (K-DUR,KLOR-CON) 10 mEq tablet   Yes Yes   Sig: Take 20 mEq by mouth daily   rosuvastatin (CRESTOR) 5 mg tablet   Yes Yes   Sig: Take 5 mg by mouth daily   spironolactone (ALDACTONE) 50 mg tablet   Yes Yes   Sig: Take 50 mg by mouth daily   torsemide (DEMADEX) 20 mg tablet   Yes Yes   Sig: Take 20 mg by mouth daily as needed (weight gain > 3lbs)   warfarin (COUMADIN) 5 mg tablet   Yes Yes   Sig: Take by mouth daily      Facility-Administered Medications: None     Allergies:   Allergies   Allergen Reactions    Ambien [Zolpidem]     Entresto [Sacubitril-Valsartan] Other (See Comments)     Hypotension     Other      Other reaction(s): Unknown  Adhesive Tape    Bee Pollen Rash     Pollen     ------------------------------------------------------------------------------------------------------------  Advance Directive and Living Will:      Power of :    POLST: ------------------------------------------------------------------------------------------------------------  Anticipated Length of Stay is > 2 midnights    Counseling / Coordination of Care  Total time spent today 35 minutes  Greater than 50% of total time was spent with the patient and / or family counseling and / or coordination of care  A description of the counseling / coordination of care: critical care time      Nona Rodriguez PA-C        Portions of the record may have been created with voice recognition software  Occasional wrong word or "sound a like" substitutions may have occurred due to the inherent limitations of voice recognition software    Read the chart carefully and recognize, using context, where substitutions have occurred

## 2020-02-02 NOTE — PLAN OF CARE
Problem: Potential for Falls  Goal: Patient will remain free of falls  Description  INTERVENTIONS:  - Assess patient frequently for physical needs  -  Identify cognitive and physical deficits and behaviors that affect risk of falls    -  [place] fall precautions as indicated by assessment   - Educate patient/family on patient safety including physical limitations  - Instruct patient to call for assistance with activity based on assessment  - Modify environment to reduce risk of injury  - Consider OT/PT consult to assist with strengthening/mobility  Outcome: Progressing     Problem: PAIN - ADULT  Goal: Verbalizes/displays adequate comfort level or baseline comfort level  Description  Interventions:  - Encourage patient to monitor pain and request assistance  - Assess pain using appropriate pain scale  - Administer analgesics based on type and severity of pain and evaluate response  - Implement non-pharmacological measures as appropriate and evaluate response  - Consider cultural and social influences on pain and pain management  - Notify physician/advanced practitioner if interventions unsuccessful or patient reports new pain  Outcome: Progressing     Problem: INFECTION - ADULT  Goal: Absence or prevention of progression during hospitalization  Description  INTERVENTIONS:  - Assess and monitor for signs and symptoms of infection  - Monitor lab/diagnostic results  - Monitor all insertion sites, i e  indwelling lines, tubes, and drains  - Monitor endotracheal if appropriate and nasal secretions for changes in amount and color  - [place] appropriate cooling/warming therapies per order  - Administer medications as ordered  - Instruct and encourage patient and family to use good hand hygiene technique  - Identify and instruct in appropriate isolation precautions for identified infection/condition  Outcome: Progressing     Problem: SAFETY ADULT  Goal: Patient will remain free of falls  Description  INTERVENTIONS:  - Assess patient frequently for physical needs  -  Identify cognitive and physical deficits and behaviors that affect risk of falls    -  [place] fall precautions as indicated by assessment   - Educate patient/family on patient safety including physical limitations  - Instruct patient to call for assistance with activity based on assessment  - Modify environment to reduce risk of injury  - Consider OT/PT consult to assist with strengthening/mobility  Outcome: Progressing     Problem: RESPIRATORY - ADULT  Goal: Achieves optimal ventilation and oxygenation  Description  INTERVENTIONS:  - Assess for changes in respiratory status  - Assess for changes in mentation and behavior  - Position to facilitate oxygenation and minimize respiratory effort  - Oxygen administered by appropriate delivery if ordered  - Initiate smoking cessation education as indicated  - Encourage broncho-pulmonary hygiene including cough, deep breathe, Incentive Spirometry  - Assess the need for suctioning and aspirate as needed  - Assess and instruct to report SOB or any respiratory difficulty  - Respiratory Therapy support as indicated  Outcome: Progressing     Problem: SKIN/TISSUE INTEGRITY - ADULT  Goal: Skin integrity remains intact  Description  INTERVENTIONS  - Identify patients at risk for skin breakdown  - Assess and monitor skin integrity  - Assess and monitor nutrition and hydration status  - Monitor labs (i e  albumin)  - Assess for incontinence   - Turn and reposition patient  - Assist with mobility/ambulation  - Relieve pressure over bony prominences  - Avoid friction and shearing  - Provide appropriate hygiene as needed including keeping skin clean and dry  - Evaluate need for skin moisturizer/barrier cream  - Collaborate with interdisciplinary team (i e  Nutrition, Rehabilitation, etc )   - Patient/family teaching  Outcome: Progressing     Problem: HEMATOLOGIC - ADULT  Goal: Maintains hematologic stability  Description  INTERVENTIONS  - Assess for signs and symptoms of bleeding or hemorrhage  - Monitor labs  - Administer supportive blood products/factors as ordered and appropriate  Outcome: Progressing     Problem: METABOLIC, FLUID AND ELECTROLYTES - ADULT  Goal: Electrolytes maintained within normal limits  Description  INTERVENTIONS:  - Monitor labs and assess patient for signs and symptoms of electrolyte imbalances  - Administer electrolyte replacement as ordered  - Monitor response to electrolyte replacements, including repeat lab results as appropriate  - Instruct patient on fluid and nutrition as appropriate  Outcome: Progressing  Goal: Fluid balance maintained  Description  INTERVENTIONS:  - Monitor labs   - Monitor I/O and WT  - Instruct patient on fluid and nutrition as appropriate  - Assess for signs & symptoms of volume excess or deficit  Outcome: Progressing  Goal: Glucose maintained within target range  Description  INTERVENTIONS:  - Monitor Blood Glucose as ordered  - Assess for signs and symptoms of hyperglycemia and hypoglycemia  - Administer ordered medications to maintain glucose within target range  - Assess nutritional intake and initiate nutrition service referral as needed  Outcome: Progressing

## 2020-02-02 NOTE — PROGRESS NOTES
Daily Progress Note - Critical Care   Berhane Hussein 49 y o  female MRN: [id number]  Unit/Bed#: [facility] 516-01 Encounter: [id number]        ----------------------------------------------------------------------------------------  HPI/24hr events: Transferred to the ICU secondary to hypotension, need for pressors  Remains on levophed and vasopressin  Hemoglobin 6 8 this morning, receiving 1 unit prbcs  ---------------------------------------------------------------------------------------  SUBJECTIVE  "I just got comfortable" Denies any pain at this time  Slept last night  Review of Systems  Review of systems was reviewed and negative unless stated above in HPI/24-hour events   ---------------------------------------------------------------------------------------  Assessment and Plan:    Neuro:    Pain control:  o Plan: Gabapentin 100 qhs, lidoderm patch      CV:    Diagnosis: Hypotension, hypovolemia vs hepatorenal  o Plan: Remains on levophed 5 and vasopressin 0 04  Volume resuscitated last evening, cautious with further fluid given cardiomyopathy   Diagnosis: Acute on chronic systolic heart failure, EF 35% - chemo induced  o Plan: Monitor   Diagnosis: VTach s/p ICD placement  o Plan: Continue home mexiletine   Diagnosis: Chronic AFib  o Plan: ASA      Pulm:   Plan:   o Encourage IS, pulmonary toilet      GI:    Diagnosis: Transaminitis secondary to metastases   o Plan: Trend daily  Poor prognosis      :    Diagnosis: SACHI - suspect hepatorenal   o Plan: Samsca given yesterday per nephrology, appreciate further recommendations      Diagnosis: Hyponatremia  o Plan: Improved s/p samsca      F/E/N:    Plan:   o No continuous maintenance fluids  o Replete electrolytes as needed for goal Mag >2 0, Phos >3 0, K >4 0  o Cardiac diet      Heme/Onc:    Diagnosis: 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, coumadin use  o Plan: Trend INR, no further reversal indicated as no active bleeding   Diagnosis: Breast cancer with metastatic disease to liver and bone  o Plan: Goals of care discussion, patient and brothers at bedside expressing interest in hospice and further discussion with Dr Key Chavira tomorrow  Wishes to remain a full code in the interim until she makes decisions  Endo:    Diagnosis: DM2  o Plan: SSI algorithm 2      ID:    Plan:   o Blood cultures pending, less likely infectious cause of hypotension  MSK/Skin:    Plan:   o Turns and repositioning, offloading    Disposition: Continue Critical Care   Code Status: Level 1 - Full Code  ---------------------------------------------------------------------------------------  ICU CORE MEASURES    Prophylaxis   VTE Pharmacologic Prophylaxis: Heparin  VTE Mechanical Prophylaxis: sequential compression device  Stress Ulcer Prophylaxis: Pantoprazole PO    ABCDE Protocol (if indicated)  Plan to perform spontaneous awakening trial today? Not applicable  Plan to perform spontaneous breathing trial today? Not applicable  Obvious barriers to extubation? Not applicable  CAM-ICU: Negative    Invasive Devices Review  Invasive Devices     Central Venous Catheter Line            CVC Central Lines 20 Triple Right Internal jugular less than 1 day          Arterial Line            Arterial Line 20 Left Radial less than 1 day          Drain            Urethral Catheter Temperature probe 14 Fr  less than 1 day              Can any invasive devices be discontinued today?  Not applicable  ---------------------------------------------------------------------------------------  OBJECTIVE    Vitals   Vitals:    20 1100 20 1200 20 1205 20 1230   BP:  (!) 99/31     BP Location:       Pulse: 74 74 74 74   Resp: (!) 23 (!) 23 16 (!) 31   Temp:       TempSrc:       SpO2: 95% 96% 97% 96%   Weight:       Height:         Temp (24hrs), Av 2 °F (36 8 °C), Min:97 8 °F (36 6 °C), Max:98 7 °F (37 1 °C)  Current: Temperature: 98 1 °F (36 7 °C)      Physical Exam   Constitutional: She is oriented to person, place, and time  Vital signs are normal  No distress  HENT:   Head: Normocephalic and atraumatic  Eyes: Pupils are equal, round, and reactive to light  Scleral icterus is present  Neck:   R IJ CVC in place   Cardiovascular: Normal heart sounds  Exam reveals no gallop, no distant heart sounds and no friction rub  No murmur heard  Pulses:       Radial pulses are 2+ on the right side, and 2+ on the left side  Dorsalis pedis pulses are 2+ on the right side, and 2+ on the left side  AV paced   Pulmonary/Chest: Effort normal and breath sounds normal  She has no decreased breath sounds  She has no wheezes  She has no rhonchi  She has no rales  Abdominal: Soft  Normal appearance  She exhibits distension  There is no tenderness  Neurological: She is alert and oriented to person, place, and time  GCS eye subscore is 4  GCS verbal subscore is 5  GCS motor subscore is 6  Skin: Skin is warm, dry and intact  She is not diaphoretic     Jaundice     Respiratory:  SpO2: SpO2: 96 %       Invasive/non-invasive ventilation settings   Respiratory    Lab Data (Last 4 hours)    None         O2/Vent Data (Last 4 hours)    None                Laboratory and Diagnostics:  Results from last 7 days   Lab Units 02/02/20  1122 02/02/20  0453 02/01/20  0545 01/31/20  0524 01/29/20  1150   WBC Thousand/uL  --  18 50* 16 13* 12 50* 22 09*   HEMOGLOBIN g/dL 7 9* 6 8* 7 9* 8 0* 10 2*   HEMATOCRIT %  --  20 3* 24 7* 25 2* 30 8*   PLATELETS Thousands/uL  --  212 241 273 328   BANDS PCT %  --  1  --  2 2   MONO PCT %  --  4 6 7 10     Results from last 7 days   Lab Units 02/02/20  0453 02/01/20  0545 01/31/20  0524 01/30/20  0444 01/29/20  1150   SODIUM mmol/L 130* 125* 126* 128* 132*   POTASSIUM mmol/L 4 7 5 2 4 7 4 3 5 1   CHLORIDE mmol/L 100 93* 93* 95* 99*   CO2 mmol/L 22 23 25 26 26   ANION GAP mmol/L 8 9 8 7 7   BUN mg/dL 47* 61* 44* 26* 25   CREATININE mg/dL 1 71* 2 43* 1 84* 0 89 1 11   CALCIUM mg/dL 9 1 8 9 8 9 9 3 9 9   GLUCOSE RANDOM mg/dL 138 92 177* 136 173*   ALT U/L 200* 328* 417* 174* 205*   AST U/L 508* 1,100* 1,255* 469* 519*   ALK PHOS U/L 1,094* 1,414* 1,248* 995* 1,165*   ALBUMIN g/dL 2 3* 1 8* 1 9* 1 9* 2 2*   TOTAL BILIRUBIN mg/dL 6 03* 5 02* 4 55* 5 00* 4 11*     Results from last 7 days   Lab Units 01/31/20  0524   MAGNESIUM mg/dL 1 9      Results from last 7 days   Lab Units 02/02/20  0453 01/31/20  0524 01/30/20  0444 01/29/20  2125 01/29/20  1154   INR  2 51* 1 73* 1 54* 2 16* >15 00*   PTT seconds  --   --   --   --  89*      Results from last 7 days   Lab Units 01/29/20  1150   TROPONIN I ng/mL <0 02         ABG:    VBG:  Results from last 7 days   Lab Units 02/02/20  0622   PH DARIUS  7 374   PCO2 DARIUS mm Hg 36 3*   PO2 DARIUS mm Hg 33 8*   HCO3 DARIUS mmol/L 20 7*   BASE EXC DARIUS mmol/L -4 1           Micro  Results from last 7 days   Lab Units 02/01/20  0546   BLOOD CULTURE  No Growth at 24 hrs  No Growth at 24 hrs  Imaging: No new imaging I have personally reviewed pertinent reports  Intake and Output  I/O       01/31 0701 - 02/01 0700 02/01 0701 - 02/02 0700 02/02 0701 - 02/03 0700    P  O  720 420     I V  (mL/kg) 695 (9 7) 366 1 (4 8)     IV Piggyback  2100     Total Intake(mL/kg) 1415 (19 7) 2886 1 (37 6)     Urine (mL/kg/hr) 900 (0 5) 3200 (1 7)     Total Output 900 3200     Net +515 -313 9                Height and Weights   Height: 5' 8" (172 7 cm)  IBW: 63 9 kg  Body mass index is 25 74 kg/m²  Weight (last 2 days)     Date/Time   Weight    02/02/20 0600   76 8 (169 31) with aqua K pad    Weight: with aqua K pad at 02/02/20 0600                Nutrition       Diet Orders   (From admission, onward)             Start     Ordered    02/01/20 1913  Diet Cardiovascular; Sodium 2 GM;  Fluid Restriction 1500 ML  Diet effective now     Question Answer Comment   Diet Type Cardiovascular    Cardiac Sodium 2 GM    Other Restriction(s): Fluid Restriction 1500 ML    RD to adjust diet per protocol?  Yes        02/01/20 1921              Active Medications  Scheduled Meds:    Current Facility-Administered Medications:  albumin human 12 5 g Intravenous Q6H Waldemar Riedel, PA-C Last Rate: 0 g (02/02/20 1130)   aspirin 81 mg Oral Daily [facility], [place]    fentanyl citrate (PF) 25 mcg Intravenous Q2H PRN [employer], [place]    gabapentin 100 mg Oral HS [facility], [place]    heparin (porcine) 5,000 Units Subcutaneous [facility], [place]    insulin lispro 1-5 Units Subcutaneous 4x Daily (AC & HS) [facility], [place]    ivabradine HCl 5 mg Oral BID With Meals [facility], [place]    levothyroxine 88 mcg Oral Early Morning [facility], [place]    lidocaine 1 patch Topical Daily [facility], [place]    mexiletine 150 mg Oral TID [facility], [place]    montelukast 10 mg Oral HS [facility], [place]    norepinephrine 1-30 mcg/min Intravenous Titrated Waldemar Riedel, PA-C Last Rate: Stopped (02/02/20 1130)   ondansetron 4 mg Intravenous Q4H PRN [employer], [place]    oxyCODONE 5 mg Oral Q4H PRN [employer], [place]    pantoprazole 40 mg Oral Early Morning [facility], [place]    polyethylene glycol 17 g Oral Daily [facility], [place]    vasopressin (PITRESSIN) in 0 9 % sodium chloride 100 mL 0 04 Units/min Intravenous Continuous Waldemar Riedel, PA-C Last Rate: 0 04 Units/min (02/02/20 1205)     Continuous Infusions:    norepinephrine 1-30 mcg/min Last Rate: Stopped (02/02/20 1130)   vasopressin (PITRESSIN) in 0 9 % sodium chloride 100 mL 0 04 Units/min Last Rate: 0 04 Units/min (02/02/20 1205)     PRN Meds:     fentanyl citrate (PF) 25 mcg Q2H PRN   ondansetron 4 mg Q4H PRN   oxyCODONE 5 mg Q4H PRN       Allergies   Allergies   Allergen Reactions    Ambien [Zolpidem]     Entresto [Sacubitril-Valsartan] Other (See Comments)     Hypotension     Other      Other reaction(s): Unknown  Adhesive Tape    Bee Pollen Rash     Pollen     ---------------------------------------------------------------------------------------  Advance Directive and Living Will:      Power of Attorney:    POLST:    ---------------------------------------------------------------------------------------  Counseling / Coordination of Care  Total Critical Care time spent 34 minutes excluding procedures, teaching and family updates        Daniel Thibodeaux PA-C

## 2020-02-02 NOTE — PROCEDURES
Central Line Insertion  Date/Time: 2/1/2020 6:05 PM  Performed by: Monet Brasher DO  Authorized by: Monet Brasher DO     Patient location:  Bedside  Consent:     Consent obtained:  Written    Consent given by:  Patient    Risks discussed:  Incorrect placement, arterial puncture, bleeding, infection and pneumothorax    Alternatives discussed:  No treatment  Universal protocol:     Procedure explained and questions answered to patient or proxy's satisfaction: yes      Immediately prior to procedure, a time out was called: yes      Patient identity confirmed:  Verbally with patient and arm band  Pre-procedure details:     Hand hygiene: Hand hygiene performed prior to insertion      Sterile barrier technique: All elements of maximal sterile technique followed      Skin preparation:  2% chlorhexidine  Indications:     Central line indications: no peripheral vascular access    Anesthesia (see MAR for exact dosages): Anesthesia method:  Local infiltration    Local anesthetic:  Lidocaine 1% w/o epi  Procedure details:     Location:  Right internal jugular    Vessel type: vein      Laterality:  Right    Approach: percutaneous technique used      Patient position:  Flat    Catheter type:  Triple lumen 16cm    Landmarks identified: yes      Ultrasound guidance: yes      Sterile ultrasound techniques: Sterile gel and sterile probe covers were used      Number of attempts:  1    Successful placement: yes    Post-procedure details:     Post-procedure:  Dressing applied and line sutured    Assessment:  Blood return through all ports and no pneumothorax on x-ray    Post-procedure complications: none      Patient tolerance of procedure:   Tolerated well, no immediate complications  Comments:      Procedure does not include critical care time

## 2020-02-02 NOTE — PROGRESS NOTES
Critical care medicine as to evaluate patient's secondary to hypotension  Patient was reported to have low blood pressures throughout the day  There was an attempt to administer IV fluid bolus for which IV infiltrated  Critical Care Medicine asked for patient evaluation as well IV access  Patient was admitted on 01/29/2020 secondary to shortness of breath  Patient is 60-year-old female with the past medical history of breast cancer with metastatic disease, atrial fibrillation, chronic systolic heart failure who presented with shortness of breath  In the past patient has had shortness of breath related to ventricular tachycardia  Patient called EMS was found to have a 5 beat run of V-tach  On admission patient is also found to be very coagulopathic with an INR greater than 15  She had not changed her Coumadin dose  Hypotension-multifactorial  Acute on chronic systolic heart failure with ejection fraction of 35%  Transaminitis secondary to hepatic metastatic infiltration  Hepatic renal syndrome  Acute kidney injury  Hyponatremia-multifactorial with congestive heart failure, hepatic failure and cancer likely SIADH  Ventricular tachycardia with ICD placement  Drug-induced cardiomyopathy  Chronic atrial fibrillation  Anemia  Coagulopathy secondary to Coumadin and hepatic metastatic disease  Diabetes mellitus type 2  Severe protein calorie malnutrition  Breast cancer with metastatic disease to liver into the bone      Patient was noted to have a blood pressure of 80 which appear to be asymptomatic for her throughout the day  Patient had a decline in her blood pressure to 60 systolic for which attempt was made to administer IV fluids  Unfortunately patient's IV infiltrated in patient remained hypotensive in need of resuscitation  Critical care medicine as to evaluate patient  Due to patient's hemodynamic compromise and need for central venous access she was transferred to the ICU      Plan to administer 500 mL of albumin  Point of care ultrasound displayed dilated left ventricle with poor ejection fraction, right ventricle did not appear over dilated, IVC collapse with respiration indicating patient may be intravascularly volume depleted  With patient's worsening liver failure and worsening renal failure concern of better renal syndrome  Patient may benefit from Sandostatin  Reassess patient after albumin for initiation of octreotide  Patient may require vasopressor medications to maintain mean arterial pressure greater than 60  Plan for initiation of Levophed and possibly vasopressin if needed for hemodynamic support  Consider checking SvO2 as patient may benefit from manage stroke with poor LV function  Continue to trend endpoints resuscitation  If hemoglobin declines further may benefit from transfusion of packed red blood cells  Since patient has been in critical care she has not had any tachy or wide complex arrhythmias  Continue to monitor on telemetry  Patient does have ICD which is due to be interrogated for evaluation of underlying arrhythmia  Patient does have subjective dyspnea  She is maintaining appropriate oxygen saturation  Continue to titrate oxygen to maintain saturation greater than 92%  Patient's dyspnea is likely multifactorial possibly related to cardiac etiology as well as increasing abdominal pressure enlarged liver due to metastatic infiltration  Patient's chest x-ray in echocardiogram findings are not consistent with intravascular volume overload of a patient is a risk for this with volume administration  Continue to reassess patient is status while resuscitating with albumin  Recheck patient's laboratory dated to evaluate for metabolic improvement  Replete electrolytes as needed  Continue to trend urine output  Patient does have significant back pain  Continue with pain medications as needed    If patient's pain is not appropriately controlled consider acute pain care service consultation  Patient does not have a clear indication for infection  Continue to trend patient's WBC count as well as monitor for signs of infection  No antibiotics have been initiated    For    Cc time 45min

## 2020-02-03 LAB
ABO GROUP BLD BPU: NORMAL
ALBUMIN SERPL BCP-MCNC: 2.4 G/DL (ref 3.5–5)
ALP SERPL-CCNC: 1010 U/L (ref 46–116)
ALT SERPL W P-5'-P-CCNC: 131 U/L (ref 12–78)
ANION GAP SERPL CALCULATED.3IONS-SCNC: 8 MMOL/L (ref 4–13)
AST SERPL W P-5'-P-CCNC: 224 U/L (ref 5–45)
BASOPHILS # BLD AUTO: 0.01 THOUSANDS/ΜL (ref 0–0.1)
BASOPHILS NFR BLD AUTO: 0 % (ref 0–1)
BILIRUB DIRECT SERPL-MCNC: 5.3 MG/DL (ref 0–0.2)
BILIRUB SERPL-MCNC: 6.95 MG/DL (ref 0.2–1)
BPU ID: NORMAL
BUN SERPL-MCNC: 23 MG/DL (ref 5–25)
CA-I BLD-SCNC: 1.16 MMOL/L (ref 1.12–1.32)
CALCIUM SERPL-MCNC: 9.6 MG/DL (ref 8.3–10.1)
CHLORIDE SERPL-SCNC: 103 MMOL/L (ref 100–108)
CO2 SERPL-SCNC: 23 MMOL/L (ref 21–32)
CREAT SERPL-MCNC: 1.04 MG/DL (ref 0.6–1.3)
CROSSMATCH: NORMAL
EOSINOPHIL # BLD AUTO: 0.07 THOUSAND/ΜL (ref 0–0.61)
EOSINOPHIL NFR BLD AUTO: 0 % (ref 0–6)
ERYTHROCYTE [DISTWIDTH] IN BLOOD BY AUTOMATED COUNT: 18.7 % (ref 11.6–15.1)
GFR SERPL CREATININE-BSD FRML MDRD: 61 ML/MIN/1.73SQ M
GLUCOSE SERPL-MCNC: 127 MG/DL (ref 65–140)
GLUCOSE SERPL-MCNC: 134 MG/DL (ref 65–140)
GLUCOSE SERPL-MCNC: 146 MG/DL (ref 65–140)
GLUCOSE SERPL-MCNC: 225 MG/DL (ref 65–140)
HCT VFR BLD AUTO: 23.6 % (ref 34.8–46.1)
HGB BLD-MCNC: 7.8 G/DL (ref 11.5–15.4)
IMM GRANULOCYTES # BLD AUTO: 0.38 THOUSAND/UL (ref 0–0.2)
IMM GRANULOCYTES NFR BLD AUTO: 2 % (ref 0–2)
INR PPP: 1.79 (ref 0.84–1.19)
LYMPHOCYTES # BLD AUTO: 1.06 THOUSANDS/ΜL (ref 0.6–4.47)
LYMPHOCYTES NFR BLD AUTO: 7 % (ref 14–44)
MAGNESIUM SERPL-MCNC: 1.6 MG/DL (ref 1.6–2.6)
MCH RBC QN AUTO: 32.2 PG (ref 26.8–34.3)
MCHC RBC AUTO-ENTMCNC: 33.1 G/DL (ref 31.4–37.4)
MCV RBC AUTO: 98 FL (ref 82–98)
MONOCYTES # BLD AUTO: 1.55 THOUSAND/ΜL (ref 0.17–1.22)
MONOCYTES NFR BLD AUTO: 10 % (ref 4–12)
NEUTROPHILS # BLD AUTO: 12.65 THOUSANDS/ΜL (ref 1.85–7.62)
NEUTS SEG NFR BLD AUTO: 81 % (ref 43–75)
NRBC BLD AUTO-RTO: 1 /100 WBCS
PHOSPHATE SERPL-MCNC: 2.2 MG/DL (ref 2.7–4.5)
PLATELET # BLD AUTO: 205 THOUSANDS/UL (ref 149–390)
PMV BLD AUTO: 10.7 FL (ref 8.9–12.7)
POTASSIUM SERPL-SCNC: 3.9 MMOL/L (ref 3.5–5.3)
PROT SERPL-MCNC: 6.1 G/DL (ref 6.4–8.2)
PROTHROMBIN TIME: 20.3 SECONDS (ref 11.6–14.5)
RBC # BLD AUTO: 2.42 MILLION/UL (ref 3.81–5.12)
SODIUM SERPL-SCNC: 134 MMOL/L (ref 136–145)
UNIT DISPENSE STATUS: NORMAL
UNIT PRODUCT CODE: NORMAL
UNIT RH: NORMAL
WBC # BLD AUTO: 15.72 THOUSAND/UL (ref 4.31–10.16)

## 2020-02-03 PROCEDURE — 85610 PROTHROMBIN TIME: CPT | Performed by: PHYSICIAN ASSISTANT

## 2020-02-03 PROCEDURE — 84100 ASSAY OF PHOSPHORUS: CPT | Performed by: PHYSICIAN ASSISTANT

## 2020-02-03 PROCEDURE — 99232 SBSQ HOSP IP/OBS MODERATE 35: CPT | Performed by: INTERNAL MEDICINE

## 2020-02-03 PROCEDURE — 83735 ASSAY OF MAGNESIUM: CPT | Performed by: PHYSICIAN ASSISTANT

## 2020-02-03 PROCEDURE — 82330 ASSAY OF CALCIUM: CPT | Performed by: PHYSICIAN ASSISTANT

## 2020-02-03 PROCEDURE — 99233 SBSQ HOSP IP/OBS HIGH 50: CPT | Performed by: EMERGENCY MEDICINE

## 2020-02-03 PROCEDURE — 85025 COMPLETE CBC W/AUTO DIFF WBC: CPT | Performed by: PHYSICIAN ASSISTANT

## 2020-02-03 PROCEDURE — 80076 HEPATIC FUNCTION PANEL: CPT | Performed by: PHYSICIAN ASSISTANT

## 2020-02-03 PROCEDURE — 80048 BASIC METABOLIC PNL TOTAL CA: CPT | Performed by: PHYSICIAN ASSISTANT

## 2020-02-03 PROCEDURE — 99356 PR PROLONGED SVC I/P OR OBS SETTING 1ST HOUR: CPT | Performed by: INTERNAL MEDICINE

## 2020-02-03 PROCEDURE — 82948 REAGENT STRIP/BLOOD GLUCOSE: CPT

## 2020-02-03 RX ORDER — MIDODRINE HYDROCHLORIDE 5 MG/1
5 TABLET ORAL
Status: DISCONTINUED | OUTPATIENT
Start: 2020-02-04 | End: 2020-02-04

## 2020-02-03 RX ORDER — MAGNESIUM SULFATE HEPTAHYDRATE 40 MG/ML
2 INJECTION, SOLUTION INTRAVENOUS ONCE
Status: COMPLETED | OUTPATIENT
Start: 2020-02-03 | End: 2020-02-03

## 2020-02-03 RX ADMIN — HEPARIN SODIUM 5000 UNITS: 5000 INJECTION INTRAVENOUS; SUBCUTANEOUS at 13:48

## 2020-02-03 RX ADMIN — NOREPINEPHRINE BITARTRATE 2 MCG/MIN: 1 INJECTION INTRAVENOUS at 21:52

## 2020-02-03 RX ADMIN — OXYCODONE HYDROCHLORIDE 5 MG: 5 TABLET ORAL at 10:06

## 2020-02-03 RX ADMIN — FENTANYL CITRATE 25 MCG: 50 INJECTION, SOLUTION INTRAMUSCULAR; INTRAVENOUS at 17:15

## 2020-02-03 RX ADMIN — HEPARIN SODIUM 5000 UNITS: 5000 INJECTION INTRAVENOUS; SUBCUTANEOUS at 05:44

## 2020-02-03 RX ADMIN — MONTELUKAST SODIUM 10 MG: 10 TABLET, FILM COATED ORAL at 21:47

## 2020-02-03 RX ADMIN — NOREPINEPHRINE BITARTRATE 5 MCG/MIN: 1 INJECTION INTRAVENOUS at 04:00

## 2020-02-03 RX ADMIN — LIDOCAINE 1 PATCH: 50 PATCH TOPICAL at 08:42

## 2020-02-03 RX ADMIN — GABAPENTIN 100 MG: 100 CAPSULE ORAL at 21:47

## 2020-02-03 RX ADMIN — ONDANSETRON 4 MG: 2 INJECTION INTRAMUSCULAR; INTRAVENOUS at 14:12

## 2020-02-03 RX ADMIN — PANTOPRAZOLE SODIUM 40 MG: 40 TABLET, DELAYED RELEASE ORAL at 05:44

## 2020-02-03 RX ADMIN — ALBUMIN (HUMAN) 12.5 G: 0.25 INJECTION, SOLUTION INTRAVENOUS at 02:31

## 2020-02-03 RX ADMIN — POTASSIUM & SODIUM PHOSPHATES POWDER PACK 280-160-250 MG 1 PACKET: 280-160-250 PACK at 10:07

## 2020-02-03 RX ADMIN — HEPARIN SODIUM 5000 UNITS: 5000 INJECTION INTRAVENOUS; SUBCUTANEOUS at 21:47

## 2020-02-03 RX ADMIN — LEVOTHYROXINE SODIUM 88 MCG: 88 TABLET ORAL at 05:44

## 2020-02-03 RX ADMIN — MEXILETINE HYDROCHLORIDE 150 MG: 150 CAPSULE ORAL at 21:51

## 2020-02-03 RX ADMIN — IVABRADINE 5 MG: 5 TABLET, FILM COATED ORAL at 16:28

## 2020-02-03 RX ADMIN — ALBUMIN (HUMAN) 12.5 G: 0.25 INJECTION, SOLUTION INTRAVENOUS at 08:42

## 2020-02-03 RX ADMIN — IVABRADINE 5 MG: 5 TABLET, FILM COATED ORAL at 08:44

## 2020-02-03 RX ADMIN — MEXILETINE HYDROCHLORIDE 150 MG: 150 CAPSULE ORAL at 16:28

## 2020-02-03 RX ADMIN — FENTANYL CITRATE 25 MCG: 50 INJECTION, SOLUTION INTRAMUSCULAR; INTRAVENOUS at 13:49

## 2020-02-03 RX ADMIN — ALBUMIN (HUMAN) 12.5 G: 0.25 INJECTION, SOLUTION INTRAVENOUS at 20:20

## 2020-02-03 RX ADMIN — OXYCODONE HYDROCHLORIDE 5 MG: 5 TABLET ORAL at 21:57

## 2020-02-03 RX ADMIN — OXYCODONE HYDROCHLORIDE 5 MG: 5 TABLET ORAL at 05:44

## 2020-02-03 RX ADMIN — MAGNESIUM SULFATE HEPTAHYDRATE 2 G: 40 INJECTION, SOLUTION INTRAVENOUS at 10:08

## 2020-02-03 RX ADMIN — MEXILETINE HYDROCHLORIDE 150 MG: 150 CAPSULE ORAL at 08:48

## 2020-02-03 RX ADMIN — ALBUMIN (HUMAN) 12.5 G: 0.25 INJECTION, SOLUTION INTRAVENOUS at 13:51

## 2020-02-03 RX ADMIN — ONDANSETRON 4 MG: 2 INJECTION INTRAMUSCULAR; INTRAVENOUS at 19:30

## 2020-02-03 RX ADMIN — ASPIRIN 81 MG: 81 TABLET ORAL at 08:42

## 2020-02-03 RX ADMIN — OXYCODONE HYDROCHLORIDE 5 MG: 5 TABLET ORAL at 16:28

## 2020-02-03 RX ADMIN — INSULIN LISPRO 2 UNITS: 100 INJECTION, SOLUTION INTRAVENOUS; SUBCUTANEOUS at 21:47

## 2020-02-03 NOTE — PROGRESS NOTES
Daily Progress Note - Critical Care   Fer Aguilar 49 y o  female MRN: [id number]  Unit/Bed#: [facility] 516-01 Encounter: [id number]        ----------------------------------------------------------------------------------------  HPI/24hr events: Patient received 1u PRBC yesterday for hemoglobin 6 8 with appropriate response  Levophed titrated down to 3mcg, vasopressin now off  Multiple bowel movements overnight      ---------------------------------------------------------------------------------------  SUBJECTIVE  "I'm in more pain today, mostly back and spine pain  I finally just got comfortable"    Review of Systems   Respiratory: Negative for shortness of breath  Musculoskeletal: Positive for back pain and myalgias      ---------------------------------------------------------------------------------------  Assessment and Plan:    Neuro:    Pain  o PRN oxycodone, fentanyl   o lidocaine patches   o Gabapentin HS  o Palliative consult, appreciate recommendations   CAM ICU   Sleep hygiene      CV:    Shock, multifactorial with possible cardiorenal syndrome   - Wean levophed as tolerated for goal MAP >65   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%)  o Judicious fluid administration   o Close I&Os  o Daily weights   Hx of Ventricular tachycardia  o ICD in place   o Continue mexiletine, ivabradine   · Chronic atrial fibrillation  · Anticoagulation held secondary to supra therapeutic INR  · Continue ASA       Pulm:  o Subjective dyspnea  - Nasal cannula for goal Sp02 >92%  - Pulmonary hygiene       GI:    Elevated LFTs secondary to diffuse liver metastasis   o GI following, no available interventions due to grim prognosis  No biliary obstruction on imaging  o LFTs trending down, direct bili 5 3 this morning, INR 1 79  o Palliative care following   Severe protein calorie malnutrition   o Diet as tolerated  o PRN zofran for nausea   o Continue bowel regimen and PUD ppx       :    SACHI, improving  o ? Hepatorenal syndrome  o Continue albumin 25% q6 x 3 days   o Creatinine 1 04 from 1 7  o Urine output 2 2L/24 hours  o Monitor I&Os  o Daily weights       F/E/N:   · Albumin 25% q6 for a total of 3 days secondary to volume depletion on echo (started on 2/1)   Hyponatremia: multifactorial in the setting of liver and heart failure as well as possible SIADH from cancer, improving  o Sodium 134 today from 130 yesterday  o Patient received a dose of Samsca on 2/1  o Nephrology following  · Protein calorie malnutrition  · Diet as tolerated  · PRN zofran       Heme/Onc:    Breast cancer with bone and liver metastasis   o Follows with Guevara Figueredo from [facility] On[facility] following, based on new imaging, recommends Hospice as no reversible pathology present given extensive liver mets  o Palliative following    Anemia  o S/p 1u PRBC yesterday  · Coagulopathy, multifactorial in the setting of coumadin use and liver metastasis   · INR 1 79 today      Endo:    DM II  o Continue SSI   o Hypothyroid  - Continue levothyroxine     ID:    Blood cultures pending    Low suspicion for infectious etiology       MSK/Skin:    Frequent turns/reposition      Disposition: Continue Critical Care   Code Status: Level 1 - Full Code  ---------------------------------------------------------------------------------------  ICU CORE MEASURES    Prophylaxis   VTE Pharmacologic Prophylaxis: Heparin  VTE Mechanical Prophylaxis: sequential compression device  Stress Ulcer Prophylaxis: Pantoprazole PO      Invasive Devices Review  Invasive Devices     Central Venous Catheter Line            CVC Central Lines 02/01/20 Triple Right Internal jugular 1 day          Arterial Line            Arterial Line 02/01/20 Left Radial 1 day          Drain            Urethral Catheter Temperature probe 14 Fr  1 day              Can any invasive devices be discontinued today? No  ---------------------------------------------------------------------------------------  OBJECTIVE    Vitals   Vitals:    20 0600 20 0700 20 0800 20 0900   BP:       BP Location:       Pulse: 74  76 76   Resp: (!) 24  21 19   Temp:  99 7 °F (37 6 °C)     TempSrc:  Oral     SpO2: 92%  (!) 89% 97%   Weight:       Height:         Temp (24hrs), Av 7 °F (37 1 °C), Min:97 6 °F (36 4 °C), Max:99 7 °F (37 6 °C)  Current: Temperature: 99 7 °F (37 6 °C)    Physical Exam   Constitutional: She is oriented to person, place, and time  She appears well-developed  She appears ill  HENT:   Head: Normocephalic and atraumatic  Mouth/Throat: No oropharyngeal exudate  Eyes: Pupils are equal, round, and reactive to light  EOM are normal  Scleral icterus is present  Neck: Normal range of motion  Neck supple  No JVD present  R IJ TLC   Cardiovascular: Normal rate, regular rhythm, normal heart sounds and intact distal pulses  Exam reveals no friction rub  No murmur heard  Pulmonary/Chest: Effort normal  No accessory muscle usage  No respiratory distress  She has decreased breath sounds  Abdominal: Soft  Bowel sounds are normal  She exhibits distension  There is no tenderness  There is no guarding  Musculoskeletal: Normal range of motion  She exhibits no edema  Neurological: She is alert and oriented to person, place, and time  No cranial nerve deficit  Skin: Skin is warm and dry  Capillary refill takes less than 2 seconds   No erythema    jaundice         Respiratory:  SpO2: SpO2: 97 %  O2 Flow Rate (L/min): 2 L/min    Invasive/non-invasive ventilation settings   Respiratory    Lab Data (Last 4 hours)    None         O2/Vent Data (Last 4 hours)    None                Laboratory and Diagnostics:  Results from last 7 days   Lab Units 20  0538 20  1122 20  0453 20  0545 20  0524 20  1150   WBC Thousand/uL 15 72*  --  18 50* 16 13* 12 50* 22 09*   HEMOGLOBIN g/dL 7 8* 7 9* 6 8* 7 9* 8 0* 10 2*   HEMATOCRIT % 23 6*  --  20 3* 24 7* 25 2* 30 8*   PLATELETS Thousands/uL 205  --  212 241 273 328   NEUTROS PCT % 81*  --   --   --   --   --    BANDS PCT %  --   --  1  --  2 2   MONOS PCT % 10  --   --   --   --   --    MONO PCT %  --   --  4 6 7 10     Results from last 7 days   Lab Units 02/03/20  0538 02/02/20  0453 02/01/20  0545 01/31/20  0524 01/30/20  0444 01/29/20  1150   SODIUM mmol/L 134* 130* 125* 126* 128* 132*   POTASSIUM mmol/L 3 9 4 7 5 2 4 7 4 3 5 1   CHLORIDE mmol/L 103 100 93* 93* 95* 99*   CO2 mmol/L 23 22 23 25 26 26   ANION GAP mmol/L 8 8 9 8 7 7   BUN mg/dL 23 47* 61* 44* 26* 25   CREATININE mg/dL 1 04 1 71* 2 43* 1 84* 0 89 1 11   CALCIUM mg/dL 9 6 9 1 8 9 8 9 9 3 9 9   GLUCOSE RANDOM mg/dL 127 138 92 177* 136 173*   ALT U/L 131* 200* 328* 417* 174* 205*   AST U/L 224* 508* 1,100* 1,255* 469* 519*   ALK PHOS U/L 1,010* 1,094* 1,414* 1,248* 995* 1,165*   ALBUMIN g/dL 2 4* 2 3* 1 8* 1 9* 1 9* 2 2*   TOTAL BILIRUBIN mg/dL 6 95* 6 03* 5 02* 4 55* 5 00* 4 11*     Results from last 7 days   Lab Units 02/03/20  0538 01/31/20  0524   MAGNESIUM mg/dL 1 6 1 9   PHOSPHORUS mg/dL 2 2*  --       Results from last 7 days   Lab Units 02/03/20  0538 02/02/20  0453 01/31/20  0524 01/30/20  0444 01/29/20 2125 01/29/20  1154   INR  1 79* 2 51* 1 73* 1 54* 2 16* >15 00*   PTT seconds  --   --   --   --   --  89*      Results from last 7 days   Lab Units 01/29/20  1150   TROPONIN I ng/mL <0 02         ABG:    VBG:  Results from last 7 days   Lab Units 02/02/20  0622   PH DARIUS  7 374   PCO2 DARIUS mm Hg 36 3*   PO2 DARIUS mm Hg 33 8*   HCO3 DARIUS mmol/L 20 7*   BASE EXC DARIUS mmol/L -4 1           Micro  Results from last 7 days   Lab Units 02/01/20  0546   BLOOD CULTURE  No Growth at 24 hrs  No Growth at 24 hrs         EKG: AV paced on telemetry   Imaging: No new imaging today     Intake and Output  I/O       02/01 0701 - 02/02 0700 02/02 0701 - 02/03 0700 02/03 0701 - 02/04 0700 P O  460 1521 200    I V  (mL/kg) 550 4 (7 2) 326 (4 2) 22 6 (0 3)    Blood  350     IV Piggyback 2100 150 80    Total Intake(mL/kg) 3110 4 (40 5) 2347 (30 6) 302 6 (3 9)    Urine (mL/kg/hr) 4250 (2 3) 2218 (1 2) 300 (1 3)    Emesis/NG output   0    Stool 0 0 0    Total Output 4250 2218 300    Net -1139 6 +129 +2 6           Unmeasured Stool Occurrence 1 x 1 x 0 x          Height and Weights   Height: 5' 8" (172 7 cm)  IBW: 63 9 kg  Body mass index is 25 74 kg/m²  Weight (last 2 days)     Date/Time   Weight    02/02/20 0600   76 8 (169 31) with aqua K pad    Weight: with aqua K pad at 02/02/20 0600                Nutrition       Diet Orders   (From admission, onward)             Start     Ordered    02/01/20 1913  Diet Cardiovascular; Sodium 2 GM; Fluid Restriction 1500 ML  Diet effective now     Question Answer Comment   Diet Type Cardiovascular    Cardiac Sodium 2 GM    Other Restriction(s): Fluid Restriction 1500 ML    RD to adjust diet per protocol?  Yes        02/01/20 1921                  Active Medications  Scheduled Meds:  Current Facility-Administered Medications:  albumin human 12 5 g Intravenous Q6H Francisco Steinberg PA-C Last Rate: Stopped (02/03/20 0901)   aspirin 81 mg Oral Daily Lucía Champion PA-C    fentanyl citrate (PF) 25 mcg Intravenous Q2H PRN Lucía Champion PA-C    gabapentin 100 mg Oral HS Lucía Champion PA-C    heparin (porcine) 5,000 Units Subcutaneous [facility] Lucía Champion PA-C    insulin lispro 1-5 Units Subcutaneous 4x Daily (AC & HS) Lucía Champion PA-C    ivabradine HCl 5 mg Oral BID With Meals Lucía Champion PA-C    levothyroxine 88 mcg Oral Early Morning Lucía Champion PA-C    lidocaine 1 patch Topical Daily Lucía Champion PA-C    magnesium sulfate 2 g Intravenous Once Daun Rory, CRNP    mexiletine 150 mg Oral TID Lucía Champion PA-C    montelukast 10 mg Oral HS Lucía Champion PA-C    norepinephrine 1-30 mcg/min Intravenous Titrated Larry Banks Abiodun Campuzano PA-C Last Rate: 3 mcg/min (02/03/20 0945)   ondansetron 4 mg Intravenous Q4H PRN Olga Borders, TEODORA    oxyCODONE 5 mg Oral Q4H PRN Olga Borders, TEODORA    pantoprazole 40 mg Oral Early Morning Olga Borders, TEODORA    polyethylene glycol 17 g Oral Daily Olga Borders, TEODORA    potassium-sodium phosphates 1 packet Oral Once JESSI Vines    vasopressin (PITRESSIN) in 0 9 % sodium chloride 100 mL 0 04 Units/min Intravenous Continuous Leigh Ann Ugarte PA-C Last Rate: Stopped (02/02/20 2230)     Continuous Infusions:    norepinephrine 1-30 mcg/min Last Rate: 3 mcg/min (02/03/20 0945)   vasopressin (PITRESSIN) in 0 9 % sodium chloride 100 mL 0 04 Units/min Last Rate: Stopped (02/02/20 2230)     PRN Meds:     fentanyl citrate (PF) 25 mcg Q2H PRN   ondansetron 4 mg Q4H PRN   oxyCODONE 5 mg Q4H PRN       Allergies   Allergies   Allergen Reactions    Ambien [Zolpidem]     Entresto [Sacubitril-Valsartan] Other (See Comments)     Hypotension     Other      Other reaction(s): Unknown  Adhesive Tape    Bee Pollen Rash     Pollen     ---------------------------------------------------------------------------------------  Advance Directive and Living Will:      Power of :    POLST:    ---------------------------------------------------------------------------------------  Counseling / Coordination of Care  Total time spent today 32 minutes  Greater than 50% of total time was spent with the patient and / or family counseling and / or coordination of care  JESSI Vines      Portions of the record may have been created with voice recognition software  Occasional wrong word or "sound a like" substitutions may have occurred due to the inherent limitations of voice recognition software    Read the chart carefully and recognize, using context, where substitutions have occurred

## 2020-02-03 NOTE — SOCIAL WORK
LSW joined Dr Suha Sapp to meet with patient, brothers, friend, and sister-in-law  Patient and family request information regarding rehab to hospice at a nursing facility  The family will assist the patient in discussing her current medical status with her Oncologist   Patient became overwhelmed by the conversation and requests that the medical team and her family discuss further outside of the room  The family understands the patient's current medical status and would like her to focus on her comfort  The patient's family resides in [place]  Patient is in agreement to go to [place] for rehab/hospice    The family feels that it is important for the patient to discuss her medical wishes  A 5 wishes was provided to the family to discuss further with the patient  LSW attempted to return but patient was resting  LSW will continue to follow regularly  Counseling / Coordination of Care  Total floor / unit time spent today 60 minutes  Greater than 50% of total time was spent with the patient and / or family counseling and / or coordination of care   A description of the counseling / coordination of care: support

## 2020-02-03 NOTE — PROGRESS NOTES
Progress note - Palliative and Supportive Care   Mara Lozano 49 y o  female [id number]    Assessment:   -   Patient Active Problem List   Diagnosis    SOB (shortness of breath)    Ventricular tachycardia (HCC)    Chronic atrial fibrillation    Metastatic breast cancer (HCC)    Supratherapeutic INR    Elevated LFTs    Chronic systolic heart failure ([facility]    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    Type 2 diabetes mellitus without complication, without long-term current use of insulin ([facility] )    Hyponatremia    Palliative care patient         Plan:  1  Symptom management - Continue PRN opioids, did discuss that there may be a point where we will need to consider side effects of drowsiness and hypotension acceptable for adequately treating her pain -     2  Goals -   PALLIATIVE AND SUPPORTIVE CARE FAMILY CONFERENCE:    Time of Meeting: 10:45    Participants: Patient (initially and then asked to rest), patient's good friend- Rhea Diop, 2 of her 3 brothers and their spouses  Myself, Josenataly Lara, [facility] and Connie Vania, [facility]    Patient Participation: initially    Patient Support System: present     Meeting Location: bedside and 5th floor waiting area    Advanced Directive of POLST available: no    A family meeting was held for Ms Mara Lozano  This meeting was necessary for determine the appropriate course of treatment  Topics of Discussion:  - review of clinical situation  - discussion regarding cancer disease progression  - review of hospice and services offered  - long conversation held regarding the nuances as she would like to go to [address] to be near family and discussed home hospice versus hospice at a facility and the cost of room/board, how to pay as she will not be able to apply for MA in [place]   (Permanent resident of [place])  - second meeting held at a later time to discuss prognosis with patient    Other Content of Meeting:  - time spent for all questions/concerns to be addressed to patient and family's satisfaction  - time spent providing psychosocial support    PLAN:  - hospice referral  - patient to work on her advanced directive today for POA paperwork  - DNR status, likely by end of day, patient is requesting time prior to this order  Time Involved in Meetin minutes+, beginning at approximately  1045 and ending at approximately 1145  Interval history:       Weekend events reviewed  Family has arrived from [place] and want to discuss goals of care  Patient part of conversation initially but then asked to rest and meeting moved to out of her room  She continues with pain, going to attempt use of  Fentanyl as still requiring Levophed       MEDICATIONS / ALLERGIES:     all current active meds have been reviewed and current meds:   Current Facility-Administered Medications   Medication Dose Route Frequency    albumin human (FLEXBUMIN) 25 % injection 12 5 g  12 5 g Intravenous Q6H    aspirin (ECOTRIN LOW STRENGTH) EC tablet 81 mg  81 mg Oral Daily    fentanyl citrate (PF) 100 MCG/2ML 25 mcg  25 mcg Intravenous Q2H PRN    gabapentin (NEURONTIN) capsule 100 mg  100 mg Oral HS    heparin (porcine) subcutaneous injection 5,000 Units  5,000 Units Subcutaneous Q8H [facility]    insulin lispro (HumaLOG) 100 units/mL subcutaneous injection 1-5 Units  1-5 Units Subcutaneous 4x Daily (AC & HS)    ivabradine HCl (CORLANOR) tablet 5 mg  5 mg Oral BID With Meals    levothyroxine tablet 88 mcg  88 mcg Oral Early Morning    lidocaine (LIDODERM) 5 % patch 1 patch  1 patch Topical Daily    mexiletine (MEXITIL) capsule 150 mg  150 mg Oral TID    montelukast (SINGULAIR) tablet 10 mg  10 mg Oral HS    norepinephrine (LEVOPHED) 4 mg (STANDARD CONCENTRATION) IV in sodium chloride 0 9% 250 mL  1-30 mcg/min Intravenous Titrated    ondansetron (ZOFRAN) injection 4 mg  4 mg Intravenous Q4H PRN    oxyCODONE (ROXICODONE) IR tablet 5 mg  5 mg Oral Q4H PRN    pantoprazole (PROTONIX) EC tablet 40 mg  40 mg Oral Early Morning    polyethylene glycol (MIRALAX) packet 17 g  17 g Oral Daily    vasopressin (PITRESSIN) 20 Units in sodium chloride 0 9 % 100 mL infusion  0 04 Units/min Intravenous Continuous       Allergies   Allergen Reactions    Ambien [Zolpidem]     Entresto [Sacubitril-Valsartan] Other (See Comments)     Hypotension     Other      Other reaction(s): Unknown  Adhesive Tape    Bee Pollen Rash     Pollen       OBJECTIVE:    Physical Exam  Physical Exam   Constitutional: She is oriented to person, place, and time  No distress  HENT:   Head: Normocephalic and atraumatic  Right Ear: External ear normal    Left Ear: External ear normal    Eyes: Right eye exhibits no discharge  Left eye exhibits no discharge  Scleral icterus is present  Cardiovascular: Normal rate and intact distal pulses  Pulmonary/Chest: Effort normal  No respiratory distress  Abdominal: Soft  She exhibits no distension  Musculoskeletal: She exhibits no edema  Neurological: She is alert and oriented to person, place, and time  Skin: There is pallor  jaundice   Psychiatric: She has a normal mood and affect  Nursing note and vitals reviewed  Lab Results:   I have personally reviewed pertinent labs  , CBC:   Lab Results   Component Value Date    WBC 15 72 (H) 02/03/2020    HGB 7 8 (L) 02/03/2020    HCT 23 6 (L) 02/03/2020    MCV 98 02/03/2020     02/03/2020    MCH 32 2 02/03/2020    MCHC 33 1 02/03/2020    RDW 18 7 (H) 02/03/2020    MPV 10 7 02/03/2020    NRBC 1 02/03/2020   , CMP:   Lab Results   Component Value Date    SODIUM 134 (L) 02/03/2020    K 3 9 02/03/2020     02/03/2020    CO2 23 02/03/2020    BUN 23 02/03/2020    CREATININE 1 04 02/03/2020    CALCIUM 9 6 02/03/2020     (H) 02/03/2020     (H) 02/03/2020    ALKPHOS 1,010 (H) 02/03/2020    EGFR 61 02/03/2020     Imaging Studies: reviewed  EKG, Pathology, and Other Studies: reviewed    Counseling / Coordination of Care  Total floor / unit time spent today 65+ minutes  Greater than 50% of total time was spent with the patient and / or family counseling and / or coordination of care  A description of the counseling / coordination of care: goals of care, end of life issues, supportive listening  In addition, I provided prolonged care to patient regarding family care conference which started at approximately [phone] and lasted until [phone]

## 2020-02-03 NOTE — PROGRESS NOTES
NEPHROLOGY PROGRESS NOTE   Mara Lozano 49 y o  female MRN: [id number]  Unit/Bed#: [facility] 516-01 Encounter: [id number]  Reason for Consult: SACHI    ASSESSMENT AND PLAN:  SACHI suspected to be multifactorial, likely prerenal in the setting of hypotension, blood loss anemia  -with inotropic support, blood transfusion, creatinine now significantly improving from peak level 2 4 to 1 0 today  -urine output good  -avoid nephrotoxins or NSAIDs  -avoid hypotension  -urinalysis bland without hematuria or proteinuria on 1/31/20  -urine sodium 55  -CT scan without contrast shows no hydronephrosis  -continue IV albumin for next 24 hours  -okay from renal standpoint to discontinue Ferrera catheter    Hyponatremia  -suspected to be in the setting of malignancy/SIADH  -status post tolvaptan one dose with improvement in serum sodium, sodium level improving to 134, continue to monitor  Metastatic breast cancer bone involvement  -ongoing discussion regarding goals of care as per palliative care team   Patient may be considering hospice care  Hypophosphatemia, serum phosphorus 2 2 status post one packet phosphorus supplement today  Will give one more dose later today  Hypotension, on Levophed  -blood culture negative so far  -echo shows EF 43%, grade 1 diastolic dysfunction, no pericardial effusion  Discussed above plan in detail with palliative care team   Ongoing discussion regarding goals of care  SUBJECTIVE:  Patient seen and examined at bedside   No chest pain, shortness of breath, nausea, vomiting    OBJECTIVE:  Current Weight: Weight - Scale: 76 8 kg (169 lb 5 oz)(with aqua K pad)  Vitals:    02/03/20 1128   BP:    Pulse:    Resp:    Temp: 99 °F (37 2 °C)   SpO2:        Intake/Output Summary (Last 24 hours) at 2/3/2020 1223  Last data filed at 2/3/2020 0901  Gross per 24 hour   Intake 1842 6 ml   Output 1758 ml   Net 84 6 ml     Wt Readings from Last 3 Encounters:   02/02/20 76 8 kg (169 lb 5 oz)     Temp Readings from Last 3 Encounters:   02/03/20 99 °F (37 2 °C) (Oral)     BP Readings from Last 3 Encounters:   02/02/20 (!) 99/31     Pulse Readings from Last 3 Encounters:   02/03/20 76        Physical Examination:  General:  Lying in bed, no acute distress   Eyes:  Mild conjunctival pallor present  ENT:  External examination of ears and nose unremarkable  Neck:  No obvious lymphadenopathy appreciated  Respiratory:  Bilateral air entry present  CVS:  S1, S2 present  GI:  Soft, nontender, nondistended  CNS:  Active alert oriented x3  Extremities:  No significant edema in legs  Skin:  No new rash in legs    Medications:    Current Facility-Administered Medications:     albumin human (FLEXBUMIN) 25 % injection 12 5 g, 12 5 g, Intravenous, Q6H, Magali Ram PA-C, Stopped at 02/03/20 0901    aspirin (ECOTRIN LOW STRENGTH) EC tablet 81 mg, 81 mg, Oral, Daily, Cat James PA-C, 81 mg at 02/03/20 7247    fentanyl citrate (PF) 100 MCG/2ML 25 mcg, 25 mcg, Intravenous, Q2H PRN, Cat James PA-C    gabapentin (NEURONTIN) capsule 100 mg, 100 mg, Oral, HS, BUCKY Lang-MYLES, 100 mg at 02/02/20 2125    heparin (porcine) subcutaneous injection 5,000 Units, 5,000 Units, Subcutaneous, Q8H [facility], Cat James PA-C, 5,000 Units at 02/03/20 0544    insulin lispro (HumaLOG) 100 units/mL subcutaneous injection 1-5 Units, 1-5 Units, Subcutaneous, 4x Daily (AC & HS), 2 Units at 02/02/20 2126 **AND** Fingerstick Glucose (POCT), , , 4x Daily AC and at bedtime, Cat James PA-C    ivabradine HCl (CORLANOR) tablet 5 mg, 5 mg, Oral, BID With Meals, BUCKY Lang-MYLES, 5 mg at 02/03/20 0844    levothyroxine tablet 88 mcg, 88 mcg, Oral, Early Morning, Cat James PA-C, 88 mcg at 02/03/20 0544    lidocaine (LIDODERM) 5 % patch 1 patch, 1 patch, Topical, Daily, Cat James PA-C, 1 patch at 02/03/20 7915    mexiletine (MEXITIL) capsule 150 mg, 150 mg, Oral, TID, Cat James PA-C, 150 mg at 02/03/20 0848    montelukast (SINGULAIR) tablet 10 mg, 10 mg, Oral, HS, Floy Sibley, PA-C, 10 mg at 02/02/20 2124    norepinephrine (LEVOPHED) 4 mg (STANDARD CONCENTRATION) IV in sodium chloride 0 9% 250 mL, 1-30 mcg/min, Intravenous, Titrated, Linford Binder, PA-C, Last Rate: 11 3 mL/hr at 02/03/20 0945, 3 mcg/min at 02/03/20 0945    ondansetron (ZOFRAN) injection 4 mg, 4 mg, Intravenous, Q4H PRN, Floy Sibley, PA-C, 4 mg at 02/02/20 1115    oxyCODONE (ROXICODONE) IR tablet 5 mg, 5 mg, Oral, Q4H PRN, Floy Sibley, PA-C, 5 mg at 02/03/20 1006    pantoprazole (PROTONIX) EC tablet 40 mg, 40 mg, Oral, Early Morning, Floy Sibley, PA-C, 40 mg at 02/03/20 0544    polyethylene glycol (MIRALAX) packet 17 g, 17 g, Oral, Daily, Floy Sibley, PA-C, 17 g at 02/02/20 0834    vasopressin (PITRESSIN) 20 Units in sodium chloride 0 9 % 100 mL infusion, 0 04 Units/min, Intravenous, Continuous, Linford Binder, PA-C, Stopped at 02/02/20 2230    Laboratory Results:  Results from last 7 days   Lab Units 02/03/20  0538 02/02/20  1122 02/02/20  0453 02/01/20  0545 01/31/20  0524 01/30/20  0444 01/29/20  1150   WBC Thousand/uL 15 72*  --  18 50* 16 13* 12 50*  --  22 09*   HEMOGLOBIN g/dL 7 8* 7 9* 6 8* 7 9* 8 0*  --  10 2*   HEMATOCRIT % 23 6*  --  20 3* 24 7* 25 2*  --  30 8*   PLATELETS Thousands/uL 205  --  212 241 273  --  328   SODIUM mmol/L 134*  --  130* 125* 126* 128* 132*   POTASSIUM mmol/L 3 9  --  4 7 5 2 4 7 4 3 5 1   CHLORIDE mmol/L 103  --  100 93* 93* 95* 99*   CO2 mmol/L 23  --  22 23 25 26 26   BUN mg/dL 23  --  47* 61* 44* 26* 25   CREATININE mg/dL 1 04  --  1 71* 2 43* 1 84* 0 89 1 11   CALCIUM mg/dL 9 6  --  9 1 8 9 8 9 9 3 9 9   MAGNESIUM mg/dL 1 6  --   --   --  1 9  --   --    PHOSPHORUS mg/dL 2 2*  --   --   --   --   --   --        XR chest portable   Final Result by Marilu Linda MD (02/02 1220)      No acute cardiopulmonary disease        Right IJ central line has been placed with tip at the cavoatrial junction  No pneumothorax  Workstation performed: [id number]         CT abdomen pelvis wo contrast   Final Result by Mark Cuellar MD (01/31 1014)      Redemonstration of diffuse metastatic hepatic disease  Diffuse osseous metastatic disease  No pathologic fractures are identified  Cholelithiasis without CT evidence for acute cholecystitis  Workstation performed: [id number]         US right upper quadrant   Final Result by Felix Chao MD (01/30 1549)      Moderate hepatomegaly, with diffusely heterogeneous nodular liver parenchyma in keeping with history of widespread metastatic disease  There is no evidence of biliary ductal dilatation  There is sludge and small gallstone within the gallbladder, without evidence of acute cholecystitis  Workstation performed: [id number]         XR chest 1 view portable   ED Interpretation by Abhilash Downing DO (01/29 1316)   No acute cardiopulmonary disease      Final Result by Carol Kat MD (01/29 1353)   Typical left-sided ICD      No acute cardiopulmonary disease  Findings are consistent with emergency provider's preliminary reading                     Workstation performed: [id number]             Portions of the record may have been created with voice recognition software  Occasional wrong word or "sound a like" substitutions may have occurred due to the inherent limitations of voice recognition software  Read the chart carefully and recognize, using context, where substitutions have occurred

## 2020-02-03 NOTE — SOCIAL WORK
Family provided three snf choices in [facility]  ECIN referrals were made and none of these snf's have bed available so cannot accept the patient  Met with brother, Leonie Jernigan and friend, Lady Norwood, to discuss  Brother reviewed snf list and provided 5 other choices  ECIN referrals were made  Discussed that cost of ambulance transport will be largely private pay and family reports patient wants to be near her family for visit  CM will discuss anticipated cost of BLS with transport company when we have an accepting facility

## 2020-02-03 NOTE — SOCIAL WORK
MSW was asked to meet with [facility] and family, brothers-Doyle and Miguelangel Kvng and friend-Barbara  They are asking about moving patient to snf in [place], [place] area, [place] [address] to be close to family  Family mentioned facility called [facility] rehab  A post acute care recommendation was made by your care team for STR  Discussed Freedom of Choice with caregiver  List of facilities given to caregiver via in person  caregiver aware the list is custom filtered for them by zip code location and that [facility] post acute providers are designated  Family was also given Pet Visitation policy to obtain records from vet  PCM to assist with completion of [facility]

## 2020-02-03 NOTE — PROGRESS NOTES
Family meeting with two brothers, Gabi Ferreira and Andres Seals, and two close friends regarding plan of care  Prognosis and hospice options were discussed later including , [facility] Vences to assist with SNF in [place] area  Provided emotional support  Okay per Dr Lupillo Humphries to change to regular diet  Maintain maps of 65 with levophed titrated  Hospice consult placed  will continue to monitor

## 2020-02-04 PROBLEM — K76.7 HEPATORENAL SYNDROME (HCC): Status: ACTIVE | Noted: 2020-02-04

## 2020-02-04 LAB
ALBUMIN SERPL BCP-MCNC: 2.4 G/DL (ref 3.5–5)
ALP SERPL-CCNC: 907 U/L (ref 46–116)
ALT SERPL W P-5'-P-CCNC: 101 U/L (ref 12–78)
ANION GAP SERPL CALCULATED.3IONS-SCNC: 5 MMOL/L (ref 4–13)
AST SERPL W P-5'-P-CCNC: 148 U/L (ref 5–45)
BILIRUB SERPL-MCNC: 6.18 MG/DL (ref 0.2–1)
BUN SERPL-MCNC: 13 MG/DL (ref 5–25)
CALCIUM SERPL-MCNC: 9.9 MG/DL (ref 8.3–10.1)
CHLORIDE SERPL-SCNC: 101 MMOL/L (ref 100–108)
CO2 SERPL-SCNC: 27 MMOL/L (ref 21–32)
CREAT SERPL-MCNC: 0.78 MG/DL (ref 0.6–1.3)
ERYTHROCYTE [DISTWIDTH] IN BLOOD BY AUTOMATED COUNT: 18.8 % (ref 11.6–15.1)
GFR SERPL CREATININE-BSD FRML MDRD: 87 ML/MIN/1.73SQ M
GLUCOSE SERPL-MCNC: 109 MG/DL (ref 65–140)
GLUCOSE SERPL-MCNC: 120 MG/DL (ref 65–140)
GLUCOSE SERPL-MCNC: 146 MG/DL (ref 65–140)
GLUCOSE SERPL-MCNC: 153 MG/DL (ref 65–140)
GLUCOSE SERPL-MCNC: 209 MG/DL (ref 65–140)
HCT VFR BLD AUTO: 22.4 % (ref 34.8–46.1)
HGB BLD-MCNC: 7.4 G/DL (ref 11.5–15.4)
MAGNESIUM SERPL-MCNC: 1.6 MG/DL (ref 1.6–2.6)
MCH RBC QN AUTO: 32.7 PG (ref 26.8–34.3)
MCHC RBC AUTO-ENTMCNC: 33 G/DL (ref 31.4–37.4)
MCV RBC AUTO: 99 FL (ref 82–98)
PLATELET # BLD AUTO: 181 THOUSANDS/UL (ref 149–390)
PMV BLD AUTO: 10.6 FL (ref 8.9–12.7)
POTASSIUM SERPL-SCNC: 3.7 MMOL/L (ref 3.5–5.3)
PROT SERPL-MCNC: 5.8 G/DL (ref 6.4–8.2)
RBC # BLD AUTO: 2.26 MILLION/UL (ref 3.81–5.12)
SODIUM SERPL-SCNC: 133 MMOL/L (ref 136–145)
WBC # BLD AUTO: 11.05 THOUSAND/UL (ref 4.31–10.16)

## 2020-02-04 PROCEDURE — 82948 REAGENT STRIP/BLOOD GLUCOSE: CPT

## 2020-02-04 PROCEDURE — 99233 SBSQ HOSP IP/OBS HIGH 50: CPT | Performed by: EMERGENCY MEDICINE

## 2020-02-04 PROCEDURE — NC001 PR NO CHARGE: Performed by: EMERGENCY MEDICINE

## 2020-02-04 PROCEDURE — 99232 SBSQ HOSP IP/OBS MODERATE 35: CPT | Performed by: INTERNAL MEDICINE

## 2020-02-04 PROCEDURE — 80053 COMPREHEN METABOLIC PANEL: CPT | Performed by: NURSE PRACTITIONER

## 2020-02-04 PROCEDURE — 85027 COMPLETE CBC AUTOMATED: CPT | Performed by: NURSE PRACTITIONER

## 2020-02-04 PROCEDURE — 83735 ASSAY OF MAGNESIUM: CPT | Performed by: NURSE PRACTITIONER

## 2020-02-04 RX ORDER — MIDODRINE HYDROCHLORIDE 5 MG/1
10 TABLET ORAL
Status: DISCONTINUED | OUTPATIENT
Start: 2020-02-04 | End: 2020-02-11 | Stop reason: HOSPADM

## 2020-02-04 RX ORDER — LIDOCAINE 50 MG/G
1 PATCH TOPICAL DAILY
Status: DISCONTINUED | OUTPATIENT
Start: 2020-02-04 | End: 2020-02-11 | Stop reason: HOSPADM

## 2020-02-04 RX ORDER — FENTANYL 12 UG/H
12 PATCH TRANSDERMAL
Status: DISCONTINUED | OUTPATIENT
Start: 2020-02-04 | End: 2020-02-11 | Stop reason: HOSPADM

## 2020-02-04 RX ORDER — POTASSIUM CHLORIDE 20 MEQ/1
40 TABLET, EXTENDED RELEASE ORAL ONCE
Status: COMPLETED | OUTPATIENT
Start: 2020-02-04 | End: 2020-02-04

## 2020-02-04 RX ADMIN — ALBUMIN (HUMAN) 12.5 G: 0.25 INJECTION, SOLUTION INTRAVENOUS at 01:46

## 2020-02-04 RX ADMIN — ALBUMIN (HUMAN) 12.5 G: 0.25 INJECTION, SOLUTION INTRAVENOUS at 14:30

## 2020-02-04 RX ADMIN — MEXILETINE HYDROCHLORIDE 150 MG: 150 CAPSULE ORAL at 21:53

## 2020-02-04 RX ADMIN — MIDODRINE HYDROCHLORIDE 10 MG: 5 TABLET ORAL at 09:21

## 2020-02-04 RX ADMIN — MEXILETINE HYDROCHLORIDE 150 MG: 150 CAPSULE ORAL at 09:24

## 2020-02-04 RX ADMIN — INSULIN LISPRO 1 UNITS: 100 INJECTION, SOLUTION INTRAVENOUS; SUBCUTANEOUS at 21:53

## 2020-02-04 RX ADMIN — ALBUMIN (HUMAN) 12.5 G: 0.25 INJECTION, SOLUTION INTRAVENOUS at 09:21

## 2020-02-04 RX ADMIN — IVABRADINE 5 MG: 5 TABLET, FILM COATED ORAL at 16:21

## 2020-02-04 RX ADMIN — INSULIN LISPRO 1 UNITS: 100 INJECTION, SOLUTION INTRAVENOUS; SUBCUTANEOUS at 12:42

## 2020-02-04 RX ADMIN — HEPARIN SODIUM 5000 UNITS: 5000 INJECTION INTRAVENOUS; SUBCUTANEOUS at 21:53

## 2020-02-04 RX ADMIN — LEVOTHYROXINE SODIUM 88 MCG: 88 TABLET ORAL at 05:38

## 2020-02-04 RX ADMIN — HEPARIN SODIUM 5000 UNITS: 5000 INJECTION INTRAVENOUS; SUBCUTANEOUS at 14:30

## 2020-02-04 RX ADMIN — MEXILETINE HYDROCHLORIDE 150 MG: 150 CAPSULE ORAL at 16:21

## 2020-02-04 RX ADMIN — LIDOCAINE 1 PATCH: 50 PATCH TOPICAL at 09:22

## 2020-02-04 RX ADMIN — FENTANYL 12 MCG: 12 PATCH, EXTENDED RELEASE TRANSDERMAL at 12:42

## 2020-02-04 RX ADMIN — PANTOPRAZOLE SODIUM 40 MG: 40 TABLET, DELAYED RELEASE ORAL at 05:38

## 2020-02-04 RX ADMIN — MIDODRINE HYDROCHLORIDE 10 MG: 5 TABLET ORAL at 12:42

## 2020-02-04 RX ADMIN — MIDODRINE HYDROCHLORIDE 10 MG: 5 TABLET ORAL at 01:45

## 2020-02-04 RX ADMIN — HEPARIN SODIUM 5000 UNITS: 5000 INJECTION INTRAVENOUS; SUBCUTANEOUS at 05:38

## 2020-02-04 RX ADMIN — MONTELUKAST SODIUM 10 MG: 10 TABLET, FILM COATED ORAL at 21:53

## 2020-02-04 RX ADMIN — IVABRADINE 5 MG: 5 TABLET, FILM COATED ORAL at 09:24

## 2020-02-04 RX ADMIN — OXYCODONE HYDROCHLORIDE 5 MG: 5 TABLET ORAL at 12:42

## 2020-02-04 RX ADMIN — GABAPENTIN 100 MG: 100 CAPSULE ORAL at 21:52

## 2020-02-04 RX ADMIN — POTASSIUM CHLORIDE 40 MEQ: 1500 TABLET, EXTENDED RELEASE ORAL at 09:22

## 2020-02-04 RX ADMIN — ASPIRIN 81 MG: 81 TABLET ORAL at 09:22

## 2020-02-04 RX ADMIN — MIDODRINE HYDROCHLORIDE 10 MG: 5 TABLET ORAL at 16:21

## 2020-02-04 NOTE — PROGRESS NOTES
ICU Transfer to [facility] 1965, 49 y o  female MRN: [id number]    Unit/Bed#: [facility] 516-01 Encounter: [id number]    Primary Care Provider: Florence Martinez MD   Date and time admitted to hospital: 1/29/2020 11:33 AM        Hepatorenal syndrome ([facility] [address] )  Assessment & Plan  -last day around the clock albumin today  -continue midodrine 10 t i d   -acute kidney injury has resolved      Palliative care patient  Assessment & Plan  -patient evaluating hospice options in [facility], she would like to in continue to be a full code with optimal medical treatment until her time of discharge  -continue opiate pain regimen  -patient would like to retain urinary catheter for comfort    Hyponatremia  Assessment & Plan  -likely secondary to SIADH, sodium has been stable, trend daily Nephrology appreciated  -did receive 1 dose of tolvaptan    Type 2 diabetes mellitus without complication, without long-term current use of insulin [facility])  Assessment & Plan  No results found for: HGBA1C    Recent Labs     02/03/20  1126 02/03/20  2041 02/04/20  0828 02/04/20  1103   POCGLU 146* 225* 109 209*       Blood Sugar Average: Last 72 hrs:  (P) 873 [id number]     -continue correctional insulin    Chronic systolic heart failure (HCC)  Assessment & Plan  Wt Readings from Last 3 Encounters:   02/04/20 75 kg (165 lb 5 5 oz)     -most recent EF 35%  -continue strict I&Os, daily weights  -patient euvolemic at time          Elevated LFTs  Assessment & Plan  -secondary to liver metastases, improved since admission, trend daily    Supratherapeutic INR  Assessment & Plan  -secondary to liver dysfunction from Mets  -continue to hold Coumadin, repeat INR in a m      Metastatic breast cancer ([facility] [address] )  Assessment & Plan  -Mets to liver and bone  -patient is currently being evaluated for possible hospice in [place]  -palliative care appreciated, continue pain regimen for cancer-related pain    Chronic atrial fibrillation  Assessment & Plan  -currently being atrially paced, hold Coumadin secondary to supratherapeutic INR    Ventricular tachycardia (HCC)  Assessment & Plan  -no episodes since admission, biventricular ICD in place  -continue home Corlanor and Mextil    * SOB (shortness of breath)  Assessment & Plan  -mild dyspnea with exertion likely 2/2 atelectasis and poor inspiratory effort 2/2 pain  -continue IS, wean to off as tolerated        Code Status: Level 1 - Full Code  POA:    POLST:      Reason for ICU admission:   Undifferentiated shock requiring vasopressors     Active problems:   Principal Problem:    SOB (shortness of breath)  Active Problems:    Ventricular tachycardia (HCC)    Chronic atrial fibrillation    Metastatic breast cancer (HCC)    Supratherapeutic INR    Elevated LFTs    Chronic systolic heart failure (HCC)    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[facility] [address] )    Type 2 diabetes mellitus without complication, without long-term current use of insulin (HCC)    Hyponatremia    Palliative care patient    Hepatorenal syndrome ([address] )  Resolved Problems:    * No resolved hospital problems  *      Consultants:   Nephrology  GI  Palliative Care    History of Present Illness:   Wendy Perkins is a 49 y o  female with a h/o metastatic breast Ca (to liver and bone)  She was admitted to [facility] with SOB, hyponatremia, SACHI, elevated LFTs and a supra-therapeutic INR       During admission, she had studies including an Echo showing an EF of 35% (up from 25), an abdominal CT showing liver mets and no biliary obstruction, and a RUQ U/S showing GB sludge and no biliary obstruction  Coagulopathy was treated with Vitamin K      She was seen by Oncology, Nephrology, GI and Palliative Medicine  Seems the general consensus for her treatment is Hospice       Today, she developed hypotension to 70s and was treated with IVF boluses  Her BP did not adequately respond and CCM was consulted   Patient transferred to ICU where an urgent CVC and arterial line were placed  Resuscitation with a combination of Albumin and crystalloid solutions was transiently effective  A Norepinephrine infusion was then started for BP control       Bedside Echo showed slightly dilated and diffusely hypokinetic LV with an under-filled RV       I saw patient in ICU  She was fatigued and c/o chronic low back pain  History obtained from chart review and the patient  Summary of clinical course:   Patient underwent central line placement and was started on Levophed for hemodynamic support  She was found have an SACHI and LFTs consistent with hepatorenal syndrome  She was also found to be hyponatremic, concern for SIADH  She was given 1 time dose of tolvaptan with improvement   She was started on round-the-clock albumin and midodrine as treatment for her paddle renal syndrome with improvement in her creatinine and blood pressure  She is able to be weaned off of all vasopressors  She was evaluated by GI, who believe that herpetic dysfunction secondary to liver metastasis, extremely poor prognosis no anticipated intervention  The patient was evaluated by palliative care and she has elected to pursue hospice options  She would like to pursue hospice in [facility] to be closer to family  Unfortunately, the patient is a [place] resident and has limited options for placement [place]  Palliative care and social work are actively looking  The patient like to remain a level 1 full code until time for discharge    She was hemodynamically stable, off vasopressors, and appropriate for transfer to the floor      Recent or scheduled procedures:   None    Outstanding/pending diagnostics:   None    Cultures:   Blood cultures negative at 24       Mobilization Plan:   Out of bed to chair, ambulate as tolerated     Nutrition Plan:   Regular diet    VTE Pharmacologic Prophylaxis: Heparin  VTE Mechanical Prophylaxis: sequential compression device    Discharge Plan:   Patient should be ready for discharge to hospice after evaluation and placement    Initial /Plan:  Looking for hospice options in [facility] with Dr Kaur Marking  regarding transfer  Please call [phone] with any questions or concerns  Portions of the record may have been created with voice recognition software  Occasional wrong word or "sound a like" substitutions may have occurred due to the inherent limitations of voice recognition software  Read the chart carefully and recognize, using context, where substitutions have occurred

## 2020-02-04 NOTE — ASSESSMENT & PLAN NOTE
-mild dyspnea with exertion likely 2/2 atelectasis and poor inspiratory effort 2/2 pain  -continue IS, wean to off as tolerated

## 2020-02-04 NOTE — ASSESSMENT & PLAN NOTE
-patient evaluating hospice options in [facility], she would like to in continue to be a full code with optimal medical treatment until her time of discharge  -continue opiate pain regimen  -patient would like to retain urinary catheter for comfort

## 2020-02-04 NOTE — PROGRESS NOTES
Progress note - Palliative and Supportive Care   Ada Cordero 49 y o  female [id number]    Assessment:   -   Patient Active Problem List   Diagnosis    SOB (shortness of breath)    Ventricular tachycardia (HCC)    Chronic atrial fibrillation    Metastatic breast cancer (HCC)    Supratherapeutic INR    Elevated LFTs    Chronic systolic heart failure ([facility] [address] )    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    Type 2 diabetes mellitus without complication, without long-term current use of insulin ([facility] )    Hyponatremia    Palliative care patient         Plan:  1  Symptom management -    - Cancer pain:    Start Duragesic 12 mcg patch    Continue PRN oxyIR and PRN Fentanyl    Continue Lidoderm    Continue Gabapentin HS, consider up-titration     2  Goals - Hospice in [place], patient wishes to remain Full Code until she moves  Interval history:       Patient still with back pain that is relieved with medications but does not last  She is off pressers  She is working on getting her affairs in order for hospice enrollment       MEDICATIONS / ALLERGIES:     all current active meds have been reviewed and current meds:   Current Facility-Administered Medications   Medication Dose Route Frequency    albumin human (FLEXBUMIN) 25 % injection 12 5 g  12 5 g Intravenous Q6H    aspirin (ECOTRIN LOW STRENGTH) EC tablet 81 mg  81 mg Oral Daily    fentaNYL (DURAGESIC) 12 mcg/hr TD 72 hr patch 12 mcg  12 mcg Transdermal Q72H    fentanyl citrate (PF) 100 MCG/2ML 25 mcg  25 mcg Intravenous Q2H PRN    gabapentin (NEURONTIN) capsule 100 mg  100 mg Oral HS    heparin (porcine) subcutaneous injection 5,000 Units  5,000 Units Subcutaneous Q8H [facility]    insulin lispro (HumaLOG) 100 units/mL subcutaneous injection 1-5 Units  1-5 Units Subcutaneous 4x Daily (AC & HS)    ivabradine HCl (CORLANOR) tablet 5 mg  5 mg Oral BID With Meals    levothyroxine tablet 88 mcg  88 mcg Oral Early Morning    lidocaine (LIDODERM) 5 % patch 1 patch  1 patch Topical Daily    lidocaine (LIDODERM) 5 % patch 1 patch  1 patch Topical Daily    mexiletine (MEXITIL) capsule 150 mg  150 mg Oral TID    midodrine (PROAMATINE) tablet 10 mg  10 mg Oral TID AC    montelukast (SINGULAIR) tablet 10 mg  10 mg Oral HS    ondansetron (ZOFRAN) injection 4 mg  4 mg Intravenous Q4H PRN    oxyCODONE (ROXICODONE) IR tablet 5 mg  5 mg Oral Q4H PRN    pantoprazole (PROTONIX) EC tablet 40 mg  40 mg Oral Early Morning    polyethylene glycol (MIRALAX) packet 17 g  17 g Oral Daily       Allergies   Allergen Reactions    Ambien [Zolpidem]     Entresto [Sacubitril-Valsartan] Other (See Comments)     Hypotension     Other      Other reaction(s): Unknown  Adhesive Tape    Bee Pollen Rash     Pollen       OBJECTIVE:    Physical Exam  Physical Exam   Constitutional: She is oriented to person, place, and time  No distress  HENT:   Head: Normocephalic and atraumatic  Right Ear: External ear normal    Left Ear: External ear normal    Eyes: Right eye exhibits no discharge  Left eye exhibits no discharge  Scleral icterus is present  Cardiovascular: Normal rate  Pulmonary/Chest: Effort normal  No respiratory distress  Abdominal: Soft  She exhibits no distension  Musculoskeletal: She exhibits no edema  Neurological: She is alert and oriented to person, place, and time  Skin: There is pallor  Psychiatric: She has a normal mood and affect  Nursing note and vitals reviewed  Lab Results:   I have personally reviewed pertinent labs  , CBC:   Lab Results   Component Value Date    WBC 11 05 (H) 02/04/2020    HGB 7 4 (L) 02/04/2020    HCT 22 4 (L) 02/04/2020    MCV 99 (H) 02/04/2020     02/04/2020    MCH 32 7 02/04/2020    MCHC 33 0 02/04/2020    RDW 18 8 (H) 02/04/2020    MPV 10 6 02/04/2020   , CMP:   Lab Results   Component Value Date    SODIUM 133 (L) 02/04/2020    K 3 7 02/04/2020     02/04/2020    CO2 27 02/04/2020    BUN 13 02/04/2020    CREATININE 0 78 02/04/2020    CALCIUM 9 9 02/04/2020     (H) 02/04/2020     (H) 02/04/2020    ALKPHOS 907 (H) 02/04/2020    EGFR 87 02/04/2020     Imaging Studies: reviewed  EKG, Pathology, and Other Studies: reviewed    Counseling / Coordination of Care  Total floor / unit time spent today 25+ minutes  Greater than 50% of total time was spent with the patient and / or family counseling and / or coordination of care  A description of the counseling / coordination of care: chart review, symptom assessment, medication review with changes, goals of care, supportive listening

## 2020-02-04 NOTE — PROGRESS NOTES
NEPHROLOGY PROGRESS NOTE   Lesia Espinoza 49 y o  female MRN: [id number]  Unit/Bed#: [facility] 516-01 Encounter: [id number]  Reason for Consult: SACHI    ASSESSMENT AND PLAN:  SACHI suspected to be multifactorial, likely prerenal in the setting of hypotension, blood loss anemia  -with inotropic support, blood transfusion, creatinine now significantly improving from peak level 2 4 to 0 7 today  -urine output good  -avoid nephrotoxins or NSAIDs  -avoid hypotension  -urinalysis bland without hematuria or proteinuria on 1/31/20  -urine sodium 55  -CT scan without contrast shows no hydronephrosis  -IV albumin to be discontinued later today   -okay from renal standpoint to discontinue Ferrera catheter     Hyponatremia  -suspected to be in the setting of malignancy/SIADH  -status post tolvaptan one dose with improvement in serum sodium, sodium level slightly dropped to 133 although relatively stable, continue to closely monitor       Metastatic breast cancer bone involvement  -as per palliative care note, patient is considering hospice care once she moves to [place]      Hypophosphatemia, serum phosphorus 2 2 status post phosphorus supplement yesterday  Check phosphorus level in a m        Hypotension, status post as a pressors now remains off  Blood pressure has overall improved  -currently getting IV albumin   -blood culture negative so far  -echo shows EF 32%, grade 1 diastolic dysfunction, no pericardial effusion      Discussed above plan in detail with ICU team   Patient will be hospice in [place], currently remains full code  SUBJECTIVE:  Patient seen and examined at bedside   No chest pain, shortness of breath, nausea, vomiting, abdominal pain    OBJECTIVE:  Current Weight: Weight - Scale: 75 kg (165 lb 5 5 oz)  Vitals:    02/04/20 1000   BP:    Pulse: 82   Resp: (!) 36   Temp:    SpO2: 94%       Intake/Output Summary (Last 24 hours) at 2/4/2020 1156  Last data filed at 2/4/2020 1001  Gross per 24 hour   Intake 1653 95 ml   Output 2890 ml   Net -1236 05 ml     Wt Readings from Last 3 Encounters:   02/04/20 75 kg (165 lb 5 5 oz)     Temp Readings from Last 3 Encounters:   02/04/20 99 3 °F (37 4 °C) (Oral)     BP Readings from Last 3 Encounters:   02/02/20 (!) 99/31     Pulse Readings from Last 3 Encounters:   02/04/20 82        Physical Examination:  General:  Sitting in chair, no acute distress   Eyes:  Mild conjunctival pallor present  ENT:  External examination of ears and nose unremarkable  Neck:  No obvious lymphadenopathy appreciated  Respiratory:  Bilateral air entry present  CVS:  S1, S2 present  GI:  Soft, nontender, nondistended  CNS:  Active alert oriented x3  Extremities:  No significant edema in legs  Skin:  No new rash in legs    Medications:    Current Facility-Administered Medications:     albumin human (FLEXBUMIN) 25 % injection 12 5 g, 12 5 g, Intravenous, Q6H, Waldemar Riedel, PA-C, Stopped at 02/04/20 1001    aspirin (ECOTRIN LOW STRENGTH) EC tablet 81 mg, 81 mg, Oral, Daily, Nate Henao PA-C, 81 mg at 02/04/20 2606    fentaNYL (DURAGESIC) 12 mcg/hr TD 72 hr patch 12 mcg, 12 mcg, Transdermal, Q72H, Kika Boles DO    fentanyl citrate (PF) 100 MCG/2ML 25 mcg, 25 mcg, Intravenous, Q2H PRN, Nate Henao PA-C, 25 mcg at 02/03/20 1715    gabapentin (NEURONTIN) capsule 100 mg, 100 mg, Oral, HS, Nate Henao PA-C, 100 mg at 02/03/20 2147    heparin (porcine) subcutaneous injection 5,000 Units, 5,000 Units, Subcutaneous, Q8H [facility], Nate Henao PA-C, 5,000 Units at 02/04/20 0538    insulin lispro (HumaLOG) 100 units/mL subcutaneous injection 1-5 Units, 1-5 Units, Subcutaneous, 4x Daily (AC & HS), 2 Units at 02/03/20 2147 **AND** Fingerstick Glucose (POCT), , , 4x Daily AC and at bedtime, Nate Henao PA-C    ivabradine HCl (CORLANOR) tablet 5 mg, 5 mg, Oral, BID With Meals, Nate Henao PA-C, 5 mg at 02/04/20 0924    levothyroxine tablet 88 mcg, 88 mcg, Oral, Early Morning, Olgawhitney Simmons, PA-C, 88 mcg at 02/04/20 0538    lidocaine (LIDODERM) 5 % patch 1 patch, 1 patch, Topical, Daily, Olga Borders, PA-C, 1 patch at 02/04/20 7573    lidocaine (LIDODERM) 5 % patch 1 patch, 1 patch, Topical, Daily, SILVANA EscamillaC    mexiletine (MEXITIL) capsule 150 mg, 150 mg, Oral, TID, Olga Iris, PA-C, 150 mg at 02/04/20 8715    midodrine (PROAMATINE) tablet 10 mg, 10 mg, Oral, TID AC, Nahomi De La Torre, PA-C, 10 mg at 02/04/20 6315    montelukast (SINGULAIR) tablet 10 mg, 10 mg, Oral, HS, Olga Iris, PA-C, 10 mg at 02/03/20 2147    ondansetron (ZOFRAN) injection 4 mg, 4 mg, Intravenous, Q4H PRN, Olga Simmons, PA-C, 4 mg at 02/03/20 1930    oxyCODONE (ROXICODONE) IR tablet 5 mg, 5 mg, Oral, Q4H PRN, Olga Iris, PA-C, 5 mg at 02/03/20 2157    pantoprazole (PROTONIX) EC tablet 40 mg, 40 mg, Oral, Early Morning, Olgawhitney Simmons, PA-C, 40 mg at 02/04/20 0538    polyethylene glycol (MIRALAX) packet 17 g, 17 g, Oral, Daily, Olgawhitney Simmons, PA-C, 17 g at 02/02/20 6747    Laboratory Results:  Results from last 7 days   Lab Units 02/04/20  0543 02/03/20  0538 02/02/20  1122 02/02/20  0453 02/01/20  0545 01/31/20  0524 01/30/20  0444 01/29/20  1150   WBC Thousand/uL 11 05* 15 72*  --  18 50* 16 13* 12 50*  --  22 09*   HEMOGLOBIN g/dL 7 4* 7 8* 7 9* 6 8* 7 9* 8 0*  --  10 2*   HEMATOCRIT % 22 4* 23 6*  --  20 3* 24 7* 25 2*  --  30 8*   PLATELETS Thousands/uL 181 205  --  212 241 273  --  328   SODIUM mmol/L 133* 134*  --  130* 125* 126* 128* 132*   POTASSIUM mmol/L 3 7 3 9  --  4 7 5 2 4 7 4 3 5 1   CHLORIDE mmol/L 101 103  --  100 93* 93* 95* 99*   CO2 mmol/L 27 23  --  22 23 25 26 26   BUN mg/dL 13 23  --  47* 61* 44* 26* 25   CREATININE mg/dL 0 78 1 04  --  1 71* 2 43* 1 84* 0 89 1 11   CALCIUM mg/dL 9 9 9 6  --  9 1 8 9 8 9 9 3 9 9   MAGNESIUM mg/dL 1 6 1 6  --   --   --  1 9  --   --    PHOSPHORUS mg/dL  --  2 2*  --   --   --   --   --   -- XR chest portable   Final Result by Abhishek Parrish MD (02/02 1220)      No acute cardiopulmonary disease  Right IJ central line has been placed with tip at the cavoatrial junction  No pneumothorax  Workstation performed: [id number]         CT abdomen pelvis wo contrast   Final Result by Yahaira Saldana MD (01/31 1014)      Redemonstration of diffuse metastatic hepatic disease  Diffuse osseous metastatic disease  No pathologic fractures are identified  Cholelithiasis without CT evidence for acute cholecystitis  Workstation performed: [id number]         US right upper quadrant   Final Result by Abhishek Parrish MD (01/30 1549)      Moderate hepatomegaly, with diffusely heterogeneous nodular liver parenchyma in keeping with history of widespread metastatic disease  There is no evidence of biliary ductal dilatation  There is sludge and small gallstone within the gallbladder, without evidence of acute cholecystitis  Workstation performed: [id number]         XR chest 1 view portable   ED Interpretation by Jessica Hopkins DO (01/29 1316)   No acute cardiopulmonary disease      Final Result by Son Mills MD (01/29 1353)   Typical left-sided ICD      No acute cardiopulmonary disease  Findings are consistent with emergency provider's preliminary reading                     Workstation performed: [id number]             Portions of the record may have been created with voice recognition software  Occasional wrong word or "sound a like" substitutions may have occurred due to the inherent limitations of voice recognition software  Read the chart carefully and recognize, using context, where substitutions have occurred

## 2020-02-04 NOTE — SOCIAL WORK
LSW met with patient and friend Brook Chen to follow-up from yesterday's conversation  Patient is focused on working on paperwork/bills with her friends  Patient does not want to discuss code or 5 wishes at this time  Patient will inform the SW when she is ready to have this conversation  Patient would like to remain full code prior to getting to [place] for hospice  Patient's main goal is to get to [place], where her family is close and can visit often and easily  LSW will continue to follow as requested by patient, family, and medical team   Counseling / Coordination of Care  Total floor / unit time spent today 20 minutes  Greater than 50% of total time was spent with the patient and / or family counseling and / or coordination of care   A description of the counseling / coordination of care: support

## 2020-02-04 NOTE — QUICK NOTE
Progress Note - Palliative & Supportive Care     Patient's cardiac medication- Corlanor- is currently in our pharmacy as it is not on formulary  She requests to know if any refills are on this medication  Spoke with pharmacy, no refills, she has 4 tablets left  Patient will her [facility] cardiologist for a refill      Zane Chaudhry DO  Palliative and Supportive Care  [phone]

## 2020-02-04 NOTE — ASSESSMENT & PLAN NOTE
-likely secondary to SIADH, sodium has been stable, trend daily Nephrology appreciated  -did receive 1 dose of tolvaptan

## 2020-02-04 NOTE — ASSESSMENT & PLAN NOTE
-last day around the clock albumin today  -continue midodrine 10 t i d   -acute kidney injury has resolved

## 2020-02-04 NOTE — ASSESSMENT & PLAN NOTE
-Mets to liver and bone  -patient is currently being evaluated for possible hospice in [place]  -palliative care appreciated, continue pain regimen for cancer-related pain

## 2020-02-04 NOTE — ASSESSMENT & PLAN NOTE
No results found for: HGBA1C    Recent Labs     02/03/20  1126 02/03/20  2041 02/04/20  0828 02/04/20  1103   POCGLU 146* 225* 109 209*       Blood Sugar Average: Last 72 hrs:  (P) 519 [id number]     -continue correctional insulin

## 2020-02-04 NOTE — PROGRESS NOTES
Daily Progress Note - Critical Care   Garcia Page 49 y o  female MRN: [id number]  Unit/Bed#: [facility] 516-01 Encounter: [id number]        ----------------------------------------------------------------------------------------  HPI/24hr events: 48 y/o female with PMHx metastatic breast Ca who presents with multifactorial hypotension, acute on chronic systolic heart failure EF 35%, and hepatorenal syndrome  Family meeting yesterday, would like to pursue hospice, trying to coordinate to return to [place]  Patient full code at this time until she makes it to [place] to see additional family  Started on midodrine last PM, able to come off of levophed      ---------------------------------------------------------------------------------------  SUBJECTIVE    Review of Systems   Respiratory: Positive for shortness of breath  Musculoskeletal: Positive for back pain       Review of systems was reviewed and negative unless stated above in HPI/24-hour events   ---------------------------------------------------------------------------------------  Assessment and Plan:    Neuro:    Analgesia:PRN oxycodone, fentanyl   o Has back pain from metastatic Ca, appreciate palliative recs  o lidoderm patch, gabapentin QHS   Sedation: none    Delirium PPX: CAM-ICU, Sleep Hygiene       CV:    Shock: continue midodrine 10 TID, levophed and vasopressin off   o D/c art line   o Multifactorial likely 2/2 below and hepatorenal syndrome      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%: likely 2/2 chemotherapy, supportive care as above   Hx Ventricular Tachycardia: Bi-V ICD in place   o Continue mexitil , chorlanor    Chronic AF: currently being AV paced  o Continue ASA, coumadin on hold 2/2 supra-therapeutic INR      Pulm:   No active issues   IS      GI:    Elevated LFTs 2/2 liver metastasis: no biliary obstruction on imaging  o Not amenable to intervention per GI  o LFT downtrending, continue to trend daily  o Daily INR    Severe protein calorie malnutrition: diet with calorie supplementation        :    SACHI: resolved, 2/2 below  o Making adequate urine, d/c rogerio    Hepatorenal Syndrome: continue round the clock albumin, midodrine, start octreotide       F/E/N:    Electrolytes - Monitor/trend - replete based on deficiencies    SIADH: multifactorial in setting of cancer, liver, and heart failure      Heme/Onc:    Anemia: 2/2 cancer, stable    DVT PPX: SQH, SCDs      Endo:    Diabetes: SSI   Hypothyroidism: levothyroxine      ID:    No active issues       MSK/Skin:    Turn frequently and reposition       Disposition: Transfer to Stepdown Level 2  Code Status: Level 1 - Full Code  ---------------------------------------------------------------------------------------  ICU CORE MEASURES    Prophylaxis   VTE Pharmacologic Prophylaxis: Heparin  VTE Mechanical Prophylaxis: sequential compression device  Stress Ulcer Prophylaxis: Prophylaxis Not Indicated     Invasive Devices Review  Invasive Devices     Central Venous Catheter Line            CVC Central Lines 02/01/20 Triple Right Internal jugular 2 days          Arterial Line            Arterial Line 02/01/20 Left Radial 2 days          Drain            Urethral Catheter Temperature probe 14 Fr  2 days              Can any invasive devices be discontinued today? Not applicable  ---------------------------------------------------------------------------------------  OBJECTIVE    Physical Exam   Constitutional: She is oriented to person, place, and time  She appears well-developed and well-nourished  HENT:   Head: Normocephalic and atraumatic  Eyes: Pupils are equal, round, and reactive to light  EOM are normal    Neck: Normal range of motion  No JVD present  No tracheal deviation present  Cardiovascular: Normal rate, regular rhythm and normal heart sounds  Pulmonary/Chest: Effort normal and breath sounds normal  No respiratory distress  Abdominal: Soft  She exhibits distension  There is no tenderness  Musculoskeletal: Normal range of motion  She exhibits edema (1+)  Neurological: She is alert and oriented to person, place, and time  No cranial nerve deficit  Skin: Skin is dry  Capillary refill takes less than 2 seconds  Vitals   Vitals:    20 0400 20 0600 20 0610 20 0700   BP:       BP Location:       Pulse: 76  76    Resp: 19  22    Temp:    99 3 °F (37 4 °C)   TempSrc:    Oral   SpO2: 97%  98%    Weight:  75 kg (165 lb 5 5 oz)     Height:         Temp (24hrs), Av 3 °F (36 8 °C), Min:97 5 °F (36 4 °C), Max:99 3 °F (37 4 °C)  Current: Temperature: 99 3 °F (37 4 °C)      Invasive/non-invasive ventilation settings   Respiratory    Lab Data (Last 4 hours)    None         O2/Vent Data (Last 4 hours)    None                Height and Weights   Height: 5' 8" (172 7 cm)  IBW: 63 9 kg  Body mass index is 25 14 kg/m²  Weight (last 2 days)     Date/Time   Weight    20 0600   75 (165 35)    20 0600   76 8 (169 31) with aqua K pad    Weight: with aqua K pad at 20 0600                Intake and Output  I/O        0701 -  0700  07 -  0700 / 0701 -  0700    P  O  1521 970     I V  (mL/kg) 326 (4 2) 459 2 (6 1)     Blood 350      IV Piggyback 150 340     Total Intake(mL/kg) 2347 (30 6) 1769 2 (23 6)     Urine (mL/kg/hr) 2218 (1 2) 3050 (1 7)     Emesis/NG output  0     Stool 0 0     Total Output 2218 3050     Net +129 -1280 9            Unmeasured Stool Occurrence 1 x 0 x         UOP: 75 ml/hr     Nutrition       Diet Orders   (From admission, onward)             Start     Ordered    20 1442  Diet Regular; Regular House; Fluid Restriction 1500 ML  Diet effective now     Question Answer Comment   Diet Type Regular    Regular Regular House    Other Restriction(s): Fluid Restriction 1500 ML    RD to adjust diet per protocol?  Yes        20 1445                Laboratory and Diagnostics:  Results from last 7 days   Lab Units 02/04/20  0543 02/03/20  0538 02/02/20  1122 02/02/20  0453 02/01/20  0545 01/31/20  0524 01/29/20  1150   WBC Thousand/uL 11 05* 15 72*  --  18 50* 16 13* 12 50* 22 09*   HEMOGLOBIN g/dL 7 4* 7 8* 7 9* 6 8* 7 9* 8 0* 10 2*   HEMATOCRIT % 22 4* 23 6*  --  20 3* 24 7* 25 2* 30 8*   PLATELETS Thousands/uL 181 205  --  212 241 273 328   NEUTROS PCT %  --  81*  --   --   --   --   --    BANDS PCT %  --   --   --  1  --  2 2   MONOS PCT %  --  10  --   --   --   --   --    MONO PCT %  --   --   --  4 6 7 10     Results from last 7 days   Lab Units 02/04/20  0543 02/03/20  0538 02/02/20  0453 02/01/20  0545 01/31/20  0524 01/30/20  0444 01/29/20  1150   SODIUM mmol/L 133* 134* 130* 125* 126* 128* 132*   POTASSIUM mmol/L 3 7 3 9 4 7 5 2 4 7 4 3 5 1   CHLORIDE mmol/L 101 103 100 93* 93* 95* 99*   CO2 mmol/L 27 23 22 23 25 26 26   ANION GAP mmol/L 5 8 8 9 8 7 7   BUN mg/dL 13 23 47* 61* 44* 26* 25   CREATININE mg/dL 0 78 1 04 1 71* 2 43* 1 84* 0 89 1 11   CALCIUM mg/dL 9 9 9 6 9 1 8 9 8 9 9 3 9 9   GLUCOSE RANDOM mg/dL 120 127 138 92 177* 136 173*   ALT U/L 101* 131* 200* 328* 417* 174* 205*   AST U/L 148* 224* 508* 1,100* 1,255* 469* 519*   ALK PHOS U/L 907* 1,010* 1,094* 1,414* 1,248* 995* 1,165*   ALBUMIN g/dL 2 4* 2 4* 2 3* 1 8* 1 9* 1 9* 2 2*   TOTAL BILIRUBIN mg/dL 6 18* 6 95* 6 03* 5 02* 4 55* 5 00* 4 11*     Results from last 7 days   Lab Units 02/04/20  0543 02/03/20  0538 01/31/20  0524   MAGNESIUM mg/dL 1 6 1 6 1 9   PHOSPHORUS mg/dL  --  2 2*  --       Results from last 7 days   Lab Units 02/03/20  0538 02/02/20  0453 01/31/20  0524 01/30/20  0444 01/29/20  2125 01/29/20  1154   INR  1 79* 2 51* 1 73* 1 54* 2 16* >15 00*   PTT seconds  --   --   --   --   --  89*      Results from last 7 days   Lab Units 01/29/20  1150   TROPONIN I ng/mL <0 02         ABG:    VBG:  Results from last 7 days   Lab Units 02/02/20  0622   PH DARIUS  7 374   PCO2 DARIUS mm Hg 36 3*   PO2 DARIUS mm Hg 33 8*   HCO3 DARIUS mmol/L 20 7*   BASE EXC DARIUS mmol/L -4 1           Micro  Results from last 7 days   Lab Units 02/01/20  0546   BLOOD CULTURE  No Growth at 48 hrs  No Growth at 48 hrs         EKG: NSR      Active Medications  Scheduled Meds:  Current Facility-Administered Medications:  albumin human 12 5 g Intravenous Q6H Almaz Henderson PA-C Last Rate: Stopped (02/04/20 0220)   aspirin 81 mg Oral Daily Elyse Mary PA-C    fentanyl citrate (PF) 25 mcg Intravenous Q2H PRN Elyse Mary PA-C    gabapentin 100 mg Oral HS Elyse Mary PA-C    heparin (porcine) 5,000 Units Subcutaneous Pending sale to [facility] Elyse Mary PA-C    insulin lispro 1-5 Units Subcutaneous 4x Daily (AC & HS) Elyse Mary PA-C    ivabradine HCl 5 mg Oral BID With Meals Elyse Mary PA-C    levothyroxine 88 mcg Oral Early Morning Elyse Mary PA-C    lidocaine 1 patch Topical Daily Elyse Mary PA-C    mexiletine 150 mg Oral TID Elyse Mary PA-C    midodrine 10 mg Oral TID AC Nahomi De La Torre PA-C    montelukast 10 mg Oral HS Elyse Mary PA-C    norepinephrine 1-30 mcg/min Intravenous Titrated Almaz Henderson PA-C Last Rate: Stopped (02/04/20 0355)   ondansetron 4 mg Intravenous Q4H PRN Elyse Mary PA-C    oxyCODONE 5 mg Oral Q4H PRN Elyse Mary PA-C    pantoprazole 40 mg Oral Early Morning Elyse Mary PA-C    polyethylene glycol 17 g Oral Daily Elyse Mary PA-C    vasopressin (PITRESSIN) in 0 9 % sodium chloride 100 mL 0 04 Units/min Intravenous Continuous Almaz Henderson PA-C Last Rate: Stopped (02/02/20 2230)     Continuous Infusions:    norepinephrine 1-30 mcg/min Last Rate: Stopped (02/04/20 0355)   vasopressin (PITRESSIN) in 0 9 % sodium chloride 100 mL 0 04 Units/min Last Rate: Stopped (02/02/20 2230)     PRN Meds:     fentanyl citrate (PF) 25 mcg Q2H PRN   ondansetron 4 mg Q4H PRN   oxyCODONE 5 mg Q4H PRN       Allergies   Allergies   Allergen Reactions    Ambien [Zolpidem]    Lenny Burns [Sacubitril-Valsartan] Other (See Comments)     Hypotension     Other      Other reaction(s): Unknown  Adhesive Tape    Bee Pollen Rash     Pollen       Advance Directive and Living Will:      Power of :    POLST:        Counseling / Coordination of Care  Total Critical Care time spent 0 minutes excluding procedures, teaching and family updates  Spencer Portillo PA-C        Portions of the record may have been created with voice recognition software  Occasional wrong word or "sound a like" substitutions may have occurred due to the inherent limitations of voice recognition software    Read the chart carefully and recognize, using context, where substitutions have occurred

## 2020-02-04 NOTE — ASSESSMENT & PLAN NOTE
Wt Readings from Last 3 Encounters:   02/04/20 75 kg (165 lb 5 5 oz)     -most recent EF 35%  -continue strict I&Os, daily weights  -patient euvolemic at time

## 2020-02-05 PROBLEM — R79.1 SUPRATHERAPEUTIC INR: Status: RESOLVED | Noted: 2020-01-29 | Resolved: 2020-02-05

## 2020-02-05 LAB
ALBUMIN SERPL BCP-MCNC: 2.1 G/DL (ref 3.5–5)
ALP SERPL-CCNC: 847 U/L (ref 46–116)
ALT SERPL W P-5'-P-CCNC: 89 U/L (ref 12–78)
ANION GAP SERPL CALCULATED.3IONS-SCNC: 5 MMOL/L (ref 4–13)
AST SERPL W P-5'-P-CCNC: 131 U/L (ref 5–45)
BILIRUB SERPL-MCNC: 6.19 MG/DL (ref 0.2–1)
BUN SERPL-MCNC: 13 MG/DL (ref 5–25)
CALCIUM SERPL-MCNC: 9.9 MG/DL (ref 8.3–10.1)
CHLORIDE SERPL-SCNC: 102 MMOL/L (ref 100–108)
CO2 SERPL-SCNC: 28 MMOL/L (ref 21–32)
CREAT SERPL-MCNC: 0.7 MG/DL (ref 0.6–1.3)
ERYTHROCYTE [DISTWIDTH] IN BLOOD BY AUTOMATED COUNT: 18.5 % (ref 11.6–15.1)
GFR SERPL CREATININE-BSD FRML MDRD: 99 ML/MIN/1.73SQ M
GLUCOSE SERPL-MCNC: 109 MG/DL (ref 65–140)
GLUCOSE SERPL-MCNC: 124 MG/DL (ref 65–140)
GLUCOSE SERPL-MCNC: 136 MG/DL (ref 65–140)
GLUCOSE SERPL-MCNC: 158 MG/DL (ref 65–140)
GLUCOSE SERPL-MCNC: 175 MG/DL (ref 65–140)
HCT VFR BLD AUTO: 23.1 % (ref 34.8–46.1)
HGB BLD-MCNC: 7.3 G/DL (ref 11.5–15.4)
INR PPP: 1.29 (ref 0.84–1.19)
MAGNESIUM SERPL-MCNC: 1.5 MG/DL (ref 1.6–2.6)
MCH RBC QN AUTO: 32.4 PG (ref 26.8–34.3)
MCHC RBC AUTO-ENTMCNC: 31.6 G/DL (ref 31.4–37.4)
MCV RBC AUTO: 103 FL (ref 82–98)
PHOSPHATE SERPL-MCNC: 3.6 MG/DL (ref 2.7–4.5)
PLATELET # BLD AUTO: 165 THOUSANDS/UL (ref 149–390)
PMV BLD AUTO: 10.5 FL (ref 8.9–12.7)
POTASSIUM SERPL-SCNC: 4.1 MMOL/L (ref 3.5–5.3)
PROT SERPL-MCNC: 5.8 G/DL (ref 6.4–8.2)
PROTHROMBIN TIME: 15.7 SECONDS (ref 11.6–14.5)
RBC # BLD AUTO: 2.25 MILLION/UL (ref 3.81–5.12)
SODIUM SERPL-SCNC: 135 MMOL/L (ref 136–145)
WBC # BLD AUTO: 8.93 THOUSAND/UL (ref 4.31–10.16)

## 2020-02-05 PROCEDURE — 84100 ASSAY OF PHOSPHORUS: CPT | Performed by: PHYSICIAN ASSISTANT

## 2020-02-05 PROCEDURE — 99232 SBSQ HOSP IP/OBS MODERATE 35: CPT | Performed by: INTERNAL MEDICINE

## 2020-02-05 PROCEDURE — 85610 PROTHROMBIN TIME: CPT | Performed by: PHYSICIAN ASSISTANT

## 2020-02-05 PROCEDURE — 80053 COMPREHEN METABOLIC PANEL: CPT | Performed by: PHYSICIAN ASSISTANT

## 2020-02-05 PROCEDURE — 83735 ASSAY OF MAGNESIUM: CPT | Performed by: PHYSICIAN ASSISTANT

## 2020-02-05 PROCEDURE — 85027 COMPLETE CBC AUTOMATED: CPT | Performed by: PHYSICIAN ASSISTANT

## 2020-02-05 PROCEDURE — 82948 REAGENT STRIP/BLOOD GLUCOSE: CPT

## 2020-02-05 RX ORDER — MAGNESIUM SULFATE HEPTAHYDRATE 40 MG/ML
2 INJECTION, SOLUTION INTRAVENOUS ONCE
Status: COMPLETED | OUTPATIENT
Start: 2020-02-05 | End: 2020-02-05

## 2020-02-05 RX ORDER — WARFARIN SODIUM 2.5 MG/1
2.5 TABLET ORAL
Status: DISCONTINUED | OUTPATIENT
Start: 2020-02-05 | End: 2020-02-09

## 2020-02-05 RX ADMIN — LIDOCAINE 1 PATCH: 50 PATCH TOPICAL at 09:10

## 2020-02-05 RX ADMIN — MIDODRINE HYDROCHLORIDE 10 MG: 5 TABLET ORAL at 17:00

## 2020-02-05 RX ADMIN — INSULIN LISPRO 1 UNITS: 100 INJECTION, SOLUTION INTRAVENOUS; SUBCUTANEOUS at 21:29

## 2020-02-05 RX ADMIN — IVABRADINE 5 MG: 5 TABLET, FILM COATED ORAL at 17:00

## 2020-02-05 RX ADMIN — LEVOTHYROXINE SODIUM 88 MCG: 88 TABLET ORAL at 06:05

## 2020-02-05 RX ADMIN — MIDODRINE HYDROCHLORIDE 10 MG: 5 TABLET ORAL at 06:05

## 2020-02-05 RX ADMIN — OXYCODONE HYDROCHLORIDE 5 MG: 5 TABLET ORAL at 22:53

## 2020-02-05 RX ADMIN — MIDODRINE HYDROCHLORIDE 10 MG: 5 TABLET ORAL at 11:35

## 2020-02-05 RX ADMIN — GABAPENTIN 100 MG: 100 CAPSULE ORAL at 22:46

## 2020-02-05 RX ADMIN — HEPARIN SODIUM 5000 UNITS: 5000 INJECTION INTRAVENOUS; SUBCUTANEOUS at 13:16

## 2020-02-05 RX ADMIN — ASPIRIN 81 MG: 81 TABLET ORAL at 09:08

## 2020-02-05 RX ADMIN — IVABRADINE 5 MG: 5 TABLET, FILM COATED ORAL at 09:08

## 2020-02-05 RX ADMIN — ONDANSETRON 4 MG: 2 INJECTION INTRAMUSCULAR; INTRAVENOUS at 21:25

## 2020-02-05 RX ADMIN — MEXILETINE HYDROCHLORIDE 150 MG: 150 CAPSULE ORAL at 17:00

## 2020-02-05 RX ADMIN — MAGNESIUM SULFATE HEPTAHYDRATE 2 G: 40 INJECTION, SOLUTION INTRAVENOUS at 15:00

## 2020-02-05 RX ADMIN — MEXILETINE HYDROCHLORIDE 150 MG: 150 CAPSULE ORAL at 22:46

## 2020-02-05 RX ADMIN — MONTELUKAST SODIUM 10 MG: 10 TABLET, FILM COATED ORAL at 22:46

## 2020-02-05 RX ADMIN — PANTOPRAZOLE SODIUM 40 MG: 40 TABLET, DELAYED RELEASE ORAL at 06:05

## 2020-02-05 RX ADMIN — MEXILETINE HYDROCHLORIDE 150 MG: 150 CAPSULE ORAL at 09:08

## 2020-02-05 RX ADMIN — INSULIN LISPRO 1 UNITS: 100 INJECTION, SOLUTION INTRAVENOUS; SUBCUTANEOUS at 11:37

## 2020-02-05 RX ADMIN — POLYETHYLENE GLYCOL 3350 17 G: 17 POWDER, FOR SOLUTION ORAL at 09:09

## 2020-02-05 RX ADMIN — WARFARIN SODIUM 2.5 MG: 2.5 TABLET ORAL at 17:23

## 2020-02-05 RX ADMIN — HEPARIN SODIUM 5000 UNITS: 5000 INJECTION INTRAVENOUS; SUBCUTANEOUS at 21:29

## 2020-02-05 RX ADMIN — HEPARIN SODIUM 5000 UNITS: 5000 INJECTION INTRAVENOUS; SUBCUTANEOUS at 06:06

## 2020-02-05 NOTE — SOCIAL WORK
Met with patient and her friend, George Ramirez  Discussed status of referrals and patient would prefer [facility] if accepted there  Completed the [address] form with the patient   Sent the form via [employer] to [employer]

## 2020-02-05 NOTE — PROGRESS NOTES
Progress Note - Hellen Heck 1965, 49 y o  female MRN: [id number]    Unit/Bed#: [facility] 516-01 Encounter: [id number]    Primary Care Provider: Luisito Hull MD   Date and time admitted to hospital: 1/29/2020 11:33 AM        Hepatorenal syndrome [facility])  Assessment & Plan  Improved, continue midodrine  Monitor BMP    Hyponatremia  Assessment & Plan  Continues to improve    Monitor on fluid restriction    Type 2 diabetes mellitus without complication, without long-term current use of insulin [facility])  Assessment & Plan  No results found for: HGBA1C    Recent Labs     02/04/20 2029 02/05/20  0616 02/05/20  1107 02/05/20  1600   POCGLU 153* 124 175* 136       Blood Sugar Average: Last 72 hrs:  (P) 168 [id number]  SSI, accuchecks    Chronic systolic heart failure (HCC)  Assessment & Plan  Wt Readings from Last 3 Encounters:   02/05/20 73 kg (160 lb 15 oz)     EF 36%  Stable at this time without shortness of breath  Monitor daily weights  Monitor volume status    Elevated LFTs  Assessment & Plan  Likely related to worsening metastasis liver disease  Jaundice  Liver ultrasound with widespread metastatic disease  Abdominal CT scan with pneumobilia  Evaluated by GI, no biliary obstruction  Continue supportive care        Metastatic breast cancer ([facility] [address] )  Assessment & Plan  With metastasis to bone and liver, ongoing low back pain  Evaluated by Oncology, recommendation for follow-up with her oncologist  Poor prognosis  Palliative Care is following  Patient currently like to continue with full code and with above treatment    Chronic atrial fibrillation  Assessment & Plan  Rate controlled  Continue corlanor  Digoxin held  Restart warfarin at 2 5 mg  Monitor INR    Ventricular tachycardia (HCC)  Assessment & Plan  No arrhythmia on ICD interrogation  Stable on corelanor      * SOB (shortness of breath)  Assessment & Plan  · Patient states that prior shortness of breath has been associated with V-tach in the past, as per EMS she had 5 beat run of V-tach  · Chest x-ray shows no acute cardiopulmonary pathology  · Cardiac echo with EF 36%  · Evaluated by Cardiology, no arrhythmia on ICD interrogation, no sign of CHF  · Resolved          VTE Pharmacologic Prophylaxis:   Pharmacologic: Heparin  Mechanical VTE Prophylaxis in Place: Yes    Patient Centered Rounds: I have performed bedside rounds with nursing staff today  Education and Discussions with Family / Patient:  Patient, plan of care    Time Spent for Care: 20 minutes  More than 50% of total time spent on counseling and coordination of care as described above  Current Length of Stay: 7 day(s)    Current Patient Status: Inpatient   Certification Statement: The patient will continue to require additional inpatient hospital stay due to Discharge Plan    [place]    Code Status: Level 1 - Full Code      Subjective:   Denies any acute complaints stating that her chronic pain is well controlled on the current regimen  Also denies shortness of breath  Objective:     Vitals:   Temp (24hrs), Av °F (36 7 °C), Min:97 6 °F (36 4 °C), Max:98 7 °F (37 1 °C)    Temp:  [97 6 °F (36 4 °C)-98 7 °F (37 1 °C)] 97 6 °F (36 4 °C)  HR:  [74-84] 74  Resp:  [15-34] 25  BP: (109-118)/(44-49) 118/44  SpO2:  [95 %-97 %] 95 %  Body mass index is 24 47 kg/m²  Input and Output Summary (last 24 hours): Intake/Output Summary (Last 24 hours) at 2020 1721  Last data filed at 2020 1700  Gross per 24 hour   Intake 680 ml   Output 2475 ml   Net -1795 ml       Physical Exam:     Physical Exam   Constitutional: She is oriented to person, place, and time  She appears well-developed and well-nourished  HENT:   Head: Normocephalic and atraumatic  Eyes: Pupils are equal, round, and reactive to light  EOM are normal  Scleral icterus is present  Neck: Neck supple  Cardiovascular: Normal rate and regular rhythm     Pulmonary/Chest: Effort normal  Abdominal: Soft  She exhibits no distension  Musculoskeletal: She exhibits no edema  Neurological: She is alert and oriented to person, place, and time  Skin: Skin is warm and dry  Psychiatric: She has a normal mood and affect  Her behavior is normal        Additional Data:     Labs:    Results from last 7 days   Lab Units 02/05/20  0612  02/03/20  0538  02/02/20  0453   WBC Thousand/uL 8 93   < > 15 72*  --  18 50*   HEMOGLOBIN g/dL 7 3*   < > 7 8*   < > 6 8*   HEMATOCRIT % 23 1*   < > 23 6*  --  20 3*   PLATELETS Thousands/uL 165   < > 205  --  212   BANDS PCT %  --   --   --   --  1   NEUTROS PCT %  --   --  81*  --   --    LYMPHS PCT %  --   --  7*  --   --    LYMPHO PCT %  --   --   --   --  5*   MONOS PCT %  --   --  10  --   --    MONO PCT %  --   --   --   --  4   EOS PCT %  --   --  0  --  2    < > = values in this interval not displayed  Results from last 7 days   Lab Units 02/05/20  0612   SODIUM mmol/L 135*   POTASSIUM mmol/L 4 1   CHLORIDE mmol/L 102   CO2 mmol/L 28   BUN mg/dL 13   CREATININE mg/dL 0 70   ANION GAP mmol/L 5   CALCIUM mg/dL 9 9   ALBUMIN g/dL 2 1*   TOTAL BILIRUBIN mg/dL 6 19*   ALK PHOS U/L 847*   ALT U/L 89*   AST U/L 131*   GLUCOSE RANDOM mg/dL 109     Results from last 7 days   Lab Units 02/05/20  0612   INR  1 29*     Results from last 7 days   Lab Units 02/05/20  1600 02/05/20  1107 02/05/20  0616 02/04/20 2029 02/04/20  1629 02/04/20  1103 02/04/20  0828 02/03/20  2041 02/03/20  1126 02/03/20  0646 02/02/20 2040 02/02/20  1708   POC GLUCOSE mg/dl 136 175* 124 153* 146* 209* 109 225* 146* 134 239* 193*                   * I Have Reviewed All Lab Data Listed Above  * Additional Pertinent Lab Tests Reviewed: All Labs Within Last 24 Hours Reviewed        Recent Cultures (last 7 days):     Results from last 7 days   Lab Units 02/01/20  0546   BLOOD CULTURE  No Growth After 4 Days  No Growth After 4 Days         Last 24 Hours Medication List:     Current Facility-Administered Medications:  aspirin 81 mg Oral Daily Masha Shine PA-C   fentaNYL 12 mcg Transdermal Q72H Kika Boles DO   fentanyl citrate (PF) 25 mcg Intravenous Q2H PRN Masha Shine PA-C   gabapentin 100 mg Oral HS Masha Shine PA-C   heparin (porcine) 5,000 Units Subcutaneous [facility] Masha Shine PA-C   insulin lispro 1-5 Units Subcutaneous 4x Daily (AC & HS) Masha Shine PA-C   ivabradine HCl 5 mg Oral BID With Meals Masha Shine PA-C   levothyroxine 88 mcg Oral Early Morning Masha Shine PA-C   lidocaine 1 patch Topical Daily Masha Shine PA-C   lidocaine 1 patch Topical Daily Roni Larry PA-C   mexiletine 150 mg Oral TID Masha Shine PA-C   midodrine 10 mg Oral TID AC Nahomi De La Torre PA-C   montelukast 10 mg Oral HS Masha Shine PA-C   ondansetron 4 mg Intravenous Q4H PRN Masha Shine PA-C   oxyCODONE 5 mg Oral Q4H PRN Masha Shine PA-C   pantoprazole 40 mg Oral Early Morning Masha Shine PA-C   polyethylene glycol 17 g Oral Daily Masha Shine PA-C   warfarin 2 5 mg Oral Daily (warfarin) Adam Gray MD        Today, Patient Was Seen By: Danielle Barth MD    ** Please Note: Dictation voice to text software may have been used in the creation of this document   **

## 2020-02-05 NOTE — ASSESSMENT & PLAN NOTE
No results found for: HGBA1C    Recent Labs     02/04/20 2029 02/05/20  0616 02/05/20  1107 02/05/20  1600   POCGLU 153* 124 175* 136       Blood Sugar Average: Last 72 hrs:  (P) 168 [id number]  SSI, accuchecks

## 2020-02-05 NOTE — ASSESSMENT & PLAN NOTE
· Patient states that prior shortness of breath has been associated with V-tach in the past, as per EMS she had 5 beat run of V-tach  · Chest x-ray shows no acute cardiopulmonary pathology  · Cardiac echo with EF 36%  · Evaluated by Cardiology, no arrhythmia on ICD interrogation, no sign of CHF  · Resolved

## 2020-02-05 NOTE — ASSESSMENT & PLAN NOTE
Wt Readings from Last 3 Encounters:   02/05/20 73 kg (160 lb 15 oz)     EF 36%  Stable at this time without shortness of breath  Monitor daily weights  Monitor volume status

## 2020-02-05 NOTE — SOCIAL WORK
SNF referrals have been made to multiple facilities in the [place], [place] area at patient and family request  Of the eight referrals there are 2 facilities reviewing the referral  Spoke to Demetrio Murray at [employer], [phone] and she requires another [place] screen form to be completed with the patient and faxed to her  Spoke to Karin Crockett at [place], [phone], and she did not receive the [facility] referral  IRENE, H&P, med list and facesheet  were faxed to her at fax #[phone]

## 2020-02-05 NOTE — PROGRESS NOTES
02/05/20 1400   Clinical Encounter Type   Visited With Patient   Scientologist Encounters   Scientologist Needs Prayer   Sacramental Encounters   Sacrament of Sick-Anointing Anointed

## 2020-02-05 NOTE — PROGRESS NOTES
NEPHROLOGY PROGRESS NOTE   Ada Cordero 49 y o  female MRN: [id number]  Unit/Bed#: [facility] 516-01 Encounter: [id number]  Reason for Consult: SACHI    ASSESSMENT AND PLAN:  SACHI suspected to be multifactorial, likely prerenal in the setting of hypotension, blood loss anemia  -with inotropic support, blood transfusion, creatinine now significantly improving from peak level 2 4 to 0 7 today in stable  -urine output good  -avoid nephrotoxins or NSAIDs  -avoid hypotension  -urinalysis bland without hematuria or proteinuria on 1/31/20  -urine sodium 55  -CT scan without contrast shows no hydronephrosis  -status post IV albumin  -okay from renal standpoint to discontinue Ferrera catheter (?  For comfort, patient refusing to discontinue)     Hyponatremia  -suspected to be in the setting of malignancy/SIADH  -status post tolvaptan one dose with improvement in serum sodium, sodium level  seems to be improved and stable 135 today  continue to closely monitor  Mild hypomagnesemia, serum magnesium 1 5   -will give IV magnesium sulfate 2 g once today      Metastatic breast cancer bone involvement  -as per palliative care note, patient is considering hospice care once she moves to [place]      Hypophosphatemia, improved with replacement, serum phosphorus 3 6 today      Hypotension, status post as a pressors now remains off  Blood pressure has overall improved  -status post IV albumin, now remains on midodrine   -blood culture negative so far  -echo shows EF 81%, grade 1 diastolic dysfunction, no pericardial effusion      Discussed above plan in detail with ICU team   Patient will be hospice in [place], currently remains full code  SUBJECTIVE:  Patient seen and examined at bedside   No chest pain, shortness of breath, nausea, vomiting, abdominal pain    OBJECTIVE:  Current Weight: Weight - Scale: 73 kg (160 lb 15 oz)  Vitals:    02/05/20 1135   BP: (!) 109/49   Pulse: 74   Resp: 15   Temp:    SpO2: 97%       Intake/Output Summary (Last 24 hours) at 2/5/2020 1352  Last data filed at 2/5/2020 1130  Gross per 24 hour   Intake 440 ml   Output 2425 ml   Net -1985 ml     Wt Readings from Last 3 Encounters:   02/05/20 73 kg (160 lb 15 oz)     Temp Readings from Last 3 Encounters:   02/05/20 98 7 °F (37 1 °C)     BP Readings from Last 3 Encounters:   02/05/20 (!) 109/49     Pulse Readings from Last 3 Encounters:   02/05/20 74        Physical Examination:  General:  Lying in bed, no acute distress   Eyes:  Mild conjunctival pallor present  ENT:  External examination of ears and nose unremarkable  Neck:  No obvious lymphadenopathy appreciated  Respiratory:  Bilateral air entry present  CVS:  S1, S2 present  GI:  Soft, nontender, nondistended  CNS:  Active alert oriented x3  Extremities:  No significant edema in legs  Skin:  No new rash in legs    Medications:    Current Facility-Administered Medications:     aspirin (ECOTRIN LOW STRENGTH) EC tablet 81 mg, 81 mg, Oral, Daily, Olgawhitney Simmons, PA-C, 81 mg at 02/05/20 0908    fentaNYL (DURAGESIC) 12 mcg/hr TD 72 hr patch 12 mcg, 12 mcg, Transdermal, Q72H, Kika ISAC Boles, DO, 12 mcg at 02/04/20 1242    fentanyl citrate (PF) 100 MCG/2ML 25 mcg, 25 mcg, Intravenous, Q2H PRN, Olga Iris, PA-C, 25 mcg at 02/03/20 1715    gabapentin (NEURONTIN) capsule 100 mg, 100 mg, Oral, HS, Olga Iris, PA-C, 100 mg at 02/04/20 2152    heparin (porcine) subcutaneous injection 5,000 Units, 5,000 Units, Subcutaneous, Q8H [address], Olga Borders, PA-C, 5,000 Units at 02/05/20 1316    insulin lispro (HumaLOG) 100 units/mL subcutaneous injection 1-5 Units, 1-5 Units, Subcutaneous, 4x Daily (AC & HS), 1 Units at 02/05/20 1137 **AND** Fingerstick Glucose (POCT), , , 4x Daily AC and at bedtime, Olga Borders, PA-C    ivabradine HCl (CORLANOR) tablet 5 mg, 5 mg, Oral, BID With Meals, Olga Borders, PA-C, 5 mg at 02/05/20 0908    levothyroxine tablet 88 mcg, 88 mcg, Oral, Early Morning, Kira Jenna Garibay, TEODORA, 88 mcg at 02/05/20 0605    lidocaine (LIDODERM) 5 % patch 1 patch, 1 patch, Topical, Daily, Massie Ganser, PA-C, 1 patch at 02/05/20 0910    lidocaine (LIDODERM) 5 % patch 1 patch, 1 patch, Topical, Daily, Bridger Montanez PA-C, 1 patch at 02/05/20 0910    mexiletine (MEXITIL) capsule 150 mg, 150 mg, Oral, TID, Massie Ganser, PA-C, 150 mg at 02/05/20 0908    midodrine (PROAMATINE) tablet 10 mg, 10 mg, Oral, TID AC, Nahomi De La Torre PA-C, 10 mg at 02/05/20 1135    montelukast (SINGULAIR) tablet 10 mg, 10 mg, Oral, HS, Massie Ganser, PA-C, 10 mg at 02/04/20 2153    ondansetron (ZOFRAN) injection 4 mg, 4 mg, Intravenous, Q4H PRN, Massie Ganser, PA-C, 4 mg at 02/03/20 1930    oxyCODONE (ROXICODONE) IR tablet 5 mg, 5 mg, Oral, Q4H PRN, Massie Ganser, PA-C, 5 mg at 02/04/20 1242    pantoprazole (PROTONIX) EC tablet 40 mg, 40 mg, Oral, Early Morning, Massie Ganser, PA-C, 40 mg at 02/05/20 0605    polyethylene glycol (MIRALAX) packet 17 g, 17 g, Oral, Daily, Massie Ganser, PA-C, 17 g at 02/05/20 3581    Laboratory Results:  Results from last 7 days   Lab Units 02/05/20  0612 02/04/20  0543 02/03/20  0538 02/02/20  1122 02/02/20  0453 02/01/20  0545 01/31/20  0524 01/30/20  0444   WBC Thousand/uL 8 93 11 05* 15 72*  --  18 50* 16 13* 12 50*  --    HEMOGLOBIN g/dL 7 3* 7 4* 7 8* 7 9* 6 8* 7 9* 8 0*  --    HEMATOCRIT % 23 1* 22 4* 23 6*  --  20 3* 24 7* 25 2*  --    PLATELETS Thousands/uL 165 181 205  --  212 241 273  --    SODIUM mmol/L 135* 133* 134*  --  130* 125* 126* 128*   POTASSIUM mmol/L 4 1 3 7 3 9  --  4 7 5 2 4 7 4 3   CHLORIDE mmol/L 102 101 103  --  100 93* 93* 95*   CO2 mmol/L 28 27 23  --  22 23 25 26   BUN mg/dL 13 13 23  --  47* 61* 44* 26*   CREATININE mg/dL 0 70 0 78 1 04  --  1 71* 2 43* 1 84* 0 89   CALCIUM mg/dL 9 9 9 9 9 6  --  9 1 8 9 8 9 9 3   MAGNESIUM mg/dL 1 5* 1 6 1 6  --   --   --  1 9  --    PHOSPHORUS mg/dL 3 6  --  2 2*  --   --   --   --   -- XR chest portable   Final Result by Bruna Washington MD (02/02 1220)      No acute cardiopulmonary disease  Right IJ central line has been placed with tip at the cavoatrial junction  No pneumothorax  Workstation performed: [id number]         CT abdomen pelvis wo contrast   Final Result by Angel Luis Rawls MD (01/31 1014)      Redemonstration of diffuse metastatic hepatic disease  Diffuse osseous metastatic disease  No pathologic fractures are identified  Cholelithiasis without CT evidence for acute cholecystitis  Workstation performed: [id number]         US right upper quadrant   Final Result by Bruna Washington MD (01/30 1549)      Moderate hepatomegaly, with diffusely heterogeneous nodular liver parenchyma in keeping with history of widespread metastatic disease  There is no evidence of biliary ductal dilatation  There is sludge and small gallstone within the gallbladder, without evidence of acute cholecystitis  Workstation performed: [id number]         XR chest 1 view portable   ED Interpretation by Panda Rangel DO (01/29 1316)   No acute cardiopulmonary disease      Final Result by Sherryle Kos, MD (01/29 1353)   Typical left-sided ICD      No acute cardiopulmonary disease  Findings are consistent with emergency provider's preliminary reading                     Workstation performed: [id number]             Portions of the record may have been created with voice recognition software  Occasional wrong word or "sound a like" substitutions may have occurred due to the inherent limitations of voice recognition software  Read the chart carefully and recognize, using context, where substitutions have occurred

## 2020-02-06 PROBLEM — D64.9 ANEMIA: Status: ACTIVE | Noted: 2020-02-06

## 2020-02-06 LAB
ALBUMIN SERPL BCP-MCNC: 2 G/DL (ref 3.5–5)
ALP SERPL-CCNC: 901 U/L (ref 46–116)
ALT SERPL W P-5'-P-CCNC: 85 U/L (ref 12–78)
ANION GAP SERPL CALCULATED.3IONS-SCNC: 3 MMOL/L (ref 4–13)
AST SERPL W P-5'-P-CCNC: 137 U/L (ref 5–45)
BACTERIA BLD CULT: NORMAL
BACTERIA BLD CULT: NORMAL
BILIRUB DIRECT SERPL-MCNC: 4.44 MG/DL (ref 0–0.2)
BILIRUB SERPL-MCNC: 5.3 MG/DL (ref 0.2–1)
BUN SERPL-MCNC: 11 MG/DL (ref 5–25)
CALCIUM SERPL-MCNC: 9.6 MG/DL (ref 8.3–10.1)
CHLORIDE SERPL-SCNC: 101 MMOL/L (ref 100–108)
CHLORIDE UR-SCNC: 62 MMOL/L
CO2 SERPL-SCNC: 29 MMOL/L (ref 21–32)
CREAT SERPL-MCNC: 0.64 MG/DL (ref 0.6–1.3)
ERYTHROCYTE [DISTWIDTH] IN BLOOD BY AUTOMATED COUNT: 18.6 % (ref 11.6–15.1)
GFR SERPL CREATININE-BSD FRML MDRD: 102 ML/MIN/1.73SQ M
GLUCOSE SERPL-MCNC: 112 MG/DL (ref 65–140)
GLUCOSE SERPL-MCNC: 121 MG/DL (ref 65–140)
GLUCOSE SERPL-MCNC: 123 MG/DL (ref 65–140)
GLUCOSE SERPL-MCNC: 132 MG/DL (ref 65–140)
GLUCOSE SERPL-MCNC: 136 MG/DL (ref 65–140)
HCT VFR BLD AUTO: 22.6 % (ref 34.8–46.1)
HGB BLD-MCNC: 7.1 G/DL (ref 11.5–15.4)
INR PPP: 1.29 (ref 0.84–1.19)
MCH RBC QN AUTO: 31.6 PG (ref 26.8–34.3)
MCHC RBC AUTO-ENTMCNC: 31.4 G/DL (ref 31.4–37.4)
MCV RBC AUTO: 100 FL (ref 82–98)
OSMOLALITY UR: 367 MMOL/KG
PLATELET # BLD AUTO: 153 THOUSANDS/UL (ref 149–390)
PMV BLD AUTO: 10.8 FL (ref 8.9–12.7)
POTASSIUM SERPL-SCNC: 3.8 MMOL/L (ref 3.5–5.3)
PROT SERPL-MCNC: 5.8 G/DL (ref 6.4–8.2)
PROTHROMBIN TIME: 15.7 SECONDS (ref 11.6–14.5)
RBC # BLD AUTO: 2.25 MILLION/UL (ref 3.81–5.12)
SODIUM 24H UR-SCNC: 81 MOL/L
SODIUM SERPL-SCNC: 133 MMOL/L (ref 136–145)
WBC # BLD AUTO: 7.48 THOUSAND/UL (ref 4.31–10.16)

## 2020-02-06 PROCEDURE — 80048 BASIC METABOLIC PNL TOTAL CA: CPT | Performed by: INTERNAL MEDICINE

## 2020-02-06 PROCEDURE — 83935 ASSAY OF URINE OSMOLALITY: CPT | Performed by: INTERNAL MEDICINE

## 2020-02-06 PROCEDURE — 99232 SBSQ HOSP IP/OBS MODERATE 35: CPT | Performed by: INTERNAL MEDICINE

## 2020-02-06 PROCEDURE — 82948 REAGENT STRIP/BLOOD GLUCOSE: CPT

## 2020-02-06 PROCEDURE — 82436 ASSAY OF URINE CHLORIDE: CPT | Performed by: INTERNAL MEDICINE

## 2020-02-06 PROCEDURE — 85610 PROTHROMBIN TIME: CPT | Performed by: INTERNAL MEDICINE

## 2020-02-06 PROCEDURE — 84300 ASSAY OF URINE SODIUM: CPT | Performed by: INTERNAL MEDICINE

## 2020-02-06 PROCEDURE — 85027 COMPLETE CBC AUTOMATED: CPT | Performed by: INTERNAL MEDICINE

## 2020-02-06 PROCEDURE — 80076 HEPATIC FUNCTION PANEL: CPT | Performed by: INTERNAL MEDICINE

## 2020-02-06 RX ORDER — SODIUM CHLORIDE 1000 MG
1 TABLET, SOLUBLE MISCELLANEOUS 2 TIMES DAILY WITH MEALS
Status: DISCONTINUED | OUTPATIENT
Start: 2020-02-06 | End: 2020-02-07

## 2020-02-06 RX ADMIN — OXYCODONE HYDROCHLORIDE 5 MG: 5 TABLET ORAL at 21:41

## 2020-02-06 RX ADMIN — LIDOCAINE 1 PATCH: 50 PATCH TOPICAL at 21:41

## 2020-02-06 RX ADMIN — HEPARIN SODIUM 5000 UNITS: 5000 INJECTION INTRAVENOUS; SUBCUTANEOUS at 06:03

## 2020-02-06 RX ADMIN — GABAPENTIN 100 MG: 100 CAPSULE ORAL at 21:40

## 2020-02-06 RX ADMIN — PANTOPRAZOLE SODIUM 40 MG: 40 TABLET, DELAYED RELEASE ORAL at 06:03

## 2020-02-06 RX ADMIN — MEXILETINE HYDROCHLORIDE 150 MG: 150 CAPSULE ORAL at 18:25

## 2020-02-06 RX ADMIN — WARFARIN SODIUM 2.5 MG: 2.5 TABLET ORAL at 18:24

## 2020-02-06 RX ADMIN — LIDOCAINE 1 PATCH: 50 PATCH TOPICAL at 09:10

## 2020-02-06 RX ADMIN — MIDODRINE HYDROCHLORIDE 10 MG: 5 TABLET ORAL at 18:24

## 2020-02-06 RX ADMIN — MIDODRINE HYDROCHLORIDE 10 MG: 5 TABLET ORAL at 06:03

## 2020-02-06 RX ADMIN — POLYETHYLENE GLYCOL 3350 17 G: 17 POWDER, FOR SOLUTION ORAL at 09:13

## 2020-02-06 RX ADMIN — MIDODRINE HYDROCHLORIDE 10 MG: 5 TABLET ORAL at 11:45

## 2020-02-06 RX ADMIN — IVABRADINE 5 MG: 5 TABLET, FILM COATED ORAL at 09:13

## 2020-02-06 RX ADMIN — SODIUM CHLORIDE TAB 1 GM 1 G: 1 TAB at 18:24

## 2020-02-06 RX ADMIN — MEXILETINE HYDROCHLORIDE 150 MG: 150 CAPSULE ORAL at 21:41

## 2020-02-06 RX ADMIN — MEXILETINE HYDROCHLORIDE 150 MG: 150 CAPSULE ORAL at 09:13

## 2020-02-06 RX ADMIN — IVABRADINE 5 MG: 5 TABLET, FILM COATED ORAL at 18:25

## 2020-02-06 RX ADMIN — ASPIRIN 81 MG: 81 TABLET ORAL at 09:13

## 2020-02-06 RX ADMIN — ONDANSETRON 4 MG: 2 INJECTION INTRAMUSCULAR; INTRAVENOUS at 18:40

## 2020-02-06 RX ADMIN — MONTELUKAST SODIUM 10 MG: 10 TABLET, FILM COATED ORAL at 21:40

## 2020-02-06 RX ADMIN — LEVOTHYROXINE SODIUM 88 MCG: 88 TABLET ORAL at 06:03

## 2020-02-06 RX ADMIN — HEPARIN SODIUM 5000 UNITS: 5000 INJECTION INTRAVENOUS; SUBCUTANEOUS at 21:44

## 2020-02-06 RX ADMIN — HEPARIN SODIUM 5000 UNITS: 5000 INJECTION INTRAVENOUS; SUBCUTANEOUS at 13:08

## 2020-02-06 RX ADMIN — FENTANYL CITRATE 25 MCG: 50 INJECTION, SOLUTION INTRAMUSCULAR; INTRAVENOUS at 13:38

## 2020-02-06 NOTE — SOCIAL WORK
Call placed to Megha Hendricks, admissions at [facility], [phone]  Left message requesting call back to confirm that she received the [facility] and SCREEN form to review

## 2020-02-06 NOTE — SOCIAL WORK
Call received from Anuja Dias, admissions with [facility]  She received the [facility] and SCREEN forms  Her administration is now needing to review for financial information and coverage  Concern is that patient may not be covered for long period of time by her insurance and they need to be sure there are resources to pay privately  Patient does not have residency in [place] so does not qualify at this time for MA  Met with patient and called brother, Jossy Gill, cell # [phone], to discuss above and to complete snf applciation with financial information  Brother provided information on life insurance policy which has clause that $ can be withdrawn to use for patient care  Faxed paperwork to Anuja Dias at [employer], fax # [phone]  Call received from Karin Crockett, admissions with [facility]  They are still reviewing and also concerned about the cost of care which is $12,000-$15,000/month

## 2020-02-06 NOTE — ASSESSMENT & PLAN NOTE
With metastasis to bone and liver, ongoing low back pain  Evaluated by Oncology, recommendation for follow-up with her oncologist  Poor prognosis  Palliative Care is following  Patient currently like to continue with full code and with above treatment

## 2020-02-06 NOTE — PROGRESS NOTES
Progress Note - Brenda Vo 1965, 49 y o  female MRN: [id number]    Unit/Bed#: [facility] 237-46 Encounter: [id number]    Primary Care Provider: Akira Soriano MD   Date and time admitted to hospital: 1/29/2020 11:33 AM        Anemia  Assessment & Plan  Likely multifactorial including chronic disease/malignancy  No signs of acute bleeding at this time    Repeat CBC in a m , if worse will need blood transfusion    Type 2 diabetes mellitus without complication, without long-term current use of insulin [facility])  Assessment & Plan  No results found for: HGBA1C    Recent Labs     02/05/20  1600 02/05/20  2116 02/06/20  0616 02/06/20  1112   POCGLU 136 158* 121 136       Blood Sugar Average: Last 72 hrs:  (P) 771 [id number]  SSI, accuchecks    Cardiomyopathy secondary to drug [facility])  Assessment & Plan  Nonischemic cardiomyopathy, previous EF 25%,  now 36%  Secondary to chemotherapy       Chronic systolic heart failure (HCC)  Assessment & Plan  Wt Readings from Last 3 Encounters:   02/06/20 75 kg (165 lb 5 5 oz)     EF 36%  Stable at this time without shortness of breath  Monitor daily weights  Monitor volume status    Elevated LFTs  Assessment & Plan  Likely related to worsening metastasis liver disease  Jaundice  Liver ultrasound with widespread metastatic disease  Abdominal CT scan with pneumobilia  Evaluated by GI, no biliary obstruction  Continue supportive care        Metastatic breast cancer ([facility] [address] )  Assessment & Plan  With metastasis to bone and liver, ongoing low back pain  Evaluated by Oncology, recommendation for follow-up with her oncologist  Poor prognosis  Palliative Care is following  Patient currently like to continue with full code and with above treatment    Chronic atrial fibrillation  Assessment & Plan  Rate controlled  Continue corlanor  Digoxin held  Continue warfarin at 2 5 mg  Monitor INR    Ventricular tachycardia ([address] )  Assessment & Plan  No arrhythmia on ICD interrogation  Stable on corelanor      * SOB (shortness of breath)  Assessment & Plan  · Patient states that prior shortness of breath has been associated with V-tach in the past, as per EMS she had 5 beat run of V-tach  · Chest x-ray shows no acute cardiopulmonary pathology  · Cardiac echo with EF 36%  · Evaluated by Cardiology, no arrhythmia on ICD interrogation, no sign of CHF  · Resolved          VTE Pharmacologic Prophylaxis:   Pharmacologic: Warfarin (Coumadin)  Mechanical VTE Prophylaxis in Place: Yes    Patient Centered Rounds: I have performed bedside rounds with nursing staff today  Education and Discussions with Family / Patient:  Patient, plan of care    Time Spent for Care: 20 minutes  More than 50% of total time spent on counseling and coordination of care as described above  Current Length of Stay: 8 day(s)    Current Patient Status: Inpatient   Certification Statement: The patient will continue to require additional inpatient hospital stay due to Discharge Plan    Discharge Plan:  SNF in [place] state    Code Status: Level 1 - Full Code      Subjective:   Denies any acute changes including dyspnea, chest pain, shortness of breath, abdominal pain  Objective:     Vitals:   Temp (24hrs), Av 7 °F (37 1 °C), Min:98 1 °F (36 7 °C), Max:99 2 °F (37 3 °C)    Temp:  [98 1 °F (36 7 °C)-99 2 °F (37 3 °C)] 99 2 °F (37 3 °C)  HR:  [74-80] 74  Resp:  [14-39] 29  BP: ()/(46-59) 97/56  SpO2:  [85 %-96 %] 96 %  Body mass index is 25 14 kg/m²  Input and Output Summary (last 24 hours): Intake/Output Summary (Last 24 hours) at 2020 1612  Last data filed at 2020 1301  Gross per 24 hour   Intake 1130 ml   Output 1676 ml   Net -546 ml       Physical Exam:     Physical Exam   Constitutional: She is oriented to person, place, and time  Thin middle-aged female, lying in bed, appears comfortable   HENT:   Head: Normocephalic and atraumatic  Eyes: Pupils are equal, round, and reactive to light     Neck: Neck supple  Pulmonary/Chest: Effort normal    Abdominal: She exhibits no distension  Neurological: She is alert and oriented to person, place, and time  No cranial nerve deficit  Additional Data:     Labs:    Results from last 7 days   Lab Units 02/06/20  0605  02/03/20  0538  02/02/20  0453   WBC Thousand/uL 7 48   < > 15 72*  --  18 50*   HEMOGLOBIN g/dL 7 1*   < > 7 8*   < > 6 8*   HEMATOCRIT % 22 6*   < > 23 6*  --  20 3*   PLATELETS Thousands/uL 153   < > 205  --  212   BANDS PCT %  --   --   --   --  1   NEUTROS PCT %  --   --  81*  --   --    LYMPHS PCT %  --   --  7*  --   --    LYMPHO PCT %  --   --   --   --  5*   MONOS PCT %  --   --  10  --   --    MONO PCT %  --   --   --   --  4   EOS PCT %  --   --  0  --  2    < > = values in this interval not displayed  Results from last 7 days   Lab Units 02/06/20  0605   SODIUM mmol/L 133*   POTASSIUM mmol/L 3 8   CHLORIDE mmol/L 101   CO2 mmol/L 29   BUN mg/dL 11   CREATININE mg/dL 0 64   ANION GAP mmol/L 3*   CALCIUM mg/dL 9 6   ALBUMIN g/dL 2 0*   TOTAL BILIRUBIN mg/dL 5 30*   ALK PHOS U/L 901*   ALT U/L 85*   AST U/L 137*   GLUCOSE RANDOM mg/dL 112     Results from last 7 days   Lab Units 02/06/20  0606   INR  1 29*     Results from last 7 days   Lab Units 02/06/20  1112 02/06/20  0616 02/05/20  2116 02/05/20  1600 02/05/20  1107 02/05/20  0616 02/04/20  2029 02/04/20  1629 02/04/20  1103 02/04/20  0828 02/03/20  2041 02/03/20  1126   POC GLUCOSE mg/dl 136 121 158* 136 175* 124 153* 146* 209* 109 225* 146*                   * I Have Reviewed All Lab Data Listed Above  * Additional Pertinent Lab Tests Reviewed: All Labs Within Last 24 Hours Reviewed      Recent Cultures (last 7 days):     Results from last 7 days   Lab Units 02/01/20  0546   BLOOD CULTURE  No Growth After 5 Days  No Growth After 5 Days         Last 24 Hours Medication List:     Current Facility-Administered Medications:  aspirin 81 mg Oral Daily Shira Lala PA-C   fentaNYL 12 mcg Transdermal Q72H Shira R BUCKY Lala-C   fentanyl citrate (PF) 25 mcg Intravenous Q2H PRN BUCKY Bradshaw-C   gabapentin 100 mg Oral HS Shira R BUCKY Lala-MYLES   heparin (porcine) 5,000 Units Subcutaneous Q8H [facility] Shira R BUCKY Lala-C   insulin lispro 1-5 Units Subcutaneous 4x Daily (AC & HS) BUCKY Bradshaw-C   ivabradine HCl 5 mg Oral BID With Meals Dal Killings, PA-C   levothyroxine 88 mcg Oral Early Morning Shahbaz Lala, PA-C   lidocaine 1 patch Topical Daily [place] Lingo Dai, PA-C   lidocaine 1 patch Topical Daily Jatin Killings, PA-C   mexiletine 150 mg Oral TID Dal Killings, PA-C   midodrine 10 mg Oral TID AC Shira R Dai, PA-C   montelukast 10 mg Oral HS Shira R BUCKY Lala-C   ondansetron 4 mg Intravenous Q4H PRN Jatin Killings, PA-C   oxyCODONE 5 mg Oral Q4H PRN Shahbaz Lala, PA-C   pantoprazole 40 mg Oral Early Morning Shira R Dai, PA-C   polyethylene glycol 17 g Oral Daily Shira R Dai, PA-C   sodium chloride 1 g Oral BID With Meals Peggy Queen MD   warfarin 2 5 mg Oral Daily (warfarin) Peggy Queen MD        Today, Patient Was Seen By: Parviz Gong MD    ** Please Note: Dictation voice to text software may have been used in the creation of this document   **

## 2020-02-06 NOTE — ASSESSMENT & PLAN NOTE
Likely multifactorial including chronic disease/malignancy  No signs of acute bleeding at this time    Repeat CBC in a m , if worse will need blood transfusion

## 2020-02-06 NOTE — PROGRESS NOTES
NEPHROLOGY PROGRESS NOTE   Young Chimera 49 y o  female MRN: [id number]  Unit/Bed#: [facility] 516-01 Encounter: [id number]  Reason for Consult: Hyponatremia, SACHI    ASSESSMENT AND PLAN:  SACHI suspected to be multifactorial, likely prerenal in the setting of hypotension, blood loss anemia  -with inotropic support, blood transfusion, creatinine now significantly improving from peak level 2 4 to 0 7 today in stable  -urine output good  -avoid nephrotoxins or NSAIDs  -avoid hypotension  -urinalysis bland without hematuria or proteinuria on 1/31/20  -urine sodium 55  -CT scan without contrast shows no hydronephrosis  -status post IV albumin  -okay from renal standpoint to discontinue Ferrera catheter      Hyponatremia  -suspected to be in the setting of malignancy/SIADH  -status post tolvaptan one dose with improvement in serum sodium, sodium level overall fluctuating but somewhat stable at 133 today    -check urine osmolality, urine sodium, serum osmolality    -fluid restriction 1 5 L per day  -will start salt tablet 1 g p o  B i d      Mild hypomagnesemia, serum magnesium 1 5 , status post replacement  Repeat serum magnesium in a m      Metastatic breast cancer bone involvement  -as per palliative care note, patient is considering hospice care once she moves to [place]      Hypophosphatemia, improved with replacement, serum phosphorus 3 6 today      Hypotension, status post pressors now remains off   Blood pressure has overall improved  -status post IV albumin, now remains on midodrine   -blood culture negative so far  -echo shows EF 75%, grade 1 diastolic dysfunction, no pericardial effusion      Discussed above plan in detail with primary team   Patient will be hospice in [place], currently remains full code  SUBJECTIVE:  Patient seen and examined at bedside   No chest pain, shortness of breath, nausea, vomiting, abdominal pain    OBJECTIVE:  Current Weight: Weight - Scale: 75 kg (165 lb 5 5 oz)  Vitals: 02/06/20 1150   BP: 97/56   Pulse: 74   Resp: (!) 29   Temp:    SpO2:        Intake/Output Summary (Last 24 hours) at 2/6/2020 1202  Last data filed at 2/6/2020 1145  Gross per 24 hour   Intake 1010 ml   Output 1676 ml   Net -666 ml     Wt Readings from Last 3 Encounters:   02/06/20 75 kg (165 lb 5 5 oz)     Temp Readings from Last 3 Encounters:   02/06/20 99 2 °F (37 3 °C) (Oral)     BP Readings from Last 3 Encounters:   02/06/20 97/56     Pulse Readings from Last 3 Encounters:   02/06/20 74        Physical Examination:  General:  Sitting in chair, no acute distress   Eyes:  Mild conjunctival pallor present  ENT:  External examination of ears and nose unremarkable  Neck:  No obvious lymphadenopathy appreciated  Respiratory:  Bilateral air entry present  CVS:  S1, S2 present  GI:  Soft, nontender, nondistended  CNS:  Active alert oriented x3  Extremities:  No significant edema in legs  Skin:  No new rash in legs    Medications:    Current Facility-Administered Medications:     aspirin (ECOTRIN LOW STRENGTH) EC tablet 81 mg, 81 mg, Oral, Daily, Willy Martin PA-C, 81 mg at 02/06/20 0913    fentaNYL (DURAGESIC) 12 mcg/hr TD 72 hr patch 12 mcg, 12 mcg, Transdermal, Q72H, Kika Boles DO, 12 mcg at 02/04/20 1242    fentanyl citrate (PF) 100 MCG/2ML 25 mcg, 25 mcg, Intravenous, Q2H PRN, Willy Martin PA-C, 25 mcg at 02/03/20 1715    gabapentin (NEURONTIN) capsule 100 mg, 100 mg, Oral, HS, Willy Martin PA-C, 100 mg at 02/05/20 2246    heparin (porcine) subcutaneous injection 5,000 Units, 5,000 Units, Subcutaneous, Q8H [address], Willy Martin PA-C, 5,000 Units at 02/06/20 0603    insulin lispro (HumaLOG) 100 units/mL subcutaneous injection 1-5 Units, 1-5 Units, Subcutaneous, 4x Daily (AC & HS), 1 Units at 02/05/20 2129 **AND** Fingerstick Glucose (POCT), , , 4x Daily AC and at bedtime, Willy Martin PA-C    ivabradine HCl (CORLANOR) tablet 5 mg, 5 mg, Oral, BID With Meals, Willy Martin PA-C, 5 mg at 02/06/20 0913    levothyroxine tablet 88 mcg, 88 mcg, Oral, Early Morning, Ariane Escalante PA-C, 88 mcg at 02/06/20 0603    lidocaine (LIDODERM) 5 % patch 1 patch, 1 patch, Topical, Daily, Ariane Escalante PA-C, 1 patch at 02/05/20 0910    lidocaine (LIDODERM) 5 % patch 1 patch, 1 patch, Topical, Daily, Laureen Andrade PA-C, 1 patch at 02/06/20 0910    mexiletine (MEXITIL) capsule 150 mg, 150 mg, Oral, TID, Ariane Escalante PA-C, 150 mg at 02/06/20 0913    midodrine (PROAMATINE) tablet 10 mg, 10 mg, Oral, TID AC, Nahomi De La Torre PA-C, 10 mg at 02/06/20 1145    montelukast (SINGULAIR) tablet 10 mg, 10 mg, Oral, HS, Ariane Escalante PA-C, 10 mg at 02/05/20 2246    ondansetron [facility]) injection 4 mg, 4 mg, Intravenous, Q4H PRN, Ariane Escalante PA-C, 4 mg at 02/05/20 2125    oxyCODONE (ROXICODONE) IR tablet 5 mg, 5 mg, Oral, Q4H PRN, Ariane Escalante PA-C, 5 mg at 02/05/20 2253    pantoprazole (PROTONIX) EC tablet 40 mg, 40 mg, Oral, Early Morning, Ariane Escalante PA-C, 40 mg at 02/06/20 0603    polyethylene glycol (MIRALAX) packet 17 g, 17 g, Oral, Daily, Ariane Escalante PA-C, 17 g at 02/06/20 0913    warfarin (COUMADIN) tablet 2 5 mg, 2 5 mg, Oral, Daily (warfarin), Marlin Mckeon MD, 2 5 mg at 02/05/20 1723    Laboratory Results:  Results from last 7 days   Lab Units 02/06/20  0605 02/05/20  0612 02/04/20  0543 02/03/20  0538 02/02/20  1122 02/02/20  0453 02/01/20  0545 01/31/20  0524   WBC Thousand/uL 7 48 8 93 11 05* 15 72*  --  18 50* 16 13* 12 50*   HEMOGLOBIN g/dL 7 1* 7 3* 7 4* 7 8* 7 9* 6 8* 7 9* 8 0*   HEMATOCRIT % 22 6* 23 1* 22 4* 23 6*  --  20 3* 24 7* 25 2*   PLATELETS Thousands/uL 153 165 181 205  --  212 241 273   SODIUM mmol/L 133* 135* 133* 134*  --  130* 125* 126*   POTASSIUM mmol/L 3 8 4 1 3 7 3 9  --  4 7 5 2 4 7   CHLORIDE mmol/L 101 102 101 103  --  100 93* 93*   CO2 mmol/L 29 28 27 23  --  22 23 25   BUN mg/dL 11 13 13 23  --  47* 61* 44*   CREATININE mg/dL 0  64 0 70 0 78 1 04  --  1 71* 2 43* 1 84*   CALCIUM mg/dL 9 6 9 9 9 9 9 6  --  9 1 8 9 8 9   MAGNESIUM mg/dL  --  1 5* 1 6 1 6  --   --   --  1 9   PHOSPHORUS mg/dL  --  3 6  --  2 2*  --   --   --   --        XR chest portable   Final Result by Yaw Abdi MD (02/02 1220)      No acute cardiopulmonary disease  Right IJ central line has been placed with tip at the cavoatrial junction  No pneumothorax  Workstation performed: [id number]         CT abdomen pelvis wo contrast   Final Result by George Veliz MD (01/31 1014)      Redemonstration of diffuse metastatic hepatic disease  Diffuse osseous metastatic disease  No pathologic fractures are identified  Cholelithiasis without CT evidence for acute cholecystitis  Workstation performed: [id number]         US right upper quadrant   Final Result by Yaw Abdi MD (01/30 2919)      Moderate hepatomegaly, with diffusely heterogeneous nodular liver parenchyma in keeping with history of widespread metastatic disease  There is no evidence of biliary ductal dilatation  There is sludge and small gallstone within the gallbladder, without evidence of acute cholecystitis  Workstation performed: [id number]         XR chest 1 view portable   ED Interpretation by Rizwan Aponte DO (01/29 1316)   No acute cardiopulmonary disease      Final Result by Deng Lara MD (01/29 1353)   Typical left-sided ICD      No acute cardiopulmonary disease  Findings are consistent with emergency provider's preliminary reading                     Workstation performed: [id number]             Portions of the record may have been created with voice recognition software  Occasional wrong word or "sound a like" substitutions may have occurred due to the inherent limitations of voice recognition software  Read the chart carefully and recognize, using context, where substitutions have occurred

## 2020-02-06 NOTE — ASSESSMENT & PLAN NOTE
Wt Readings from Last 3 Encounters:   02/06/20 75 kg (165 lb 5 5 oz)     EF 36%  Stable at this time without shortness of breath  Monitor daily weights  Monitor volume status

## 2020-02-06 NOTE — PROGRESS NOTES
met with patient to assess needs for spiritual care  Patient states that she is doing well and declines services at this time  She was visited yesterday by Faisal York and was anointed         02/06/20 1100   Clinical Encounter Type   Visited With Patient   Routine Visit Introduction   Continue Visiting No   Referral From Nurse   Referral To

## 2020-02-06 NOTE — ASSESSMENT & PLAN NOTE
No results found for: HGBA1C    Recent Labs     02/05/20  1600 02/05/20  2116 02/06/20  0616 02/06/20  1112   POCGLU 136 158* 121 136       Blood Sugar Average: Last 72 hrs:  (P) 307 [id number]  WOLF, accuchecks

## 2020-02-07 LAB
ANION GAP SERPL CALCULATED.3IONS-SCNC: 3 MMOL/L (ref 4–13)
BUN SERPL-MCNC: 13 MG/DL (ref 5–25)
CALCIUM SERPL-MCNC: 9.5 MG/DL (ref 8.3–10.1)
CHLORIDE SERPL-SCNC: 100 MMOL/L (ref 100–108)
CO2 SERPL-SCNC: 29 MMOL/L (ref 21–32)
CREAT SERPL-MCNC: 0.77 MG/DL (ref 0.6–1.3)
ERYTHROCYTE [DISTWIDTH] IN BLOOD BY AUTOMATED COUNT: 19.3 % (ref 11.6–15.1)
GFR SERPL CREATININE-BSD FRML MDRD: 88 ML/MIN/1.73SQ M
GLUCOSE SERPL-MCNC: 111 MG/DL (ref 65–140)
GLUCOSE SERPL-MCNC: 113 MG/DL (ref 65–140)
GLUCOSE SERPL-MCNC: 126 MG/DL (ref 65–140)
GLUCOSE SERPL-MCNC: 157 MG/DL (ref 65–140)
GLUCOSE SERPL-MCNC: 215 MG/DL (ref 65–140)
HCT VFR BLD AUTO: 24.1 % (ref 34.8–46.1)
HGB BLD-MCNC: 7.5 G/DL (ref 11.5–15.4)
INR PPP: 1.24 (ref 0.84–1.19)
MCH RBC QN AUTO: 31.5 PG (ref 26.8–34.3)
MCHC RBC AUTO-ENTMCNC: 31.1 G/DL (ref 31.4–37.4)
MCV RBC AUTO: 101 FL (ref 82–98)
OSMOLALITY UR/SERPL-RTO: 285 MMOL/KG (ref 282–298)
PLATELET # BLD AUTO: 166 THOUSANDS/UL (ref 149–390)
PMV BLD AUTO: 10.8 FL (ref 8.9–12.7)
POTASSIUM SERPL-SCNC: 4.1 MMOL/L (ref 3.5–5.3)
PROTHROMBIN TIME: 15.2 SECONDS (ref 11.6–14.5)
RBC # BLD AUTO: 2.38 MILLION/UL (ref 3.81–5.12)
SODIUM SERPL-SCNC: 132 MMOL/L (ref 136–145)
URATE SERPL-MCNC: 2.6 MG/DL (ref 2–6.8)
WBC # BLD AUTO: 8.03 THOUSAND/UL (ref 4.31–10.16)

## 2020-02-07 PROCEDURE — 99232 SBSQ HOSP IP/OBS MODERATE 35: CPT | Performed by: INTERNAL MEDICINE

## 2020-02-07 PROCEDURE — 84550 ASSAY OF BLOOD/URIC ACID: CPT | Performed by: INTERNAL MEDICINE

## 2020-02-07 PROCEDURE — 82948 REAGENT STRIP/BLOOD GLUCOSE: CPT

## 2020-02-07 PROCEDURE — 83930 ASSAY OF BLOOD OSMOLALITY: CPT | Performed by: INTERNAL MEDICINE

## 2020-02-07 PROCEDURE — 85610 PROTHROMBIN TIME: CPT | Performed by: INTERNAL MEDICINE

## 2020-02-07 PROCEDURE — 85027 COMPLETE CBC AUTOMATED: CPT | Performed by: INTERNAL MEDICINE

## 2020-02-07 PROCEDURE — 99233 SBSQ HOSP IP/OBS HIGH 50: CPT | Performed by: INTERNAL MEDICINE

## 2020-02-07 PROCEDURE — 80048 BASIC METABOLIC PNL TOTAL CA: CPT | Performed by: INTERNAL MEDICINE

## 2020-02-07 RX ORDER — FUROSEMIDE 20 MG/1
10 TABLET ORAL DAILY
Status: DISCONTINUED | OUTPATIENT
Start: 2020-02-07 | End: 2020-02-11 | Stop reason: HOSPADM

## 2020-02-07 RX ORDER — SODIUM CHLORIDE 1000 MG
2 TABLET, SOLUBLE MISCELLANEOUS 2 TIMES DAILY WITH MEALS
Status: DISCONTINUED | OUTPATIENT
Start: 2020-02-07 | End: 2020-02-11 | Stop reason: HOSPADM

## 2020-02-07 RX ADMIN — LIDOCAINE 1 PATCH: 50 PATCH TOPICAL at 12:11

## 2020-02-07 RX ADMIN — PANTOPRAZOLE SODIUM 40 MG: 40 TABLET, DELAYED RELEASE ORAL at 05:28

## 2020-02-07 RX ADMIN — ASPIRIN 81 MG: 81 TABLET ORAL at 08:01

## 2020-02-07 RX ADMIN — MEXILETINE HYDROCHLORIDE 150 MG: 150 CAPSULE ORAL at 21:35

## 2020-02-07 RX ADMIN — IVABRADINE 5 MG: 5 TABLET, FILM COATED ORAL at 17:09

## 2020-02-07 RX ADMIN — MIDODRINE HYDROCHLORIDE 10 MG: 5 TABLET ORAL at 07:53

## 2020-02-07 RX ADMIN — FENTANYL 12 MCG: 12 PATCH, EXTENDED RELEASE TRANSDERMAL at 12:20

## 2020-02-07 RX ADMIN — HEPARIN SODIUM 5000 UNITS: 5000 INJECTION INTRAVENOUS; SUBCUTANEOUS at 21:35

## 2020-02-07 RX ADMIN — INSULIN LISPRO 2 UNITS: 100 INJECTION, SOLUTION INTRAVENOUS; SUBCUTANEOUS at 21:39

## 2020-02-07 RX ADMIN — GABAPENTIN 100 MG: 100 CAPSULE ORAL at 21:35

## 2020-02-07 RX ADMIN — Medication 2 G: at 17:08

## 2020-02-07 RX ADMIN — OXYCODONE HYDROCHLORIDE 5 MG: 5 TABLET ORAL at 17:15

## 2020-02-07 RX ADMIN — WARFARIN SODIUM 2.5 MG: 2.5 TABLET ORAL at 17:12

## 2020-02-07 RX ADMIN — POLYETHYLENE GLYCOL 3350 17 G: 17 POWDER, FOR SOLUTION ORAL at 08:00

## 2020-02-07 RX ADMIN — MONTELUKAST SODIUM 10 MG: 10 TABLET, FILM COATED ORAL at 21:35

## 2020-02-07 RX ADMIN — MEXILETINE HYDROCHLORIDE 150 MG: 150 CAPSULE ORAL at 17:08

## 2020-02-07 RX ADMIN — MEXILETINE HYDROCHLORIDE 150 MG: 150 CAPSULE ORAL at 08:01

## 2020-02-07 RX ADMIN — HEPARIN SODIUM 5000 UNITS: 5000 INJECTION INTRAVENOUS; SUBCUTANEOUS at 14:58

## 2020-02-07 RX ADMIN — SODIUM CHLORIDE TAB 1 GM 1 G: 1 TAB at 07:53

## 2020-02-07 RX ADMIN — IVABRADINE 5 MG: 5 TABLET, FILM COATED ORAL at 07:54

## 2020-02-07 RX ADMIN — LIDOCAINE 1 PATCH: 50 PATCH TOPICAL at 12:21

## 2020-02-07 RX ADMIN — FUROSEMIDE 10 MG: 20 TABLET ORAL at 14:58

## 2020-02-07 RX ADMIN — HEPARIN SODIUM 5000 UNITS: 5000 INJECTION INTRAVENOUS; SUBCUTANEOUS at 05:29

## 2020-02-07 RX ADMIN — LEVOTHYROXINE SODIUM 88 MCG: 88 TABLET ORAL at 05:28

## 2020-02-07 RX ADMIN — MIDODRINE HYDROCHLORIDE 10 MG: 5 TABLET ORAL at 17:11

## 2020-02-07 RX ADMIN — MIDODRINE HYDROCHLORIDE 10 MG: 5 TABLET ORAL at 12:19

## 2020-02-07 NOTE — PROGRESS NOTES
Progress Note - Palliative & Supportive Care  Marquis Ramos  49 y o   female  PPHP 519/PPHP 519-01   MRN: [id number]  Encounter: [id number]     ASSESSMENT:    Patient Active Problem List   Diagnosis    SOB (shortness of breath)    Ventricular tachycardia (HCC)    Chronic atrial fibrillation    Metastatic breast cancer (HCC)    Elevated LFTs    Chronic systolic heart failure ([facility] [address] )    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[facility] )    Type 2 diabetes mellitus without complication, without long-term current use of insulin (HCC)    Hyponatremia    Palliative care patient    Hepatorenal syndrome ([facility] )    Anemia     PLAN:    1  Symptom management:   Continue fentanyl patch 12/5mcg/hr   Continue PRN fentanyl 25mcg IV (1 used in past 24h), PRN oxyIR 5mg (1 used in past 24h)   Continue lidocaine patch x2   Continue gabapentin 100mg QHS   Continue Miralax   Continue PRN Zofran    2  Goals:    Patient wishes to remain LOC1 until she can get to [place] and enroll in hospice there  We appreciate the opportunity to participate in this patient's care  We will continue to follow  Please do not hesitate to contact our on-call provider through our clinic answering service at [phone] should you have acute symptom control concerns  INTERVAL HISTORY:    Patient is in good spirits today  She feels her pain is appropriately managed on her current regimen, and was able to have a large bowel movement today  She does not wish to take medications other than Miralax for constipation as she feels that is sufficient      MEDICATIONS / ALLERGIES:  all current active meds have been reviewed and current meds:   Current Facility-Administered Medications   Medication Dose Route Frequency    aspirin (ECOTRIN LOW STRENGTH) EC tablet 81 mg  81 mg Oral Daily    fentaNYL (DURAGESIC) 12 mcg/hr TD 72 hr patch 12 mcg  12 mcg Transdermal Q72H    fentanyl citrate (PF) 100 MCG/2ML 25 mcg  25 mcg Intravenous Q2H PRN    furosemide (LASIX) tablet 10 mg  10 mg Oral Daily    gabapentin (NEURONTIN) capsule 100 mg  100 mg Oral HS    heparin (porcine) subcutaneous injection 5,000 Units  5,000 Units Subcutaneous Q8H [facility]    insulin lispro (HumaLOG) 100 units/mL subcutaneous injection 1-5 Units  1-5 Units Subcutaneous 4x Daily (AC & HS)    ivabradine HCl (CORLANOR) tablet 5 mg  5 mg Oral BID With Meals    levothyroxine tablet 88 mcg  88 mcg Oral Early Morning    lidocaine (LIDODERM) 5 % patch 1 patch  1 patch Topical Daily    lidocaine (LIDODERM) 5 % patch 1 patch  1 patch Topical Daily    mexiletine (MEXITIL) capsule 150 mg  150 mg Oral TID    midodrine (PROAMATINE) tablet 10 mg  10 mg Oral TID AC    montelukast (SINGULAIR) tablet 10 mg  10 mg Oral HS    ondansetron (ZOFRAN) injection 4 mg  4 mg Intravenous Q4H PRN    oxyCODONE (ROXICODONE) IR tablet 5 mg  5 mg Oral Q4H PRN    pantoprazole (PROTONIX) EC tablet 40 mg  40 mg Oral Early Morning    polyethylene glycol (MIRALAX) packet 17 g  17 g Oral Daily    sodium chloride tablet 2 g  2 g Oral BID With Meals    warfarin (COUMADIN) tablet 2 5 mg  2 5 mg Oral Daily (warfarin)       Allergies   Allergen Reactions    Ambien [Zolpidem]     Entresto [Sacubitril-Valsartan] Other (See Comments)     Hypotension     Other      Other reaction(s): Unknown  Adhesive Tape    Bee Pollen Rash     Pollen       OBJECTIVE:  /62 (BP Location: Left arm)   Pulse 71   Temp 97 8 °F (36 6 °C) (Oral)   Resp 18   Ht 5' 8" (1 727 m)   Wt 72 7 kg (160 lb 4 4 oz)   SpO2 94%   BMI 24 37 kg/m²   Physical Exam:  Constitutional: Adult female, chronically ill-appearing  In no acute physical or emotional distress  Head: Normocephalic and atraumatic  Eyes: EOM are normal  No ocular discharge  Scleral icterus noted  Neck: No visible adenopathy or masses  Respiratory: Effort normal  No stridor  No respiratory distress  Gastrointestinal: No abdominal distension  Musculoskeletal: No edema     Neurological: Alert, oriented and appropriately conversant  Skin: Dry, no diaphoresis  Psychiatric: Displays a normal mood and affect  Behavior, judgement and thought content appear normal      Counseling / Coordination of Care: Total floor / unit time spent today 20 minutes  Greater than 50% of total time was spent with the patient and / or family counseling and / or coordination of care  A description of the counseling / coordination of care: chart review, symptom assessment + management, psychosocial support       Rosario Floyd MD  [address] and Supportive Care

## 2020-02-07 NOTE — PLAN OF CARE
Problem: Potential for Falls  Goal: Patient will remain free of falls  Description  INTERVENTIONS:  - Assess patient frequently for physical needs  -  Identify cognitive and physical deficits and behaviors that affect risk of falls    -  [place] fall precautions as indicated by assessment   - Educate patient/family on patient safety including physical limitations  - Instruct patient to call for assistance with activity based on assessment  - Modify environment to reduce risk of injury  - Consider OT/PT consult to assist with strengthening/mobility  Outcome: Progressing     Problem: PAIN - ADULT  Goal: Verbalizes/displays adequate comfort level or baseline comfort level  Description  Interventions:  - Encourage patient to monitor pain and request assistance  - Assess pain using appropriate pain scale  - Administer analgesics based on type and severity of pain and evaluate response  - Implement non-pharmacological measures as appropriate and evaluate response  - Consider cultural and social influences on pain and pain management  - Notify physician/advanced practitioner if interventions unsuccessful or patient reports new pain  Outcome: Progressing     Problem: INFECTION - ADULT  Goal: Absence or prevention of progression during hospitalization  Description  INTERVENTIONS:  - Assess and monitor for signs and symptoms of infection  - Monitor lab/diagnostic results  - Monitor all insertion sites, i e  indwelling lines, tubes, and drains  - Monitor endotracheal if appropriate and nasal secretions for changes in amount and color  - [place] appropriate cooling/warming therapies per order  - Administer medications as ordered  - Instruct and encourage patient and family to use good hand hygiene technique  - Identify and instruct in appropriate isolation precautions for identified infection/condition  Outcome: Progressing     Problem: SAFETY ADULT  Goal: Patient will remain free of falls  Description  INTERVENTIONS:  - Assess patient frequently for physical needs  -  Identify cognitive and physical deficits and behaviors that affect risk of falls    -  [place] fall precautions as indicated by assessment   - Educate patient/family on patient safety including physical limitations  - Instruct patient to call for assistance with activity based on assessment  - Modify environment to reduce risk of injury  - Consider OT/PT consult to assist with strengthening/mobility  Outcome: Progressing     Problem: RESPIRATORY - ADULT  Goal: Achieves optimal ventilation and oxygenation  Description  INTERVENTIONS:  - Assess for changes in respiratory status  - Assess for changes in mentation and behavior  - Position to facilitate oxygenation and minimize respiratory effort  - Oxygen administered by appropriate delivery if ordered  - Initiate smoking cessation education as indicated  - Encourage broncho-pulmonary hygiene including cough, deep breathe, Incentive Spirometry  - Assess the need for suctioning and aspirate as needed  - Assess and instruct to report SOB or any respiratory difficulty  - Respiratory Therapy support as indicated  Outcome: Progressing     Problem: SKIN/TISSUE INTEGRITY - ADULT  Goal: Skin integrity remains intact  Description  INTERVENTIONS  - Identify patients at risk for skin breakdown  - Assess and monitor skin integrity  - Assess and monitor nutrition and hydration status  - Monitor labs (i e  albumin)  - Assess for incontinence   - Turn and reposition patient  - Assist with mobility/ambulation  - Relieve pressure over bony prominences  - Avoid friction and shearing  - Provide appropriate hygiene as needed including keeping skin clean and dry  - Evaluate need for skin moisturizer/barrier cream  - Collaborate with interdisciplinary team (i e  Nutrition, Rehabilitation, etc )   - Patient/family teaching  Outcome: Progressing     Problem: HEMATOLOGIC - ADULT  Goal: Maintains hematologic stability  Description  INTERVENTIONS  - Assess for signs and symptoms of bleeding or hemorrhage  - Monitor labs  - Administer supportive blood products/factors as ordered and appropriate  Outcome: Progressing     Problem: METABOLIC, FLUID AND ELECTROLYTES - ADULT  Goal: Electrolytes maintained within normal limits  Description  INTERVENTIONS:  - Monitor labs and assess patient for signs and symptoms of electrolyte imbalances  - Administer electrolyte replacement as ordered  - Monitor response to electrolyte replacements, including repeat lab results as appropriate  - Instruct patient on fluid and nutrition as appropriate  Outcome: Progressing  Goal: Fluid balance maintained  Description  INTERVENTIONS:  - Monitor labs   - Monitor I/O and WT  - Instruct patient on fluid and nutrition as appropriate  - Assess for signs & symptoms of volume excess or deficit  Outcome: Progressing  Goal: Glucose maintained within target range  Description  INTERVENTIONS:  - Monitor Blood Glucose as ordered  - Assess for signs and symptoms of hyperglycemia and hypoglycemia  - Administer ordered medications to maintain glucose within target range  - Assess nutritional intake and initiate nutrition service referral as needed  Outcome: Progressing     Problem: Prexisting or High Potential for Compromised Skin Integrity  Goal: Skin integrity is maintained or improved  Description  INTERVENTIONS:  - Identify patients at risk for skin breakdown  - Assess and monitor skin integrity  - Assess and monitor nutrition and hydration status  - Monitor labs   - Assess for incontinence   - Turn and reposition patient  - Assist with mobility/ambulation  - Relieve pressure over bony prominences  - Avoid friction and shearing  - Provide appropriate hygiene as needed including keeping skin clean and dry  - Evaluate need for skin moisturizer/barrier cream  - Collaborate with interdisciplinary team   - Patient/family teaching  - Consider wound care consult Outcome: Progressing     Problem: DISCHARGE PLANNING  Goal: Discharge to home or other facility with appropriate resources  Description  INTERVENTIONS:  - Identify barriers to discharge w/patient and caregiver  - Arrange for needed discharge resources and transportation as appropriate  - Identify discharge learning needs (meds, wound care, etc )  - Arrange for interpretive services to assist at discharge as needed  - Refer to Case Management Department for coordinating discharge planning if the patient needs post-hospital services based on physician/advanced practitioner order or complex needs related to functional status, cognitive ability, or social support system  Outcome: Progressing

## 2020-02-07 NOTE — ASSESSMENT & PLAN NOTE
Sodium slightly worsening day by day  Continue fluid restriction  Continue sodium chloride tablets  Add Lasix 10 mg daily    Repeat BMP in am

## 2020-02-07 NOTE — SOCIAL WORK
Call placed to brother, Daljit Torre, to review status of [facility] referrals  Answered his questions about insurance coverages and the review process for [facility] SN[facility]'s  Explained to brother that if patient is not accepted at [facility] they will need to provide other choices  Call received from [place] [facility]  They have reviewed and patient is approved clinically and financially  She asks that CM call her Monday to coordinate discharge  She reports weather is a factor in their area since they received 8 inches of snow today

## 2020-02-07 NOTE — SOCIAL WORK
Calls placed to [address] and left message with admissions to call CM with decision on accepting the patient  Financial information had been sent to snf's via [facility]

## 2020-02-07 NOTE — SOCIAL WORK
Another call placed to [facility] at [facility] JENNY LAUREL and left message for her to call YOLIS Townsend at [facility]  She did not receive finanical information via [facility]  She requests that financial information be faxed today and it was sent to fax# [phone]  They are accepting patient clinically but concern was finances  She will review and discuss with her finance person and will call CM and leave VM as to whether they can accept patient on Monday

## 2020-02-07 NOTE — SOCIAL WORK
Called [employer] and spoke to Grady Avendano about BLS transport to [place] at [place], [place], approximately 217 miles  She gave estimate cost of $[phone] 00 and will need half placed on credit card  She is unable to schedule transport for Monday and says SLETs will need 24 hour advance to schedule so to call early Monday 2/10/20  Called brother, Lord Romero, to inform him patient approved at [facility]  Discussed above and he will use his credit card to pay for transport

## 2020-02-07 NOTE — PROGRESS NOTES
NEPHROLOGY PROGRESS NOTE   Geeta Muhammad 49 y o  female MRN: [id number]  Unit/Bed#: [facility] 519-01 Encounter: [id number]  Reason for Consult: SACHI, hyponatremia    ASSESSMENT AND PLAN:  Initial SACHI suspected to be multifactorial, likely prerenal in the setting of hypotension, blood loss anemia  -with inotropic support, blood transfusion, creatinine now significantly improving from peak level 2 4 to 0 7 today, and stable  SACHI resolved  -urine output good  -avoid nephrotoxins or NSAIDs  -avoid hypotension  -urinalysis bland without hematuria or proteinuria on 1/31/20  -CT scan without contrast shows no hydronephrosis  -status post IV albumin      Hyponatremia  -suspected to be in the setting of malignancy/SIADH  -workup includes urine sodium 81, urine osmolality 367, serum osmolality 285  -status post tolvaptan one dose with improvement in serum sodium, sodium level overall fluctuating and slightly worsened 132 today  -fluid restriction 1 5 L per day  -increase salt tablet to 2 g p o  B i d , will start Lasix 10 mg daily     Mild hypomagnesemia, serum magnesium 1 5 , status post replacement  Repeat serum magnesium in a m      Metastatic breast cancer bone involvement  -as per palliative care note, patient is considering hospice care once she moves to [place]      Hypotension, status post pressors now remains off   Blood pressure has overall improved  -status post IV albumin, now remains on midodrine   -blood culture negative so far  -echo shows EF 18%, grade 1 diastolic dysfunction, no pericardial effusion      Discussed above plan in detail with primary team   Patient will be hospice in [place], currently remains full code  SUBJECTIVE:  Patient seen and examined at bedside  No chest pain, shortness of breath, nausea, vomiting, abdominal pain      OBJECTIVE:  Current Weight: Weight - Scale: 72 7 kg (160 lb 4 4 oz)  Vitals:    02/07/20 1100   BP: 116/62   Pulse: 71   Resp: 18   Temp: 97 8 °F (36 6 °C)   SpO2: 94%       Intake/Output Summary (Last 24 hours) at 2/7/2020 1328  Last data filed at 2/7/2020 0601  Gross per 24 hour   Intake    Output 700 ml   Net -700 ml     Wt Readings from Last 3 Encounters:   02/07/20 72 7 kg (160 lb 4 4 oz)     Temp Readings from Last 3 Encounters:   02/07/20 97 8 °F (36 6 °C) (Oral)     BP Readings from Last 3 Encounters:   02/07/20 116/62     Pulse Readings from Last 3 Encounters:   02/07/20 71        Physical Examination:  General:  Lying in bed, no acute distress   Eyes:  Mild conjunctival pallor present  ENT:  External examination of ears and nose unremarkable  Neck:  No obvious lymphadenopathy appreciated  Respiratory:  Bilateral air entry present  CVS:  S1, S2 present  GI:  Soft, nontender, nondistended  CNS:  Active alert oriented x3  Extremities:  Very trace edema in legs  Skin:  No new rash in legs    Medications:    Current Facility-Administered Medications:     aspirin (ECOTRIN LOW STRENGTH) EC tablet 81 mg, 81 mg, Oral, Daily, Lev Lala PA-C, 81 mg at 02/07/20 0801    fentaNYL (DURAGESIC) 12 mcg/hr TD 72 hr patch 12 mcg, 12 mcg, Transdermal, Q72H, Shira JONO Bunnon, PA-C, 12 mcg at 02/07/20 1220    fentanyl citrate (PF) 100 MCG/2ML 25 mcg, 25 mcg, Intravenous, Q2H PRN, Lev Lala, PA-C, 25 mcg at 02/06/20 1338    gabapentin (NEURONTIN) capsule 100 mg, 100 mg, Oral, HS, Shira R Sepidehon, PA-C, 100 mg at 02/06/20 2140    heparin (porcine) subcutaneous injection 5,000 Units, 5,000 Units, Subcutaneous, Q8H [address], Lev Lala PA-C, 5,000 Units at 02/07/20 0529    insulin lispro (HumaLOG) 100 units/mL subcutaneous injection 1-5 Units, 1-5 Units, Subcutaneous, 4x Daily (AC & HS), 1 Units at 02/05/20 2129 **AND** Fingerstick Glucose (POCT), , , 4x Daily AC and at bedtime, Lev Lala PA-C    ivabradine HCl (CORLANOR) tablet 5 mg, 5 mg, Oral, BID With Meals, Tashia James PA-C, 5 mg at 02/07/20 0754    levothyroxine tablet 88 mcg, 88 mcg, Oral, Early Morning, Alfreda Garciamuson, PA-C, 88 mcg at 02/07/20 0528    lidocaine (LIDODERM) 5 % patch 1 patch, 1 patch, Topical, Daily, Alfreda Bunnon, PA-C, 1 patch at 02/07/20 1211    lidocaine (LIDODERM) 5 % patch 1 patch, 1 patch, Topical, Daily, Cedric Cardozo PA-C, 1 patch at 02/07/20 1221    mexiletine (MEXITIL) capsule 150 mg, 150 mg, Oral, TID, Alfreda Maxwell Rasmuson, PA-C, 150 mg at 02/07/20 0801    midodrine (PROAMATINE) tablet 10 mg, 10 mg, Oral, TID AC, Shira R Rasmuson, PA-C, 10 mg at 02/07/20 1219    montelukast (SINGULAIR) tablet 10 mg, 10 mg, Oral, HS, Shira R Rasmuson, PA-C, 10 mg at 02/06/20 2140    ondansetron (ZOFRAN) injection 4 mg, 4 mg, Intravenous, Q4H PRN, Alfreda Bunnon, PA-C, 4 mg at 02/06/20 1840    oxyCODONE (ROXICODONE) IR tablet 5 mg, 5 mg, Oral, Q4H PRN, Alfreda Garciamuson, PA-C, 5 mg at 02/06/20 2141    pantoprazole (PROTONIX) EC tablet 40 mg, 40 mg, Oral, Early Morning, Shira R Rasmuson, PA-C, 40 mg at 02/07/20 0528    polyethylene glycol (MIRALAX) packet 17 g, 17 g, Oral, Daily, Shira R Rasmuson, PA-C, 17 g at 02/07/20 0800    sodium chloride tablet 1 g, 1 g, Oral, BID With Meals, Lavinia Castro MD, 1 g at 02/07/20 0753    warfarin (COUMADIN) tablet 2 5 mg, 2 5 mg, Oral, Daily (warfarin), Lavinia Castro MD, 2 5 mg at 02/06/20 1824    Laboratory Results:  Results from last 7 days   Lab Units 02/07/20  0531 02/06/20  7286 02/05/20  0612 02/04/20  0543 02/03/20  0538 02/02/20  1122 02/02/20  0453 02/01/20  0545   WBC Thousand/uL 8 03 7 48 8 93 11 05* 15 72*  --  18 50* 16 13*   HEMOGLOBIN g/dL 7 5* 7 1* 7 3* 7 4* 7 8* 7 9* 6 8* 7 9*   HEMATOCRIT % 24 1* 22 6* 23 1* 22 4* 23 6*  --  20 3* 24 7*   PLATELETS Thousands/uL 166 153 165 181 205  --  212 241   SODIUM mmol/L 132* 133* 135* 133* 134*  --  130* 125*   POTASSIUM mmol/L 4 1 3 8 4 1 3 7 3 9  --  4 7 5 2   CHLORIDE mmol/L 100 101 102 101 103  --  100 93*   CO2 mmol/L 29 29 28 27 23  --  22 23 BUN mg/dL 13 11 13 13 23  --  47* 61*   CREATININE mg/dL 0 77 0 64 0 70 0 78 1 04  --  1 71* 2 43*   CALCIUM mg/dL 9 5 9 6 9 9 9 9 9 6  --  9 1 8 9   MAGNESIUM mg/dL  --   --  1 5* 1 6 1 6  --   --   --    PHOSPHORUS mg/dL  --   --  3 6  --  2 2*  --   --   --        XR chest portable   Final Result by Pema Washburn MD (02/02 1220)      No acute cardiopulmonary disease  Right IJ central line has been placed with tip at the cavoatrial junction  No pneumothorax  Workstation performed: [id number]         CT abdomen pelvis wo contrast   Final Result by Lakia Solorzano MD (01/31 1014)      Redemonstration of diffuse metastatic hepatic disease  Diffuse osseous metastatic disease  No pathologic fractures are identified  Cholelithiasis without CT evidence for acute cholecystitis  Workstation performed: [id number]         US right upper quadrant   Final Result by Pema Washburn MD (01/30 5429)      Moderate hepatomegaly, with diffusely heterogeneous nodular liver parenchyma in keeping with history of widespread metastatic disease  There is no evidence of biliary ductal dilatation  There is sludge and small gallstone within the gallbladder, without evidence of acute cholecystitis  Workstation performed: [id number]         XR chest 1 view portable   ED Interpretation by Wm Colon DO (01/29 1316)   No acute cardiopulmonary disease      Final Result by Hermelinda Landau, MD (01/29 1353)   Typical left-sided ICD      No acute cardiopulmonary disease  Findings are consistent with emergency provider's preliminary reading                     Workstation performed: [id number]             Portions of the record may have been created with voice recognition software  Occasional wrong word or "sound a like" substitutions may have occurred due to the inherent limitations of voice recognition software   Read the chart carefully and recognize, using context, where substitutions have occurred

## 2020-02-07 NOTE — ASSESSMENT & PLAN NOTE
Likely multifactorial including chronic disease/malignancy  No signs of acute bleeding at this time    Stable

## 2020-02-07 NOTE — ASSESSMENT & PLAN NOTE
No results found for: HGBA1C    Recent Labs     02/06/20  1650 02/06/20  2152 02/07/20  0618 02/07/20  1033   POCGLU 132 123 111 157*       Blood Sugar Average: Last 72 hrs:  (P) 142 [id number]  SSI, accuchecks

## 2020-02-07 NOTE — PROGRESS NOTES
Progress Note - Judson Becerra 1965, 49 y o  female MRN: [id number]    Unit/Bed#: [facility] 827-39 Encounter: [id number]    Primary Care Provider: Josie Alaniz MD   Date and time admitted to hospital: 1/29/2020 11:33 AM        Anemia  Assessment & Plan  Likely multifactorial including chronic disease/malignancy  No signs of acute bleeding at this time  Stable    Hyponatremia  Assessment & Plan  Sodium slightly worsening day by day  Continue fluid restriction  Continue sodium chloride tablets  Add Lasix 10 mg daily    Repeat BMP in am    Type 2 diabetes mellitus without complication, without long-term current use of insulin [facility])  Assessment & Plan  No results found for: HGBA1C    Recent Labs     02/06/20  1650 02/06/20  2152 02/07/20  0618 02/07/20  1033   POCGLU 132 123 111 157*       Blood Sugar Average: Last 72 hrs:  (P) 142 [id number]  SSI, accuchecks    Metastatic breast cancer ([facility] [address] )  Assessment & Plan  With metastasis to bone and liver, ongoing low back pain  Evaluated by Oncology, recommendation for follow-up with her oncologist  Poor prognosis  Palliative Care is following  Patient currently like to continue with full code and with above treatment    Chronic atrial fibrillation  Assessment & Plan  Rate controlled  Continue corlanor  Digoxin held  Continue warfarin at 2 5 mg  Monitor INR    Ventricular tachycardia (HCC)  Assessment & Plan  No arrhythmia on ICD interrogation  Stable on corelanor      * SOB (shortness of breath)  Assessment & Plan  · Patient states that prior shortness of breath has been associated with V-tach in the past, as per EMS she had 5 beat run of V-tach  · Chest x-ray shows no acute cardiopulmonary pathology  · Cardiac echo with EF 36%  · Evaluated by Cardiology, no arrhythmia on ICD interrogation, no sign of CHF  · Resolved        VTE Pharmacologic Prophylaxis:   Pharmacologic: Warfarin (Coumadin)  Mechanical VTE Prophylaxis in Place: Yes    Patient Centered Rounds: I have performed bedside rounds with nursing staff today  Discussions with Specialists or Other Care Team Provider:  Nephrology    Education and Discussions with Family / Patient:  Patient, plan of care    Time Spent for Care: 20 minutes  More than 50% of total time spent on counseling and coordination of care as described above  Current Length of Stay: 9 day(s)    Current Patient Status: Inpatient   Certification Statement: The patient will continue to require additional inpatient hospital stay due to Discharge Plan    Discharge Plan:  SNF, awaiting bed    Code Status: Level 1 - Full Code      Subjective:   Denies any acute complaints today  Positive BM today  Tolerating diet  Requesting to have oxygen at night while she sleeps  Objective:     Vitals:   Temp (24hrs), Av 6 °F (37 °C), Min:97 8 °F (36 6 °C), Max:99 4 °F (37 4 °C)    Temp:  [97 8 °F (36 6 °C)-99 4 °F (37 4 °C)] 97 8 °F (36 6 °C)  HR:  [71-75] 71  Resp:  [16-18] 18  BP: (109-121)/(47-71) 116/62  SpO2:  [90 %-95 %] 94 %  Body mass index is 24 37 kg/m²  Input and Output Summary (last 24 hours): Intake/Output Summary (Last 24 hours) at 2020 1642  Last data filed at 2020 1300  Gross per 24 hour   Intake 180 ml   Output 700 ml   Net -520 ml       Physical Exam:     Physical Exam   Constitutional: She is oriented to person, place, and time  Thin elderly female, lying in bed, appears comfortable   HENT:   Head: Normocephalic and atraumatic  Eyes: Pupils are equal, round, and reactive to light  EOM are normal    Neck: Neck supple  Cardiovascular: Normal rate and regular rhythm  Pulmonary/Chest: Effort normal    Abdominal: Soft  Musculoskeletal: She exhibits no edema  Neurological: She is alert and oriented to person, place, and time  Skin: Skin is warm and dry  Psychiatric: She has a normal mood and affect   Her behavior is normal          Additional Data:     Labs:    Results from last 7 days   Lab Units 02/07/20  0531  02/03/20  0538  02/02/20  0453   WBC Thousand/uL 8 03   < > 15 72*  --  18 50*   HEMOGLOBIN g/dL 7 5*   < > 7 8*   < > 6 8*   HEMATOCRIT % 24 1*   < > 23 6*  --  20 3*   PLATELETS Thousands/uL 166   < > 205  --  212   BANDS PCT %  --   --   --   --  1   NEUTROS PCT %  --   --  81*  --   --    LYMPHS PCT %  --   --  7*  --   --    LYMPHO PCT %  --   --   --   --  5*   MONOS PCT %  --   --  10  --   --    MONO PCT %  --   --   --   --  4   EOS PCT %  --   --  0  --  2    < > = values in this interval not displayed  Results from last 7 days   Lab Units 02/07/20  0531 02/06/20  0605   SODIUM mmol/L 132* 133*   POTASSIUM mmol/L 4 1 3 8   CHLORIDE mmol/L 100 101   CO2 mmol/L 29 29   BUN mg/dL 13 11   CREATININE mg/dL 0 77 0 64   ANION GAP mmol/L 3* 3*   CALCIUM mg/dL 9 5 9 6   ALBUMIN g/dL  --  2 0*   TOTAL BILIRUBIN mg/dL  --  5 30*   ALK PHOS U/L  --  901*   ALT U/L  --  85*   AST U/L  --  137*   GLUCOSE RANDOM mg/dL 113 112     Results from last 7 days   Lab Units 02/07/20  0531   INR  1 24*     Results from last 7 days   Lab Units 02/07/20  1033 02/07/20  0618 02/06/20  2152 02/06/20  1650 02/06/20  1112 02/06/20  0616 02/05/20  2116 02/05/20  1600 02/05/20  1107 02/05/20  0616 02/04/20  2029 02/04/20  1629   POC GLUCOSE mg/dl 157* 111 123 132 136 121 158* 136 175* 124 153* 146*                   * I Have Reviewed All Lab Data Listed Above  * Additional Pertinent Lab Tests Reviewed: All Labs Within Last 24 Hours Reviewed      Recent Cultures (last 7 days):     Results from last 7 days   Lab Units 02/01/20  0546   BLOOD CULTURE  No Growth After 5 Days  No Growth After 5 Days         Last 24 Hours Medication List:     Current Facility-Administered Medications:  aspirin 81 mg Oral Daily Laila Lala PA-C   fentaNYL 12 mcg Transdermal Q72H Shiracandi Lala PA-C   fentanyl citrate (PF) 25 mcg Intravenous Q2H PRN Laila Lala PA-C   furosemide 10 mg Oral Daily Munira Hand MD gabapentin 100 mg Oral HS Shira R Dai, PA-C   heparin (porcine) 5,000 Units Subcutaneous Q8H [facility] Shira R BUCKY Lala-C   insulin lispro 1-5 Units Subcutaneous 4x Daily (AC & HS) Romeltte Expose Dai PA-C   ivabradine HCl 5 mg Oral BID With Meals BUCKY Hamilton-C   levothyroxine 88 mcg Oral Early Morning Dewitte Expose Dai, PA-C   lidocaine 1 patch Topical Daily Dewitte Expose Dai, PA-C   lidocaine 1 patch Topical Daily Jamison Castiti PA-C   mexiletine 150 mg Oral TID Jamison CasBUCKY walls-C   midodrine 10 mg Oral TID AC Shira R Dai, PA-C   montelukast 10 mg Oral HS Shira R Dai PA-C   ondansetron 4 mg Intravenous Q4H PRN Jamison Rex, PA-C   oxyCODONE 5 mg Oral Q4H PRN Dewitte Expose Dai PA-C   pantoprazole 40 mg Oral Early Morning Shira R Dai, PA-C   polyethylene glycol 17 g Oral Daily Shira R Dai, PA-C   sodium chloride 2 g Oral BID With Meals Matthew Louis MD   warfarin 2 5 mg Oral Daily (warfarin) Terence Acosta MD        Today, Patient Was Seen By: Brandin Adamson MD    ** Please Note: Dictation voice to text software may have been used in the creation of this document   **

## 2020-02-08 LAB
ANION GAP SERPL CALCULATED.3IONS-SCNC: 6 MMOL/L (ref 4–13)
BUN SERPL-MCNC: 11 MG/DL (ref 5–25)
CALCIUM SERPL-MCNC: 9.7 MG/DL (ref 8.3–10.1)
CHLORIDE SERPL-SCNC: 101 MMOL/L (ref 100–108)
CO2 SERPL-SCNC: 28 MMOL/L (ref 21–32)
CREAT SERPL-MCNC: 0.62 MG/DL (ref 0.6–1.3)
GFR SERPL CREATININE-BSD FRML MDRD: 103 ML/MIN/1.73SQ M
GLUCOSE SERPL-MCNC: 120 MG/DL (ref 65–140)
GLUCOSE SERPL-MCNC: 143 MG/DL (ref 65–140)
GLUCOSE SERPL-MCNC: 181 MG/DL (ref 65–140)
GLUCOSE SERPL-MCNC: 80 MG/DL (ref 65–140)
GLUCOSE SERPL-MCNC: 81 MG/DL (ref 65–140)
POTASSIUM SERPL-SCNC: 3.6 MMOL/L (ref 3.5–5.3)
SODIUM SERPL-SCNC: 135 MMOL/L (ref 136–145)

## 2020-02-08 PROCEDURE — 82948 REAGENT STRIP/BLOOD GLUCOSE: CPT

## 2020-02-08 PROCEDURE — 99232 SBSQ HOSP IP/OBS MODERATE 35: CPT | Performed by: INTERNAL MEDICINE

## 2020-02-08 PROCEDURE — 80048 BASIC METABOLIC PNL TOTAL CA: CPT | Performed by: INTERNAL MEDICINE

## 2020-02-08 PROCEDURE — 94762 N-INVAS EAR/PLS OXIMTRY CONT: CPT

## 2020-02-08 RX ORDER — POTASSIUM CHLORIDE 20 MEQ/1
20 TABLET, EXTENDED RELEASE ORAL ONCE
Status: COMPLETED | OUTPATIENT
Start: 2020-02-08 | End: 2020-02-08

## 2020-02-08 RX ORDER — DIPHENHYDRAMINE HCL 25 MG
25 TABLET ORAL EVERY 6 HOURS PRN
Status: DISCONTINUED | OUTPATIENT
Start: 2020-02-08 | End: 2020-02-11 | Stop reason: HOSPADM

## 2020-02-08 RX ADMIN — LEVOTHYROXINE SODIUM 88 MCG: 88 TABLET ORAL at 05:09

## 2020-02-08 RX ADMIN — MIDODRINE HYDROCHLORIDE 10 MG: 5 TABLET ORAL at 15:39

## 2020-02-08 RX ADMIN — MEXILETINE HYDROCHLORIDE 150 MG: 150 CAPSULE ORAL at 15:42

## 2020-02-08 RX ADMIN — LIDOCAINE 1 PATCH: 50 PATCH TOPICAL at 09:37

## 2020-02-08 RX ADMIN — MIDODRINE HYDROCHLORIDE 10 MG: 5 TABLET ORAL at 12:08

## 2020-02-08 RX ADMIN — PANTOPRAZOLE SODIUM 40 MG: 40 TABLET, DELAYED RELEASE ORAL at 05:09

## 2020-02-08 RX ADMIN — MEXILETINE HYDROCHLORIDE 150 MG: 150 CAPSULE ORAL at 21:22

## 2020-02-08 RX ADMIN — Medication 2 G: at 09:35

## 2020-02-08 RX ADMIN — METHYLNALTREXONE BROMIDE 8 MG: 8 INJECTION, SOLUTION SUBCUTANEOUS at 15:40

## 2020-02-08 RX ADMIN — ONDANSETRON 4 MG: 2 INJECTION INTRAMUSCULAR; INTRAVENOUS at 10:54

## 2020-02-08 RX ADMIN — ASPIRIN 81 MG: 81 TABLET ORAL at 09:35

## 2020-02-08 RX ADMIN — DIPHENHYDRAMINE HCL 25 MG: 25 TABLET, COATED ORAL at 01:13

## 2020-02-08 RX ADMIN — ONDANSETRON 4 MG: 2 INJECTION INTRAMUSCULAR; INTRAVENOUS at 21:23

## 2020-02-08 RX ADMIN — HEPARIN SODIUM 5000 UNITS: 5000 INJECTION INTRAVENOUS; SUBCUTANEOUS at 21:23

## 2020-02-08 RX ADMIN — MEXILETINE HYDROCHLORIDE 150 MG: 150 CAPSULE ORAL at 09:38

## 2020-02-08 RX ADMIN — INSULIN LISPRO 1 UNITS: 100 INJECTION, SOLUTION INTRAVENOUS; SUBCUTANEOUS at 13:30

## 2020-02-08 RX ADMIN — Medication 2 G: at 18:32

## 2020-02-08 RX ADMIN — WARFARIN SODIUM 2.5 MG: 2.5 TABLET ORAL at 18:32

## 2020-02-08 RX ADMIN — GABAPENTIN 100 MG: 100 CAPSULE ORAL at 21:22

## 2020-02-08 RX ADMIN — IVABRADINE 5 MG: 5 TABLET, FILM COATED ORAL at 15:39

## 2020-02-08 RX ADMIN — FUROSEMIDE 10 MG: 20 TABLET ORAL at 09:36

## 2020-02-08 RX ADMIN — MONTELUKAST SODIUM 10 MG: 10 TABLET, FILM COATED ORAL at 21:23

## 2020-02-08 RX ADMIN — HEPARIN SODIUM 5000 UNITS: 5000 INJECTION INTRAVENOUS; SUBCUTANEOUS at 05:09

## 2020-02-08 RX ADMIN — HEPARIN SODIUM 5000 UNITS: 5000 INJECTION INTRAVENOUS; SUBCUTANEOUS at 13:30

## 2020-02-08 RX ADMIN — MIDODRINE HYDROCHLORIDE 10 MG: 5 TABLET ORAL at 09:36

## 2020-02-08 RX ADMIN — OXYCODONE HYDROCHLORIDE 5 MG: 5 TABLET ORAL at 21:22

## 2020-02-08 RX ADMIN — POTASSIUM CHLORIDE 20 MEQ: 1500 TABLET, EXTENDED RELEASE ORAL at 09:37

## 2020-02-08 NOTE — ASSESSMENT & PLAN NOTE
Sodium slightly worsening day by day  Continue fluid restriction  Continue sodium chloride tablets  Continue Lasix 10 mg daily    Improved

## 2020-02-08 NOTE — PROGRESS NOTES
Progress Note - Chai Avina 1965, 49 y o  female MRN: [id number]    [facility] Encounter: [id number]    Primary Care Provider: David Temple MD   Date and time admitted to hospital: 1/29/2020 11:33 AM        Anemia  Assessment & Plan  Likely multifactorial including chronic disease/malignancy  No signs of acute bleeding at this time  Stable    Hyponatremia  Assessment & Plan  Sodium slightly worsening day by day  Continue fluid restriction  Continue sodium chloride tablets  Continue Lasix 10 mg daily    Improved    Type 2 diabetes mellitus without complication, without long-term current use of insulin [facility])  Assessment & Plan  No results found for: HGBA1C    Recent Labs     02/07/20  2138 02/08/20  0616 02/08/20  1032 02/08/20  1636   POCGLU 215* 81 181* 120       Blood Sugar Average: Last 72 hrs:  (P) 442 [id number]  SSI, accuchecks    Cardiomyopathy secondary to drug [facility])  Assessment & Plan  Nonischemic cardiomyopathy, previous EF 25%,  now 36%  Secondary to chemotherapy       Chronic systolic heart failure (HCC)  Assessment & Plan  Wt Readings from Last 3 Encounters:   02/07/20 72 7 kg (160 lb 4 4 oz)     EF 36%  Stable at this time without shortness of breath  Monitor daily weights  Monitor volume status    Elevated LFTs  Assessment & Plan  Likely related to worsening metastasis liver disease  Jaundice  Liver ultrasound with widespread metastatic disease  Abdominal CT scan with pneumobilia  Evaluated by GI, no biliary obstruction  Continue supportive care        Metastatic breast cancer ([facility] [address] )  Assessment & Plan  With metastasis to bone and liver, ongoing low back pain  Evaluated by Oncology, recommendation for follow-up with her oncologist  Poor prognosis  Palliative Care is following  Patient currently like to continue with full code and with above treatment    Chronic atrial fibrillation  Assessment & Plan  Rate controlled  Continue corlanor  Digoxin held  Continue warfarin at 2 5 mg  Monitor INR    Ventricular tachycardia (HCC)  Assessment & Plan  No arrhythmia on ICD interrogation  Stable on corelanor      * SOB (shortness of breath)  Assessment & Plan  · Patient states that prior shortness of breath has been associated with V-tach in the past, as per EMS she had 5 beat run of V-tach  · Chest x-ray shows no acute cardiopulmonary pathology  · Cardiac echo with EF 36%  · Evaluated by Cardiology, no arrhythmia on ICD interrogation, no sign of CHF  · Resolved          VTE Pharmacologic Prophylaxis:   Pharmacologic: Warfarin (Coumadin)  Mechanical VTE Prophylaxis in Place: Yes    Patient Centered Rounds: I have performed bedside rounds with nursing staff today  Education and Discussions with Family / Patient:  Patient, plan of care    Time Spent for Care: 20 minutes  More than 50% of total time spent on counseling and coordination of care as described above  Current Length of Stay: 10 day(s)    Current Patient Status: Inpatient   Certification Statement: The patient will continue to require additional inpatient hospital stay due to Discharge Plan    Discharge Plan:  SNF on Monday    Code Status: Level 1 - Full Code      Subjective:   Denies any acute complaints  Objective:     Vitals:   Temp (24hrs), Av 5 °F (36 9 °C), Min:98 3 °F (36 8 °C), Max:98 7 °F (37 1 °C)    Temp:  [98 3 °F (36 8 °C)-98 7 °F (37 1 °C)] 98 3 °F (36 8 °C)  HR:  [75-80] 80  Resp:  [16-18] 18  BP: (116-135)/(55-64) 117/56  SpO2:  [90 %-98 %] 98 %  Body mass index is 24 37 kg/m²  Input and Output Summary (last 24 hours): Intake/Output Summary (Last 24 hours) at 2020 1808  Last data filed at 2020 1500  Gross per 24 hour   Intake 240 ml   Output 900 ml   Net -660 ml       Physical Exam:     Physical Exam   Constitutional: She is oriented to person, place, and time  She appears well-developed and well-nourished  HENT:   Head: Normocephalic and atraumatic     Eyes: Pupils are equal, round, and reactive to light  EOM are normal    Neck: Neck supple  Pulmonary/Chest: Effort normal    Neurological: She is alert and oriented to person, place, and time  Psychiatric: She has a normal mood and affect  Her behavior is normal      Additional Data:     Labs:    Results from last 7 days   Lab Units 02/07/20  0531  02/03/20  0538  02/02/20  0453   WBC Thousand/uL 8 03   < > 15 72*  --  18 50*   HEMOGLOBIN g/dL 7 5*   < > 7 8*   < > 6 8*   HEMATOCRIT % 24 1*   < > 23 6*  --  20 3*   PLATELETS Thousands/uL 166   < > 205  --  212   BANDS PCT %  --   --   --   --  1   NEUTROS PCT %  --   --  81*  --   --    LYMPHS PCT %  --   --  7*  --   --    LYMPHO PCT %  --   --   --   --  5*   MONOS PCT %  --   --  10  --   --    MONO PCT %  --   --   --   --  4   EOS PCT %  --   --  0  --  2    < > = values in this interval not displayed  Results from last 7 days   Lab Units 02/08/20  0509  02/06/20  0605   SODIUM mmol/L 135*   < > 133*   POTASSIUM mmol/L 3 6   < > 3 8   CHLORIDE mmol/L 101   < > 101   CO2 mmol/L 28   < > 29   BUN mg/dL 11   < > 11   CREATININE mg/dL 0 62   < > 0 64   ANION GAP mmol/L 6   < > 3*   CALCIUM mg/dL 9 7   < > 9 6   ALBUMIN g/dL  --   --  2 0*   TOTAL BILIRUBIN mg/dL  --   --  5 30*   ALK PHOS U/L  --   --  901*   ALT U/L  --   --  85*   AST U/L  --   --  137*   GLUCOSE RANDOM mg/dL 80   < > 112    < > = values in this interval not displayed  Results from last 7 days   Lab Units 02/07/20  0531   INR  1 24*     Results from last 7 days   Lab Units 02/08/20  1636 02/08/20  1032 02/08/20  0616 02/07/20  2138 02/07/20  1648 02/07/20  1033 02/07/20  0618 02/06/20  2152 02/06/20  1650 02/06/20  1112 02/06/20  0616 02/05/20 2116   POC GLUCOSE mg/dl 120 181* 81 215* 126 157* 111 123 132 136 121 158*                   * I Have Reviewed All Lab Data Listed Above  * Additional Pertinent Lab Tests Reviewed:  All Labs Within Last 24 Hours Reviewed        Recent Cultures (last 7 days): Last 24 Hours Medication List:     Current Facility-Administered Medications:  aspirin 81 mg Oral Daily Shahbaz Lala PA-C   diphenhydrAMINE 25 mg Oral Q6H PRN Robe Castaneda PA-C   fentaNYL 12 mcg Transdermal Q72H Shira Lala PA-C   fentanyl citrate (PF) 25 mcg Intravenous Q2H PRN Dal Killings, PA-C   furosemide 10 mg Oral Daily Morna Councilman, MD   gabapentin 100 mg Oral HS Shira Lala PA-C   heparin (porcine) 5,000 Units Subcutaneous Q8H [facility] Shira Lala PA-C   insulin lispro 1-5 Units Subcutaneous 4x Daily (AC & HS) Shahbaz Lala PA-C   ivabradine HCl 5 mg Oral BID With Meals Dal Killings, PA-MYLES   levothyroxine 88 mcg Oral Early Morning Shahbaz Lala PA-MYLES   lidocaine 1 patch Topical Daily [place] Kyung Lala, PA-MYLES   lidocaine 1 patch Topical Daily Jatin Killings, PA-C   mexiletine 150 mg Oral TID Dal Killings, PA-C   midodrine 10 mg Oral TID AC Shira JONO Lala, PA-C   montelukast 10 mg Oral HS BUCKY Reddy-C   ondansetron 4 mg Intravenous Q4H PRN Dal Killings, PA-C   oxyCODONE 5 mg Oral Q4H PRN [place] Lala, PA-C   pantoprazole 40 mg Oral Early Morning Shira BUCKY Palomares-C   polyethylene glycol 17 g Oral Daily Shira R Dai, PA-C   sodium chloride 2 g Oral BID With Meals Morna Councilman, MD   warfarin 2 5 mg Oral Daily (warfarin) Peggy Queen MD        Today, Patient Was Seen By: Parviz Gong MD    ** Please Note: Dictation voice to text software may have been used in the creation of this document   **

## 2020-02-08 NOTE — ASSESSMENT & PLAN NOTE
No results found for: HGBA1C    Recent Labs     02/07/20  2138 02/08/20  0616 02/08/20  1032 02/08/20  1636   POCGLU 215* 81 181* 120       Blood Sugar Average: Last 72 hrs:  (P) 146 [id number]  WOLF, accuchecks

## 2020-02-08 NOTE — ASSESSMENT & PLAN NOTE
Wt Readings from Last 3 Encounters:   02/07/20 72 7 kg (160 lb 4 4 oz)     EF 36%  Stable at this time without shortness of breath  Monitor daily weights  Monitor volume status

## 2020-02-08 NOTE — PLAN OF CARE
Problem: Potential for Falls  Goal: Patient will remain free of falls  Description  INTERVENTIONS:  - Assess patient frequently for physical needs  -  Identify cognitive and physical deficits and behaviors that affect risk of falls    -  [place] fall precautions as indicated by assessment   - Educate patient/family on patient safety including physical limitations  - Instruct patient to call for assistance with activity based on assessment  - Modify environment to reduce risk of injury  - Consider OT/PT consult to assist with strengthening/mobility  Outcome: Progressing     Problem: PAIN - ADULT  Goal: Verbalizes/displays adequate comfort level or baseline comfort level  Description  Interventions:  - Encourage patient to monitor pain and request assistance  - Assess pain using appropriate pain scale  - Administer analgesics based on type and severity of pain and evaluate response  - Implement non-pharmacological measures as appropriate and evaluate response  - Consider cultural and social influences on pain and pain management  - Notify physician/advanced practitioner if interventions unsuccessful or patient reports new pain  Outcome: Progressing     Problem: INFECTION - ADULT  Goal: Absence or prevention of progression during hospitalization  Description  INTERVENTIONS:  - Assess and monitor for signs and symptoms of infection  - Monitor lab/diagnostic results  - Monitor all insertion sites, i e  indwelling lines, tubes, and drains  - Monitor endotracheal if appropriate and nasal secretions for changes in amount and color  - [place] appropriate cooling/warming therapies per order  - Administer medications as ordered  - Instruct and encourage patient and family to use good hand hygiene technique  - Identify and instruct in appropriate isolation precautions for identified infection/condition  Outcome: Progressing     Problem: SAFETY ADULT  Goal: Patient will remain free of falls  Description  INTERVENTIONS:  - Assess patient frequently for physical needs  -  Identify cognitive and physical deficits and behaviors that affect risk of falls    -  [place] fall precautions as indicated by assessment   - Educate patient/family on patient safety including physical limitations  - Instruct patient to call for assistance with activity based on assessment  - Modify environment to reduce risk of injury  - Consider OT/PT consult to assist with strengthening/mobility  Outcome: Progressing     Problem: RESPIRATORY - ADULT  Goal: Achieves optimal ventilation and oxygenation  Description  INTERVENTIONS:  - Assess for changes in respiratory status  - Assess for changes in mentation and behavior  - Position to facilitate oxygenation and minimize respiratory effort  - Oxygen administered by appropriate delivery if ordered  - Initiate smoking cessation education as indicated  - Encourage broncho-pulmonary hygiene including cough, deep breathe, Incentive Spirometry  - Assess the need for suctioning and aspirate as needed  - Assess and instruct to report SOB or any respiratory difficulty  - Respiratory Therapy support as indicated  Outcome: Progressing     Problem: SKIN/TISSUE INTEGRITY - ADULT  Goal: Skin integrity remains intact  Description  INTERVENTIONS  - Identify patients at risk for skin breakdown  - Assess and monitor skin integrity  - Assess and monitor nutrition and hydration status  - Monitor labs (i e  albumin)  - Assess for incontinence   - Turn and reposition patient  - Assist with mobility/ambulation  - Relieve pressure over bony prominences  - Avoid friction and shearing  - Provide appropriate hygiene as needed including keeping skin clean and dry  - Evaluate need for skin moisturizer/barrier cream  - Collaborate with interdisciplinary team (i e  Nutrition, Rehabilitation, etc )   - Patient/family teaching  Outcome: Progressing     Problem: HEMATOLOGIC - ADULT  Goal: Maintains hematologic stability  Description  INTERVENTIONS  - Assess for signs and symptoms of bleeding or hemorrhage  - Monitor labs  - Administer supportive blood products/factors as ordered and appropriate  Outcome: Progressing     Problem: METABOLIC, FLUID AND ELECTROLYTES - ADULT  Goal: Electrolytes maintained within normal limits  Description  INTERVENTIONS:  - Monitor labs and assess patient for signs and symptoms of electrolyte imbalances  - Administer electrolyte replacement as ordered  - Monitor response to electrolyte replacements, including repeat lab results as appropriate  - Instruct patient on fluid and nutrition as appropriate  Outcome: Progressing  Goal: Fluid balance maintained  Description  INTERVENTIONS:  - Monitor labs   - Monitor I/O and WT  - Instruct patient on fluid and nutrition as appropriate  - Assess for signs & symptoms of volume excess or deficit  Outcome: Progressing  Goal: Glucose maintained within target range  Description  INTERVENTIONS:  - Monitor Blood Glucose as ordered  - Assess for signs and symptoms of hyperglycemia and hypoglycemia  - Administer ordered medications to maintain glucose within target range  - Assess nutritional intake and initiate nutrition service referral as needed  Outcome: Progressing     Problem: Prexisting or High Potential for Compromised Skin Integrity  Goal: Skin integrity is maintained or improved  Description  INTERVENTIONS:  - Identify patients at risk for skin breakdown  - Assess and monitor skin integrity  - Assess and monitor nutrition and hydration status  - Monitor labs   - Assess for incontinence   - Turn and reposition patient  - Assist with mobility/ambulation  - Relieve pressure over bony prominences  - Avoid friction and shearing  - Provide appropriate hygiene as needed including keeping skin clean and dry  - Evaluate need for skin moisturizer/barrier cream  - Collaborate with interdisciplinary team   - Patient/family teaching  - Consider wound care consult Outcome: Progressing     Problem: DISCHARGE PLANNING  Goal: Discharge to home or other facility with appropriate resources  Description  INTERVENTIONS:  - Identify barriers to discharge w/patient and caregiver  - Arrange for needed discharge resources and transportation as appropriate  - Identify discharge learning needs (meds, wound care, etc )  - Arrange for interpretive services to assist at discharge as needed  - Refer to Case Management Department for coordinating discharge planning if the patient needs post-hospital services based on physician/advanced practitioner order or complex needs related to functional status, cognitive ability, or social support system  Outcome: Progressing     Problem: CARDIOVASCULAR - ADULT  Goal: Maintains optimal cardiac output and hemodynamic stability  Description  INTERVENTIONS:  - Monitor I/O, vital signs and rhythm  - Monitor for S/S and trends of decreased cardiac output  - Administer and titrate ordered vasoactive medications to optimize hemodynamic stability  - Assess quality of pulses, skin color and temperature  - Assess for signs of decreased coronary artery perfusion  - Instruct patient to report change in severity of symptoms  Outcome: Progressing

## 2020-02-08 NOTE — PROGRESS NOTES
NEPHROLOGY PROGRESS NOTE   Anai Severino 49 y o  female MRN: [id number]  Unit/Bed#: [facility] 519-01 Encounter: [id number]  Reason for Consult: Eduard/CKD    ASSESSMENT AND PLAN:  Initial EDUARD suspected to be multifactorial, likely prerenal in the setting of hypotension, blood loss anemia  -with inotropic support, blood transfusion, creatinine now significantly improving from peak level 2 4 to 0 6 today, and stable  EDUARD resolved  -urine output good  -avoid nephrotoxins or NSAIDs  -avoid hypotension  -urinalysis bland without hematuria or proteinuria on 1/31/20  -CT scan without contrast shows no hydronephrosis  -status post IV albumin      Hyponatremia  -suspected to be in the setting of malignancy/SIADH  -workup includes urine sodium 81, urine osmolality 367, serum osmolality 285  -status post tolvaptan one dose with improvement in serum sodium, sodium level overall fluctuating, sodium level improved to 135  -fluid restriction 1 5 L per day  -continue salt tablet to 2 g p o  B i d , continue Lasix 10 mg daily  -will give 20 mEq potassium chloride once today      Mild hypomagnesemia, serum magnesium 1 5 , status post replacement   Repeat serum magnesium in a m      Metastatic breast cancer bone involvement  -as per palliative care note, patient is considering hospice care once she moves to [place]      Hypotension, status post pressors now remains off   Blood pressure has overall improved  -status post IV albumin, now remains on midodrine   -blood culture negative so far  -echo shows EF 88%, grade 1 diastolic dysfunction, no pericardial effusion      Discussed above plan in detail with primary team   Patient will be hospice in [place], currently remains full code  SUBJECTIVE:  Patient seen and examined at bedside   No chest pain, shortness of breath, nausea, vomiting, abdominal pain    OBJECTIVE:  Current Weight: Weight - Scale: 72 7 kg (160 lb 4 4 oz)  Vitals:    02/08/20 0700   BP: 116/55   Pulse: 80   Resp: 18 Temp: 98 5 °F (36 9 °C)   SpO2: 93%       Intake/Output Summary (Last 24 hours) at 2/8/2020 0851  Last data filed at 2/8/2020 6726  Gross per 24 hour   Intake 420 ml   Output 1350 ml   Net -930 ml     Wt Readings from Last 3 Encounters:   02/07/20 72 7 kg (160 lb 4 4 oz)     Temp Readings from Last 3 Encounters:   02/08/20 98 5 °F (36 9 °C) (Oral)     BP Readings from Last 3 Encounters:   02/08/20 116/55     Pulse Readings from Last 3 Encounters:   02/08/20 80        Physical Examination:  General:  Lying in bed, no acute distress   Eyes:  Mild conjunctival pallor present  ENT:  External examination of ears and nose unremarkable  Neck:  No obvious lymphadenopathy appreciated  Respiratory:  Bilateral air entry present  CVS:  S1, S2 present  GI:  Soft, nontender, nondistended  CNS:  Active alert oriented x3  Extremities:  No significant edema in legs  Skin:  No new rash in legs    Medications:    Current Facility-Administered Medications:     aspirin (ECOTRIN LOW STRENGTH) EC tablet 81 mg, 81 mg, Oral, Daily, Armond Lala PA-C, 81 mg at 02/07/20 0801    diphenhydrAMINE (BENADRYL) tablet 25 mg, 25 mg, Oral, Q6H PRN, Cecelia Salcedo PA-C, 25 mg at 02/08/20 0113    fentaNYL (DURAGESIC) 12 mcg/hr TD 72 hr patch 12 mcg, 12 mcg, Transdermal, Q72H, Shira Lala PA-C, 12 mcg at 02/07/20 1220    fentanyl citrate (PF) 100 MCG/2ML 25 mcg, 25 mcg, Intravenous, Q2H PRN, Armond Lala PA-C, 25 mcg at 02/06/20 1338    furosemide (LASIX) tablet 10 mg, 10 mg, Oral, Daily, Jorge Ortega MD, 10 mg at 02/07/20 1458    gabapentin (NEURONTIN) capsule 100 mg, 100 mg, Oral, HS, Shira JONO Lala, PA-C, 100 mg at 02/07/20 2135    heparin (porcine) subcutaneous injection 5,000 Units, 5,000 Units, Subcutaneous, Q8H [address], Armond Lala PA-C, 5,000 Units at 02/08/20 0509    insulin lispro (HumaLOG) 100 units/mL subcutaneous injection 1-5 Units, 1-5 Units, Subcutaneous, 4x Daily (AC & HS), 2 Units at 02/07/20 2139 **AND** Fingerstick Glucose (POCT), , , 4x Daily AC and at bedtime, Ky Lala PA-C    ivabradine HCl (CORLANOR) tablet 5 mg, 5 mg, Oral, BID With Meals, Karrie Lennon, PA-C, 5 mg at 02/07/20 1709    levothyroxine tablet 88 mcg, 88 mcg, Oral, Early Morning, Shira Lala, PA-C, 88 mcg at 02/08/20 0509    lidocaine (LIDODERM) 5 % patch 1 patch, 1 patch, Topical, Daily, yK Lala, PA-C, 1 patch at 02/07/20 1211    lidocaine (LIDODERM) 5 % patch 1 patch, 1 patch, Topical, Daily, Ky Lala, PA-C, 1 patch at 02/07/20 1221    mexiletine (MEXITIL) capsule 150 mg, 150 mg, Oral, TID, Ky Lala, PA-C, 150 mg at 02/07/20 2135    midodrine (PROAMATINE) tablet 10 mg, 10 mg, Oral, TID AC, Shira JONO Garciamuson, PA-C, 10 mg at 02/07/20 1711    montelukast (SINGULAIR) tablet 10 mg, 10 mg, Oral, HS, Shira R Rasmuson, PA-C, 10 mg at 02/07/20 2135    ondansetron (ZOFRAN) injection 4 mg, 4 mg, Intravenous, Q4H PRN, Ky Lala, PA-C, 4 mg at 02/06/20 1840    oxyCODONE (ROXICODONE) IR tablet 5 mg, 5 mg, Oral, Q4H PRN, Ky Lala, PA-C, 5 mg at 02/07/20 1715    pantoprazole (PROTONIX) EC tablet 40 mg, 40 mg, Oral, Early Morning, Shiar R Rasmuson, PA-C, 40 mg at 02/08/20 0509    polyethylene glycol (MIRALAX) packet 17 g, 17 g, Oral, Daily, Shira R Rasmuson, PA-C, 17 g at 02/07/20 0800    sodium chloride tablet 2 g, 2 g, Oral, BID With Meals, Nicole Disla MD, 2 g at 02/07/20 1708    warfarin (COUMADIN) tablet 2 5 mg, 2 5 mg, Oral, Daily (warfarin), Kash Cordoba MD, 2 5 mg at 02/07/20 1712    Laboratory Results:  Results from last 7 days   Lab Units 02/08/20  0509 02/07/20  0531 02/06/20  0605 02/05/20  0612 02/04/20  0543 02/03/20  0538 02/02/20  1122 02/02/20  0453   WBC Thousand/uL  --  8 03 7 48 8 93 11 05* 15 72*  --  18 50*   HEMOGLOBIN g/dL  --  7 5* 7 1* 7 3* 7 4* 7 8* 7 9* 6 8*   HEMATOCRIT %  --  24 1* 22 6* 23 1* 22 4* 23 6*  --  20 3* PLATELETS Thousands/uL  --  166 153 165 181 205  --  212   SODIUM mmol/L 135* 132* 133* 135* 133* 134*  --  130*   POTASSIUM mmol/L 3 6 4 1 3 8 4 1 3 7 3 9  --  4 7   CHLORIDE mmol/L 101 100 101 102 101 103  --  100   CO2 mmol/L 28 29 29 28 27 23  --  22   BUN mg/dL 11 13 11 13 13 23  --  47*   CREATININE mg/dL 0 62 0 77 0 64 0 70 0 78 1 04  --  1 71*   CALCIUM mg/dL 9 7 9 5 9 6 9 9 9 9 9 6  --  9 1   MAGNESIUM mg/dL  --   --   --  1 5* 1 6 1 6  --   --    PHOSPHORUS mg/dL  --   --   --  3 6  --  2 2*  --   --        XR chest portable   Final Result by Ramos Chowdary MD (02/02 1220)      No acute cardiopulmonary disease  Right IJ central line has been placed with tip at the cavoatrial junction  No pneumothorax  Workstation performed: [id number]         CT abdomen pelvis wo contrast   Final Result by Paulie Louise MD (01/31 1014)      Redemonstration of diffuse metastatic hepatic disease  Diffuse osseous metastatic disease  No pathologic fractures are identified  Cholelithiasis without CT evidence for acute cholecystitis  Workstation performed: [id number]         US right upper quadrant   Final Result by Ramos Chowdary MD (01/30 1549)      Moderate hepatomegaly, with diffusely heterogeneous nodular liver parenchyma in keeping with history of widespread metastatic disease  There is no evidence of biliary ductal dilatation  There is sludge and small gallstone within the gallbladder, without evidence of acute cholecystitis  Workstation performed: [id number]         XR chest 1 view portable   ED Interpretation by Oscar Raya DO (01/29 1316)   No acute cardiopulmonary disease      Final Result by Mckinley Kasper MD (01/29 1353)   Typical left-sided ICD      No acute cardiopulmonary disease            Findings are consistent with emergency provider's preliminary reading                     Workstation performed: [id number]             Portions of the record may have been created with voice recognition software  Occasional wrong word or "sound a like" substitutions may have occurred due to the inherent limitations of voice recognition software  Read the chart carefully and recognize, using context, where substitutions have occurred

## 2020-02-09 LAB
ABO GROUP BLD: NORMAL
ANION GAP SERPL CALCULATED.3IONS-SCNC: 5 MMOL/L (ref 4–13)
BLD GP AB SCN SERPL QL: NEGATIVE
BUN SERPL-MCNC: 11 MG/DL (ref 5–25)
CALCIUM SERPL-MCNC: 9.3 MG/DL (ref 8.3–10.1)
CHLORIDE SERPL-SCNC: 101 MMOL/L (ref 100–108)
CO2 SERPL-SCNC: 28 MMOL/L (ref 21–32)
CREAT SERPL-MCNC: 0.68 MG/DL (ref 0.6–1.3)
ERYTHROCYTE [DISTWIDTH] IN BLOOD BY AUTOMATED COUNT: 19.5 % (ref 11.6–15.1)
GFR SERPL CREATININE-BSD FRML MDRD: 100 ML/MIN/1.73SQ M
GLUCOSE SERPL-MCNC: 110 MG/DL (ref 65–140)
GLUCOSE SERPL-MCNC: 116 MG/DL (ref 65–140)
GLUCOSE SERPL-MCNC: 129 MG/DL (ref 65–140)
GLUCOSE SERPL-MCNC: 156 MG/DL (ref 65–140)
GLUCOSE SERPL-MCNC: 96 MG/DL (ref 65–140)
HCT VFR BLD AUTO: 21.8 % (ref 34.8–46.1)
HGB BLD-MCNC: 6.9 G/DL (ref 11.5–15.4)
INR PPP: 1.61 (ref 0.84–1.19)
MCH RBC QN AUTO: 32.2 PG (ref 26.8–34.3)
MCHC RBC AUTO-ENTMCNC: 31.7 G/DL (ref 31.4–37.4)
MCV RBC AUTO: 102 FL (ref 82–98)
PLATELET # BLD AUTO: 149 THOUSANDS/UL (ref 149–390)
PMV BLD AUTO: 11.7 FL (ref 8.9–12.7)
POTASSIUM SERPL-SCNC: 3.8 MMOL/L (ref 3.5–5.3)
PROTHROMBIN TIME: 18.7 SECONDS (ref 11.6–14.5)
RBC # BLD AUTO: 2.14 MILLION/UL (ref 3.81–5.12)
RH BLD: POSITIVE
SODIUM SERPL-SCNC: 134 MMOL/L (ref 136–145)
SPECIMEN EXPIRATION DATE: NORMAL
WBC # BLD AUTO: 6.33 THOUSAND/UL (ref 4.31–10.16)

## 2020-02-09 PROCEDURE — 86923 COMPATIBILITY TEST ELECTRIC: CPT

## 2020-02-09 PROCEDURE — 82948 REAGENT STRIP/BLOOD GLUCOSE: CPT

## 2020-02-09 PROCEDURE — 86850 RBC ANTIBODY SCREEN: CPT | Performed by: PHYSICIAN ASSISTANT

## 2020-02-09 PROCEDURE — 99232 SBSQ HOSP IP/OBS MODERATE 35: CPT | Performed by: INTERNAL MEDICINE

## 2020-02-09 PROCEDURE — 86901 BLOOD TYPING SEROLOGIC RH(D): CPT | Performed by: PHYSICIAN ASSISTANT

## 2020-02-09 PROCEDURE — 85027 COMPLETE CBC AUTOMATED: CPT | Performed by: INTERNAL MEDICINE

## 2020-02-09 PROCEDURE — 85610 PROTHROMBIN TIME: CPT | Performed by: INTERNAL MEDICINE

## 2020-02-09 PROCEDURE — 86900 BLOOD TYPING SEROLOGIC ABO: CPT | Performed by: PHYSICIAN ASSISTANT

## 2020-02-09 PROCEDURE — 80048 BASIC METABOLIC PNL TOTAL CA: CPT | Performed by: INTERNAL MEDICINE

## 2020-02-09 PROCEDURE — 99233 SBSQ HOSP IP/OBS HIGH 50: CPT | Performed by: INTERNAL MEDICINE

## 2020-02-09 RX ORDER — OXYCODONE HYDROCHLORIDE 10 MG/1
10 TABLET ORAL EVERY 4 HOURS PRN
Status: DISCONTINUED | OUTPATIENT
Start: 2020-02-09 | End: 2020-02-11 | Stop reason: HOSPADM

## 2020-02-09 RX ORDER — FENTANYL CITRATE 50 UG/ML
50 INJECTION, SOLUTION INTRAMUSCULAR; INTRAVENOUS EVERY 2 HOUR PRN
Status: DISCONTINUED | OUTPATIENT
Start: 2020-02-09 | End: 2020-02-11 | Stop reason: HOSPADM

## 2020-02-09 RX ORDER — WARFARIN SODIUM 1 MG/1
4 TABLET ORAL
Status: DISCONTINUED | OUTPATIENT
Start: 2020-02-09 | End: 2020-02-11 | Stop reason: HOSPADM

## 2020-02-09 RX ADMIN — FUROSEMIDE 10 MG: 20 TABLET ORAL at 08:25

## 2020-02-09 RX ADMIN — LIDOCAINE 1 PATCH: 50 PATCH TOPICAL at 08:29

## 2020-02-09 RX ADMIN — LIDOCAINE 1 PATCH: 50 PATCH TOPICAL at 08:26

## 2020-02-09 RX ADMIN — DIPHENHYDRAMINE HCL 25 MG: 25 TABLET, COATED ORAL at 06:05

## 2020-02-09 RX ADMIN — WARFARIN SODIUM 4 MG: 1 TABLET ORAL at 17:52

## 2020-02-09 RX ADMIN — BISACODYL 10 MG: 5 TABLET, COATED ORAL at 12:11

## 2020-02-09 RX ADMIN — POLYETHYLENE GLYCOL 3350 17 G: 17 POWDER, FOR SOLUTION ORAL at 12:04

## 2020-02-09 RX ADMIN — IVABRADINE 5 MG: 5 TABLET, FILM COATED ORAL at 08:22

## 2020-02-09 RX ADMIN — Medication 2 G: at 08:25

## 2020-02-09 RX ADMIN — ASPIRIN 81 MG: 81 TABLET ORAL at 08:25

## 2020-02-09 RX ADMIN — HEPARIN SODIUM 5000 UNITS: 5000 INJECTION INTRAVENOUS; SUBCUTANEOUS at 06:04

## 2020-02-09 RX ADMIN — PANTOPRAZOLE SODIUM 40 MG: 40 TABLET, DELAYED RELEASE ORAL at 06:05

## 2020-02-09 RX ADMIN — MIDODRINE HYDROCHLORIDE 10 MG: 5 TABLET ORAL at 12:03

## 2020-02-09 RX ADMIN — MEXILETINE HYDROCHLORIDE 150 MG: 150 CAPSULE ORAL at 15:59

## 2020-02-09 RX ADMIN — GABAPENTIN 100 MG: 100 CAPSULE ORAL at 22:04

## 2020-02-09 RX ADMIN — IVABRADINE 5 MG: 5 TABLET, FILM COATED ORAL at 15:59

## 2020-02-09 RX ADMIN — FENTANYL CITRATE 25 MCG: 50 INJECTION, SOLUTION INTRAMUSCULAR; INTRAVENOUS at 00:10

## 2020-02-09 RX ADMIN — MEXILETINE HYDROCHLORIDE 150 MG: 150 CAPSULE ORAL at 22:04

## 2020-02-09 RX ADMIN — MEXILETINE HYDROCHLORIDE 150 MG: 150 CAPSULE ORAL at 12:03

## 2020-02-09 RX ADMIN — HEPARIN SODIUM 5000 UNITS: 5000 INJECTION INTRAVENOUS; SUBCUTANEOUS at 22:04

## 2020-02-09 RX ADMIN — Medication 2 G: at 16:01

## 2020-02-09 RX ADMIN — LEVOTHYROXINE SODIUM 88 MCG: 88 TABLET ORAL at 06:05

## 2020-02-09 RX ADMIN — MIDODRINE HYDROCHLORIDE 10 MG: 5 TABLET ORAL at 06:05

## 2020-02-09 RX ADMIN — MONTELUKAST SODIUM 10 MG: 10 TABLET, FILM COATED ORAL at 22:04

## 2020-02-09 RX ADMIN — MIDODRINE HYDROCHLORIDE 10 MG: 5 TABLET ORAL at 16:00

## 2020-02-09 RX ADMIN — HEPARIN SODIUM 5000 UNITS: 5000 INJECTION INTRAVENOUS; SUBCUTANEOUS at 13:54

## 2020-02-09 RX ADMIN — INSULIN LISPRO 1 UNITS: 100 INJECTION, SOLUTION INTRAVENOUS; SUBCUTANEOUS at 12:02

## 2020-02-09 NOTE — PROGRESS NOTES
NEPHROLOGY PROGRESS NOTE   Love Montelongo 49 y o  female MRN: [id number]  Unit/Bed#: [facility] 519-01 Encounter: [id number]  Reason for Consult: Hyponatremia    ASSESSMENT AND PLAN:  [place] suspected to be multifactorial, likely prerenal in the setting of hypotension, blood loss anemia  -with inotropic support, blood transfusion, creatinine now significantly improving from peak level 2 4 to 0 6 today, and stable   SACHI resolved  -urine output good  -avoid nephrotoxins or NSAIDs  -avoid hypotension  -urinalysis bland without hematuria or proteinuria on 1/31/20  -CT scan without contrast shows no hydronephrosis  -status post IV albumin      Hyponatremia  -suspected to be in the setting of malignancy/SIADH  -workup includes urine sodium 81, urine osmolality 367, serum osmolality 285  -status post tolvaptan one dose with improvement in serum sodium, sodium level overall fluctuating and seems to be stable 134 today  -fluid restriction 1 5 L per day  -continue salt tablet to 2 g p o  B i d , continue Lasix 10 mg daily     Mild hypomagnesemia, serum magnesium 1 5 , status post replacement   Repeat serum magnesium in a m      Metastatic breast cancer bone involvement  -as per palliative care note, patient is considering hospice care once she moves to [place]  Severe anemia, hemoglobin dropped significantly to 6 9 today  Consider blood transfusion 1 unit, will defer to primary team   -no obvious active bleeding noted  Further evaluation per primary team      Hypotension, status post pressors now remains off   Blood pressure has overall improved  -status post IV albumin, now remains on midodrine   -blood culture negative so far  -echo shows EF 22%, grade 1 diastolic dysfunction, no pericardial effusion      Discussed above plan in detail with primary team   Patient will be hospice in [place], currently remains full code  SUBJECTIVE:  Patient seen and examined at bedside   No chest pain, shortness of breath, nausea, vomiting, abdominal pain    OBJECTIVE:  Current Weight: Weight - Scale: 72 6 kg (160 lb 0 9 oz)  Vitals:    02/09/20 0706   BP: 118/59   Pulse: 80   Resp: 18   Temp: 98 7 °F (37 1 °C)   SpO2: 98%       Intake/Output Summary (Last 24 hours) at 2/9/2020 0908  Last data filed at 2/9/2020 0846  Gross per 24 hour   Intake 600 ml   Output 300 ml   Net 300 ml     Wt Readings from Last 3 Encounters:   02/09/20 72 6 kg (160 lb 0 9 oz)     Temp Readings from Last 3 Encounters:   02/09/20 98 7 °F (37 1 °C) (Oral)     BP Readings from Last 3 Encounters:   02/09/20 118/59     Pulse Readings from Last 3 Encounters:   02/09/20 80        Physical Examination:  General:  Lying in bed, no acute distress   Eyes:  Mild conjunctival pallor present  ENT:  External examination of ears and nose unremarkable  Neck:  No obvious lymphadenopathy appreciated  Respiratory:  Bilateral air entry present  CVS:  S1, S2 present  GI:  Soft, nontender, nondistended  CNS:  Active alert oriented x3  Extremities:  No significant edema in legs  Skin:  No new rash in legs    Medications:    Current Facility-Administered Medications:     aspirin (ECOTRIN LOW STRENGTH) EC tablet 81 mg, 81 mg, Oral, Daily, Shahbaz Lala PA-C, 81 mg at 02/09/20 0825    bisacodyl (DULCOLAX) EC tablet 10 mg, 10 mg, Oral, Once, Peggy Queen MD    diphenhydrAMINE (BENADRYL) tablet 25 mg, 25 mg, Oral, Q6H PRN, Robe Castaneda PA-C, 25 mg at 02/09/20 0605    fentaNYL (DURAGESIC) 12 mcg/hr TD 72 hr patch 12 mcg, 12 mcg, Transdermal, Q72H, Shiralorie Lala PA-C, 12 mcg at 02/07/20 1220    fentanyl citrate (PF) 100 MCG/2ML 25 mcg, 25 mcg, Intravenous, Q2H PRN, Shahbaz Lala PA-C, 25 mcg at 02/09/20 0010    furosemide (LASIX) tablet 10 mg, 10 mg, Oral, Daily, Jorge Ortega MD, 10 mg at 02/09/20 0825    gabapentin (NEURONTIN) capsule 100 mg, 100 mg, Oral, HS, Shahbaz Lala PA-C, 100 mg at 02/08/20 2122    heparin (porcine) subcutaneous injection 5,000 Units, 5,000 Units, Subcutaneous, Q8H [address], Lonamarlen Vailente Rasmuson, PA-C, 5,000 Units at 02/09/20 0604    insulin lispro (HumaLOG) 100 units/mL subcutaneous injection 1-5 Units, 1-5 Units, Subcutaneous, 4x Daily (AC & HS), 1 Units at 02/08/20 1330 **AND** Fingerstick Glucose (POCT), , , 4x Daily AC and at bedtime, Lona Lala, PA-C    ivabradine HCl (CORLANOR) tablet 5 mg, 5 mg, Oral, BID With Meals, Javier Hall, PA-C, 5 mg at 02/09/20 8815    levothyroxine tablet 88 mcg, 88 mcg, Oral, Early Morning, Lona Lala, PA-C, 88 mcg at 02/09/20 0605    lidocaine (LIDODERM) 5 % patch 1 patch, 1 patch, Topical, Daily, Lona Garciamuson, PA-C, 1 patch at 02/09/20 0829    lidocaine (LIDODERM) 5 % patch 1 patch, 1 patch, Topical, Daily, Lonamarlen Valiente Rasmuson, PA-C, 1 patch at 02/09/20 0826    mexiletine (MEXITIL) capsule 150 mg, 150 mg, Oral, TID, Lona Valiente Rasmuson, PA-C, 150 mg at 02/08/20 2122    midodrine (PROAMATINE) tablet 10 mg, 10 mg, Oral, TID AC, Shira R Sepidehon, PA-C, 10 mg at 02/09/20 0605    montelukast (SINGULAIR) tablet 10 mg, 10 mg, Oral, HS, Lonamarlen Valiente Rasmuson, PA-C, 10 mg at 02/08/20 2123    ondansetron (ZOFRAN) injection 4 mg, 4 mg, Intravenous, Q4H PRN, Lona Garciamuson, PA-C, 4 mg at 02/08/20 2123    oxyCODONE (ROXICODONE) IR tablet 5 mg, 5 mg, Oral, Q4H PRN, Lona Garciamuson, PA-C, 5 mg at 02/08/20 2122    pantoprazole (PROTONIX) EC tablet 40 mg, 40 mg, Oral, Early Morning, Lonadora Lala PA-C, 40 mg at 02/09/20 0605    polyethylene glycol (MIRALAX) packet 17 g, 17 g, Oral, Daily, Shira Lala PA-C, 17 g at 02/07/20 0800    sodium chloride tablet 2 g, 2 g, Oral, BID With Meals, Luci Quiroz MD, 2 g at 02/09/20 0825    warfarin (COUMADIN) tablet 4 mg, 4 mg, Oral, Daily (warfarin), Damian Hill MD    Laboratory Results:  Results from last 7 days   Lab Units 02/09/20  0450 02/08/20  5296 02/07/20  8924 02/06/20  7179 02/05/20  8831 02/04/20  0543 02/03/20  0538 02/02/20  1122   WBC Thousand/uL 6 33  --  8 03 7 48 8 93 11 05* 15 72*  --    HEMOGLOBIN g/dL 6 9*  --  7 5* 7 1* 7 3* 7 4* 7 8* 7 9*   HEMATOCRIT % 21 8*  --  24 1* 22 6* 23 1* 22 4* 23 6*  --    PLATELETS Thousands/uL 149  --  166 153 165 181 205  --    SODIUM mmol/L 134* 135* 132* 133* 135* 133* 134*  --    POTASSIUM mmol/L 3 8 3 6 4 1 3 8 4 1 3 7 3 9  --    CHLORIDE mmol/L 101 101 100 101 102 101 103  --    CO2 mmol/L 28 28 29 29 28 27 23  --    BUN mg/dL 11 11 13 11 13 13 23  --    CREATININE mg/dL 0 68 0 62 0 77 0 64 0 70 0 78 1 04  --    CALCIUM mg/dL 9 3 9 7 9 5 9 6 9 9 9 9 9 6  --    MAGNESIUM mg/dL  --   --   --   --  1 5* 1 6 1 6  --    PHOSPHORUS mg/dL  --   --   --   --  3 6  --  2 2*  --        XR chest portable   Final Result by Genevieve Richard MD (02/02 1220)      No acute cardiopulmonary disease  Right IJ central line has been placed with tip at the cavoatrial junction  No pneumothorax  Workstation performed: [id number]         CT abdomen pelvis wo contrast   Final Result by John Mcleod MD (01/31 1014)      Redemonstration of diffuse metastatic hepatic disease  Diffuse osseous metastatic disease  No pathologic fractures are identified  Cholelithiasis without CT evidence for acute cholecystitis  Workstation performed: [id number]         US right upper quadrant   Final Result by Genevieve Richard MD (01/30 2290)      Moderate hepatomegaly, with diffusely heterogeneous nodular liver parenchyma in keeping with history of widespread metastatic disease  There is no evidence of biliary ductal dilatation  There is sludge and small gallstone within the gallbladder, without evidence of acute cholecystitis        Workstation performed: [id number]         XR chest 1 view portable   ED Interpretation by Roberto Castro DO (01/29 1316)   No acute cardiopulmonary disease      Final Result by Shola Thibodeaux MD (01/29 1353)   Typical left-sided ICD      No acute cardiopulmonary disease  Findings are consistent with emergency provider's preliminary reading                     Workstation performed: [id number]             Portions of the record may have been created with voice recognition software  Occasional wrong word or "sound a like" substitutions may have occurred due to the inherent limitations of voice recognition software  Read the chart carefully and recognize, using context, where substitutions have occurred

## 2020-02-09 NOTE — PLAN OF CARE
Problem: Potential for Falls  Goal: Patient will remain free of falls  Description  INTERVENTIONS:  - Assess patient frequently for physical needs  -  Identify cognitive and physical deficits and behaviors that affect risk of falls    -  [place] fall precautions as indicated by assessment   - Educate patient/family on patient safety including physical limitations  - Instruct patient to call for assistance with activity based on assessment  - Modify environment to reduce risk of injury  - Consider OT/PT consult to assist with strengthening/mobility  Outcome: Progressing     Problem: PAIN - ADULT  Goal: Verbalizes/displays adequate comfort level or baseline comfort level  Description  Interventions:  - Encourage patient to monitor pain and request assistance  - Assess pain using appropriate pain scale  - Administer analgesics based on type and severity of pain and evaluate response  - Implement non-pharmacological measures as appropriate and evaluate response  - Consider cultural and social influences on pain and pain management  - Notify physician/advanced practitioner if interventions unsuccessful or patient reports new pain  Outcome: Progressing     Problem: INFECTION - ADULT  Goal: Absence or prevention of progression during hospitalization  Description  INTERVENTIONS:  - Assess and monitor for signs and symptoms of infection  - Monitor lab/diagnostic results  - Monitor all insertion sites, i e  indwelling lines, tubes, and drains  - Monitor endotracheal if appropriate and nasal secretions for changes in amount and color  - [place] appropriate cooling/warming therapies per order  - Administer medications as ordered  - Instruct and encourage patient and family to use good hand hygiene technique  - Identify and instruct in appropriate isolation precautions for identified infection/condition  Outcome: Progressing     Problem: SAFETY ADULT  Goal: Patient will remain free of falls  Description  INTERVENTIONS:  - Assess patient frequently for physical needs  -  Identify cognitive and physical deficits and behaviors that affect risk of falls    -  [place] fall precautions as indicated by assessment   - Educate patient/family on patient safety including physical limitations  - Instruct patient to call for assistance with activity based on assessment  - Modify environment to reduce risk of injury  - Consider OT/PT consult to assist with strengthening/mobility  Outcome: Progressing     Problem: RESPIRATORY - ADULT  Goal: Achieves optimal ventilation and oxygenation  Description  INTERVENTIONS:  - Assess for changes in respiratory status  - Assess for changes in mentation and behavior  - Position to facilitate oxygenation and minimize respiratory effort  - Oxygen administered by appropriate delivery if ordered  - Initiate smoking cessation education as indicated  - Encourage broncho-pulmonary hygiene including cough, deep breathe, Incentive Spirometry  - Assess the need for suctioning and aspirate as needed  - Assess and instruct to report SOB or any respiratory difficulty  - Respiratory Therapy support as indicated  Outcome: Progressing     Problem: SKIN/TISSUE INTEGRITY - ADULT  Goal: Skin integrity remains intact  Description  INTERVENTIONS  - Identify patients at risk for skin breakdown  - Assess and monitor skin integrity  - Assess and monitor nutrition and hydration status  - Monitor labs (i e  albumin)  - Assess for incontinence   - Turn and reposition patient  - Assist with mobility/ambulation  - Relieve pressure over bony prominences  - Avoid friction and shearing  - Provide appropriate hygiene as needed including keeping skin clean and dry  - Evaluate need for skin moisturizer/barrier cream  - Collaborate with interdisciplinary team (i e  Nutrition, Rehabilitation, etc )   - Patient/family teaching  Outcome: Progressing     Problem: HEMATOLOGIC - ADULT  Goal: Maintains hematologic stability  Description  INTERVENTIONS  - Assess for signs and symptoms of bleeding or hemorrhage  - Monitor labs  - Administer supportive blood products/factors as ordered and appropriate  Outcome: Progressing     Problem: METABOLIC, FLUID AND ELECTROLYTES - ADULT  Goal: Electrolytes maintained within normal limits  Description  INTERVENTIONS:  - Monitor labs and assess patient for signs and symptoms of electrolyte imbalances  - Administer electrolyte replacement as ordered  - Monitor response to electrolyte replacements, including repeat lab results as appropriate  - Instruct patient on fluid and nutrition as appropriate  Outcome: Progressing  Goal: Fluid balance maintained  Description  INTERVENTIONS:  - Monitor labs   - Monitor I/O and WT  - Instruct patient on fluid and nutrition as appropriate  - Assess for signs & symptoms of volume excess or deficit  Outcome: Progressing  Goal: Glucose maintained within target range  Description  INTERVENTIONS:  - Monitor Blood Glucose as ordered  - Assess for signs and symptoms of hyperglycemia and hypoglycemia  - Administer ordered medications to maintain glucose within target range  - Assess nutritional intake and initiate nutrition service referral as needed  Outcome: Progressing     Problem: Prexisting or High Potential for Compromised Skin Integrity  Goal: Skin integrity is maintained or improved  Description  INTERVENTIONS:  - Identify patients at risk for skin breakdown  - Assess and monitor skin integrity  - Assess and monitor nutrition and hydration status  - Monitor labs   - Assess for incontinence   - Turn and reposition patient  - Assist with mobility/ambulation  - Relieve pressure over bony prominences  - Avoid friction and shearing  - Provide appropriate hygiene as needed including keeping skin clean and dry  - Evaluate need for skin moisturizer/barrier cream  - Collaborate with interdisciplinary team   - Patient/family teaching  - Consider wound care consult Outcome: Progressing     Problem: DISCHARGE PLANNING  Goal: Discharge to home or other facility with appropriate resources  Description  INTERVENTIONS:  - Identify barriers to discharge w/patient and caregiver  - Arrange for needed discharge resources and transportation as appropriate  - Identify discharge learning needs (meds, wound care, etc )  - Arrange for interpretive services to assist at discharge as needed  - Refer to Case Management Department for coordinating discharge planning if the patient needs post-hospital services based on physician/advanced practitioner order or complex needs related to functional status, cognitive ability, or social support system  Outcome: Progressing     Problem: CARDIOVASCULAR - ADULT  Goal: Maintains optimal cardiac output and hemodynamic stability  Description  INTERVENTIONS:  - Monitor I/O, vital signs and rhythm  - Monitor for S/S and trends of decreased cardiac output  - Administer and titrate ordered vasoactive medications to optimize hemodynamic stability  - Assess quality of pulses, skin color and temperature  - Assess for signs of decreased coronary artery perfusion  - Instruct patient to report change in severity of symptoms  Outcome: Progressing

## 2020-02-09 NOTE — ASSESSMENT & PLAN NOTE
No results found for: HGBA1C    Recent Labs     02/08/20  1636 02/08/20  2117 02/09/20  0632 02/09/20  1034   POCGLU 120 143* 96 156*       Blood Sugar Average: Last 72 hrs:  (P) 135 [id number]  SSI, accuchecks

## 2020-02-09 NOTE — ASSESSMENT & PLAN NOTE
Wt Readings from Last 3 Encounters:   02/09/20 72 6 kg (160 lb 0 9 oz)     EF 36%  Stable at this time without shortness of breath  Monitor daily weights  Monitor volume status

## 2020-02-09 NOTE — QUICK NOTE
Progress Note - Palliative & Supportive Care     Notified that pain control not adequate  Changes in PRN medications:  Oxycodone 5 mg- moderate pain  Oxycodone 10 mg- severe pain  Fentanyl 50 mcg- breakthrough pain  May need to increase Fentanyl patch  Will follow      Marcos Chaidez DO  Palliative and Supportive Care  [phone]

## 2020-02-09 NOTE — PROGRESS NOTES
Progress Note - Bk Izaguirre 1965, 49 y o  female MRN: [id number]    Unit/Bed#: [facility] 315-90 Encounter: [id number]    Primary Care Provider: Arnol Ivey MD   Date and time admitted to hospital: 1/29/2020 11:33 AM        Anemia  Assessment & Plan  Likely multifactorial including chronic disease/malignancy, multiple blood draws  Hemoglobin decreased today  Transfuse 1 unit packed red cells today  Patient denies any black or blood in stools  Repeat CBC in a m  Hepatorenal syndrome ([facility] [address] )  Assessment & Plan  Improved, continue midodrine  Monitor BMP    Hyponatremia  Assessment & Plan  Sodium slightly worsening day by day  Continue fluid restriction  Continue sodium chloride tablets  Continue Lasix 10 mg daily    Improved    Type 2 diabetes mellitus without complication, without long-term current use of insulin [facility])  Assessment & Plan  No results found for: HGBA1C    Recent Labs     02/08/20  1636 02/08/20  2117 02/09/20  0632 02/09/20  1034   POCGLU 120 143* 96 156*       Blood Sugar Average: Last 72 hrs:  (P) 135 [id number]  SSI, accuchecks    Cardiomyopathy secondary to drug [facility])  Assessment & Plan  Nonischemic cardiomyopathy, previous EF 25%,  now 36%  Secondary to chemotherapy       Chronic systolic heart failure (HCC)  Assessment & Plan  Wt Readings from Last 3 Encounters:   02/09/20 72 6 kg (160 lb 0 9 oz)     EF 36%  Stable at this time without shortness of breath  Monitor daily weights  Monitor volume status    Elevated LFTs  Assessment & Plan  Likely related to worsening metastasis liver disease  Jaundice  Liver ultrasound with widespread metastatic disease  Abdominal CT scan with pneumobilia  Evaluated by GI, no biliary obstruction  Continue supportive care        Metastatic breast cancer [facility])  Assessment & Plan  With metastasis to bone and liver, ongoing low back pain  Evaluated by Oncology, recommendation for follow-up with her oncologist  Poor prognosis  Palliative Care is following  Patient currently like to continue with full code and with above treatment    Chronic atrial fibrillation  Assessment & Plan  Rate controlled  Continue corlanor  Digoxin held  INR subtherapeutic  Increase warfarin to 4 mg    Ventricular tachycardia (HCC)  Assessment & Plan  No arrhythmia on ICD interrogation  Stable on corelanor      * SOB (shortness of breath)  Assessment & Plan  · Patient states that prior shortness of breath has been associated with V-tach in the past, as per EMS she had 5 beat run of V-tach  · Chest x-ray shows no acute cardiopulmonary pathology  · Cardiac echo with EF 36%  · Evaluated by Cardiology, no arrhythmia on ICD interrogation, no sign of CHF  · Resolved          VTE Pharmacologic Prophylaxis:   Pharmacologic: Heparin  Mechanical VTE Prophylaxis in Place: Yes    Patient Centered Rounds: I have performed bedside rounds with nursing staff today  Discussions with Specialists or Other Care Team Provider:  Palliative care    Education and Discussions with Family / Patient:  Patient, plan of care    Time Spent for Care: 20 minutes  More than 50% of total time spent on counseling and coordination of care as described above  Current Length of Stay: 11 day(s)    Current Patient Status: Inpatient   Certification Statement: The patient will continue to require additional inpatient hospital stay due to Discharge Planning    Discharge Plan:  SNF in [facility] likely tomorrow    Code Status: Level 1 - Full Code      Subjective:   Denies any shortness of breath, chest pain  Tolerating diet  Had a small bowel movement but does still complain of constipation  Objective:     Vitals:   Temp (24hrs), Av 6 °F (37 °C), Min:98 3 °F (36 8 °C), Max:98 8 °F (37 1 °C)    Temp:  [98 3 °F (36 8 °C)-98 8 °F (37 1 °C)] 98 6 °F (37 °C)  HR:  [74-81] 75  Resp:  [16-20] 20  BP: (103-121)/(52-64) 114/54  SpO2:  [93 %-98 %] 93 %  Body mass index is 24 34 kg/m²       Input and Output Summary (last 24 hours): Intake/Output Summary (Last 24 hours) at 2/9/2020 1600  Last data filed at 2/9/2020 1230  Gross per 24 hour   Intake 600 ml   Output 600 ml   Net 0 ml       Physical Exam:     Physical Exam   Constitutional: She is oriented to person, place, and time  She appears well-developed and well-nourished  HENT:   Head: Normocephalic and atraumatic  Eyes: Pupils are equal, round, and reactive to light  Neck: Neck supple  Pulmonary/Chest: Effort normal    Abdominal: She exhibits no distension  Neurological: She is alert and oriented to person, place, and time  Skin: Skin is warm and dry  Psychiatric: She has a normal mood and affect  Her behavior is normal      Additional Data:     Labs:    Results from last 7 days   Lab Units 02/09/20  0450 02/03/20  0538   WBC Thousand/uL 6 33   < > 15 72*   HEMOGLOBIN g/dL 6 9*   < > 7 8*   HEMATOCRIT % 21 8*   < > 23 6*   PLATELETS Thousands/uL 149   < > 205   NEUTROS PCT %  --   --  81*   LYMPHS PCT %  --   --  7*   MONOS PCT %  --   --  10   EOS PCT %  --   --  0    < > = values in this interval not displayed  Results from last 7 days   Lab Units 02/09/20  0450  02/06/20  0605   SODIUM mmol/L 134*   < > 133*   POTASSIUM mmol/L 3 8   < > 3 8   CHLORIDE mmol/L 101   < > 101   CO2 mmol/L 28   < > 29   BUN mg/dL 11   < > 11   CREATININE mg/dL 0 68   < > 0 64   ANION GAP mmol/L 5   < > 3*   CALCIUM mg/dL 9 3   < > 9 6   ALBUMIN g/dL  --   --  2 0*   TOTAL BILIRUBIN mg/dL  --   --  5 30*   ALK PHOS U/L  --   --  901*   ALT U/L  --   --  85*   AST U/L  --   --  137*   GLUCOSE RANDOM mg/dL 116   < > 112    < > = values in this interval not displayed       Results from last 7 days   Lab Units 02/09/20  0450   INR  1 61*     Results from last 7 days   Lab Units 02/09/20  1034 02/09/20  6412 02/08/20  2117 02/08/20  1636 02/08/20  1032 02/08/20  0616 02/07/20  2138 02/07/20  1648 02/07/20  1033 02/07/20  0618 02/06/20  2152 02/06/20  1650   POC GLUCOSE mg/dl 156* 96 143* 120 181* 81 215* 126 157* 111 123 132                   * I Have Reviewed All Lab Data Listed Above  * Additional Pertinent Lab Tests Reviewed: All Labs Within Last 24 Hours Reviewed        Recent Cultures (last 7 days):           Last 24 Hours Medication List:     Current Facility-Administered Medications:  aspirin 81 mg Oral Daily Shira R TEODORA Lala   diphenhydrAMINE 25 mg Oral Q6H PRN Wm Herndon PA-C   fentaNYL 12 mcg Transdermal Q72H Shira R TEODORA Lala   fentanyl citrate (PF) 50 mcg Intravenous Q2H PRN Kika Boles, DO   furosemide 10 mg Oral Daily Marisa Acevedo MD   gabapentin 100 mg Oral HS Shira R TEODORA Lala   heparin (porcine) 5,000 Units Subcutaneous Q8H [address] Shira R TEODORA Lala   insulin lispro 1-5 Units Subcutaneous 4x Daily (AC & HS) Lev Lala PA-C   ivabradine HCl 5 mg Oral BID With Meals Tashia James PA-C   levothyroxine 88 mcg Oral Early Morning Lev Lala PA-C   lidocaine 1 patch Topical Daily Lev Lala PA-C   lidocaine 1 patch Topical Daily Tashia James PA-C   mexiletine 150 mg Oral TID Tashia James PA-C   midodrine 10 mg Oral TID AC Shira R TEODORA Lala   montelukast 10 mg Oral HS Shira Lala PA-C   ondansetron 4 mg Intravenous Q4H PRN Tashia James PA-C   oxyCODONE 10 mg Oral Q4H PRN Kika Boles, DO   oxyCODONE 5 mg Oral Q4H PRN Lev Lala PA-C   pantoprazole 40 mg Oral Early Morning Shira R BUCKY Lala-C   polyethylene glycol 17 g Oral Daily Shira R TEODORA Lala   sodium chloride 2 g Oral BID With Meals Marisa Acevedo MD   warfarin 4 mg Oral Daily (warfarin) Bijan Virk MD        Today, Patient Was Seen By: Jaime Tong MD    ** Please Note: Dictation voice to text software may have been used in the creation of this document   **

## 2020-02-10 LAB
ABO GROUP BLD BPU: NORMAL
ANION GAP SERPL CALCULATED.3IONS-SCNC: 8 MMOL/L (ref 4–13)
BPU ID: NORMAL
BUN SERPL-MCNC: 10 MG/DL (ref 5–25)
CALCIUM SERPL-MCNC: 9.7 MG/DL (ref 8.3–10.1)
CHLORIDE SERPL-SCNC: 100 MMOL/L (ref 100–108)
CO2 SERPL-SCNC: 27 MMOL/L (ref 21–32)
CREAT SERPL-MCNC: 0.64 MG/DL (ref 0.6–1.3)
CROSSMATCH: NORMAL
ERYTHROCYTE [DISTWIDTH] IN BLOOD BY AUTOMATED COUNT: 24.3 % (ref 11.6–15.1)
GFR SERPL CREATININE-BSD FRML MDRD: 102 ML/MIN/1.73SQ M
GLUCOSE SERPL-MCNC: 130 MG/DL (ref 65–140)
GLUCOSE SERPL-MCNC: 144 MG/DL (ref 65–140)
GLUCOSE SERPL-MCNC: 174 MG/DL (ref 65–140)
GLUCOSE SERPL-MCNC: 68 MG/DL (ref 65–140)
GLUCOSE SERPL-MCNC: 77 MG/DL (ref 65–140)
HCT VFR BLD AUTO: 24.8 % (ref 34.8–46.1)
HGB BLD-MCNC: 7.6 G/DL (ref 11.5–15.4)
INR PPP: 1.7 (ref 0.84–1.19)
MAGNESIUM SERPL-MCNC: 1.6 MG/DL (ref 1.6–2.6)
MCH RBC QN AUTO: 29.6 PG (ref 26.8–34.3)
MCHC RBC AUTO-ENTMCNC: 30.6 G/DL (ref 31.4–37.4)
MCV RBC AUTO: 97 FL (ref 82–98)
PLATELET # BLD AUTO: 148 THOUSANDS/UL (ref 149–390)
PMV BLD AUTO: 11.5 FL (ref 8.9–12.7)
POTASSIUM SERPL-SCNC: 3.7 MMOL/L (ref 3.5–5.3)
PROTHROMBIN TIME: 19.5 SECONDS (ref 11.6–14.5)
RBC # BLD AUTO: 2.57 MILLION/UL (ref 3.81–5.12)
SODIUM SERPL-SCNC: 135 MMOL/L (ref 136–145)
UNIT DISPENSE STATUS: NORMAL
UNIT PRODUCT CODE: NORMAL
UNIT RH: NORMAL
WBC # BLD AUTO: 7.46 THOUSAND/UL (ref 4.31–10.16)

## 2020-02-10 PROCEDURE — 99232 SBSQ HOSP IP/OBS MODERATE 35: CPT | Performed by: INTERNAL MEDICINE

## 2020-02-10 PROCEDURE — 85610 PROTHROMBIN TIME: CPT | Performed by: INTERNAL MEDICINE

## 2020-02-10 PROCEDURE — 83735 ASSAY OF MAGNESIUM: CPT | Performed by: INTERNAL MEDICINE

## 2020-02-10 PROCEDURE — 80048 BASIC METABOLIC PNL TOTAL CA: CPT | Performed by: INTERNAL MEDICINE

## 2020-02-10 PROCEDURE — 85027 COMPLETE CBC AUTOMATED: CPT | Performed by: INTERNAL MEDICINE

## 2020-02-10 PROCEDURE — 99239 HOSP IP/OBS DSCHRG MGMT >30: CPT | Performed by: INTERNAL MEDICINE

## 2020-02-10 PROCEDURE — 99233 SBSQ HOSP IP/OBS HIGH 50: CPT | Performed by: INTERNAL MEDICINE

## 2020-02-10 PROCEDURE — 82948 REAGENT STRIP/BLOOD GLUCOSE: CPT

## 2020-02-10 RX ORDER — LIDOCAINE 50 MG/G
1 PATCH TOPICAL DAILY
Refills: 0
Start: 2020-02-11

## 2020-02-10 RX ORDER — MECLIZINE HYDROCHLORIDE 25 MG/1
25 TABLET ORAL DAILY PRN
Status: DISCONTINUED | OUTPATIENT
Start: 2020-02-10 | End: 2020-02-11 | Stop reason: HOSPADM

## 2020-02-10 RX ORDER — WARFARIN SODIUM 4 MG/1
4 TABLET ORAL
Start: 2020-02-10

## 2020-02-10 RX ORDER — FUROSEMIDE 20 MG/1
10 TABLET ORAL DAILY
Refills: 0
Start: 2020-02-11

## 2020-02-10 RX ORDER — MAGNESIUM SULFATE HEPTAHYDRATE 40 MG/ML
2 INJECTION, SOLUTION INTRAVENOUS ONCE
Status: COMPLETED | OUTPATIENT
Start: 2020-02-10 | End: 2020-02-10

## 2020-02-10 RX ORDER — SODIUM CHLORIDE 1000 MG
2 TABLET, SOLUBLE MISCELLANEOUS 2 TIMES DAILY WITH MEALS
Refills: 0
Start: 2020-02-10

## 2020-02-10 RX ORDER — MIDODRINE HYDROCHLORIDE 10 MG/1
10 TABLET ORAL
Refills: 0
Start: 2020-02-10

## 2020-02-10 RX ORDER — GABAPENTIN 100 MG/1
100 CAPSULE ORAL
Refills: 0
Start: 2020-02-10

## 2020-02-10 RX ADMIN — WARFARIN SODIUM 4 MG: 1 TABLET ORAL at 17:07

## 2020-02-10 RX ADMIN — DIPHENHYDRAMINE HCL 25 MG: 25 TABLET, COATED ORAL at 19:40

## 2020-02-10 RX ADMIN — OXYCODONE HYDROCHLORIDE 5 MG: 5 TABLET ORAL at 17:09

## 2020-02-10 RX ADMIN — IVABRADINE 5 MG: 5 TABLET, FILM COATED ORAL at 17:10

## 2020-02-10 RX ADMIN — MIDODRINE HYDROCHLORIDE 10 MG: 5 TABLET ORAL at 11:21

## 2020-02-10 RX ADMIN — LIDOCAINE 1 PATCH: 50 PATCH TOPICAL at 09:08

## 2020-02-10 RX ADMIN — MONTELUKAST SODIUM 10 MG: 10 TABLET, FILM COATED ORAL at 21:07

## 2020-02-10 RX ADMIN — FENTANYL 12 MCG: 12 PATCH, EXTENDED RELEASE TRANSDERMAL at 11:23

## 2020-02-10 RX ADMIN — MEXILETINE HYDROCHLORIDE 150 MG: 150 CAPSULE ORAL at 17:06

## 2020-02-10 RX ADMIN — FUROSEMIDE 10 MG: 20 TABLET ORAL at 09:05

## 2020-02-10 RX ADMIN — HEPARIN SODIUM 5000 UNITS: 5000 INJECTION INTRAVENOUS; SUBCUTANEOUS at 21:07

## 2020-02-10 RX ADMIN — Medication 2 G: at 09:05

## 2020-02-10 RX ADMIN — LEVOTHYROXINE SODIUM 88 MCG: 88 TABLET ORAL at 05:23

## 2020-02-10 RX ADMIN — MEXILETINE HYDROCHLORIDE 150 MG: 150 CAPSULE ORAL at 09:05

## 2020-02-10 RX ADMIN — MAGNESIUM SULFATE HEPTAHYDRATE 2 G: 40 INJECTION, SOLUTION INTRAVENOUS at 11:21

## 2020-02-10 RX ADMIN — MIDODRINE HYDROCHLORIDE 10 MG: 5 TABLET ORAL at 09:05

## 2020-02-10 RX ADMIN — HEPARIN SODIUM 5000 UNITS: 5000 INJECTION INTRAVENOUS; SUBCUTANEOUS at 13:48

## 2020-02-10 RX ADMIN — POLYETHYLENE GLYCOL 3350 17 G: 17 POWDER, FOR SOLUTION ORAL at 09:05

## 2020-02-10 RX ADMIN — MEXILETINE HYDROCHLORIDE 150 MG: 150 CAPSULE ORAL at 21:07

## 2020-02-10 RX ADMIN — GABAPENTIN 100 MG: 100 CAPSULE ORAL at 21:07

## 2020-02-10 RX ADMIN — MIDODRINE HYDROCHLORIDE 10 MG: 5 TABLET ORAL at 17:10

## 2020-02-10 RX ADMIN — HEPARIN SODIUM 5000 UNITS: 5000 INJECTION INTRAVENOUS; SUBCUTANEOUS at 05:24

## 2020-02-10 RX ADMIN — INSULIN LISPRO 1 UNITS: 100 INJECTION, SOLUTION INTRAVENOUS; SUBCUTANEOUS at 21:06

## 2020-02-10 RX ADMIN — ASPIRIN 81 MG: 81 TABLET ORAL at 09:05

## 2020-02-10 RX ADMIN — PANTOPRAZOLE SODIUM 40 MG: 40 TABLET, DELAYED RELEASE ORAL at 05:23

## 2020-02-10 RX ADMIN — Medication 2 G: at 17:04

## 2020-02-10 RX ADMIN — IVABRADINE 5 MG: 5 TABLET, FILM COATED ORAL at 09:05

## 2020-02-10 RX ADMIN — LIDOCAINE 1 PATCH: 50 PATCH TOPICAL at 09:07

## 2020-02-10 NOTE — TRANSPORTATION MEDICAL NECESSITY
Section I - General Information    Name of Patient: Malaika Up                 : 1965    Medicare #: [id number]  Transport Date: 20 (PCS is valid for round trips on this date and for all repetitive trips in the 60-day range as noted below )  Origin: [address] [facility] [address]                                                         Destination: [facility], [address],  [facility], [address]  Is the pt's stay covered under Medicare Part A (PPS/DRG)   [x]     Closest appropriate facility? If no, why is transport to more distant facility required? Patient request  If hospice pt, is this transport related to pt's terminal illness? NA       Section II - Medical Necessity Questionnaire  Ambulance transportation is medically necessary only if other means of transport are contraindicated or would be potentially harmful to the patient  To meet this requirement, the patient must either be "bed confined" or suffer from a condition such that transport by means other than ambulance is contraindicated by the patient's condition  The following questions must be answered by the medical professional signing below for this form to be valid:    1)  Describe the MEDICAL CONDITION (physical and/or mental) of this patient AT [address] [place] [address] that requires the patient to be transported in an ambulance and why transport by other means is contraindicated by the patient's condition: Metastatic breast CA,anemia, hepatorenal syndrome, hyponatremia, cardiomegaly, Atrial Fib    2) Is the patient "bed confined" as defined below? Yes  To be "be confined" the patient must satisfy all three of the following conditions: (1) unable to get up from bed without Assistance; AND (2) unable to ambulate; AND (3) unable to sit in a chair or wheelchair  3) Can this patient safely be transported by car or wheelchair van (i e , seated during transport without a medical attendant or monitoring)? No    4) In addition to completing questions 1-3 above, please check any of the following conditions that apply    *Note: supporting documentation for any boxes checked must be maintained in the patient's medical records  If hosp-hosp transfer, describe services needed at 2nd facility not available at 1st facility? Moderate/severe pain on movement   Requires oxygen-unable to self administer  Unable to tolerate seated position for time needed to transport       Section III - Signature of Physician or Healthcare Professional  I certify that the above information is true and correct based on my evaluation of this patient, and represent that the patient requires transport by ambulance and that other forms of transport are contraindicated  I understand that this information will be used by the Centers for Medicare and Medicaid Services (CMS) to support the determination of medical necessity for ambulance services, and I represent that I have personal knowledge of the patient's condition at time of transport  []  If this box is checked, I also certify that the patient is physically or mentally incapable of signing the ambulance service's claim and that the institution with which I am affiliated has furnished care, services, or assistance to the patient  My signature below is made on behalf of the patient pursuant to 42 CFR §424 36(b)(4)  In accordance with 42 CFR §424 37, the specific reason(s) that the patient is physically or mentally incapable of signing the claim form is as follows:  Ken Alexander of Physician* or Healthcare Professional______________________________________________________________  Signature Date 02/10/20 (For scheduled repetitive transports, this form is not valid for transports performed more than 60 days after this date)    Printed Name & Credentials of Physician or Healthcare Professional (MD, DO, RN, etc )JUAN Paiz________________________________  *Form must be signed by patient's attending physician for scheduled, repetitive transports   For non-repetitive, unscheduled ambulance transports, if unable to obtain the signature of the attending physician, any of the following may sign (choose appropriate option below)  [] Physician Assistant []  Clinical Nurse Specialist []  Registered Nurse  []  Nurse Practitioner  [x] Discharge Planner

## 2020-02-10 NOTE — SOCIAL WORK
Bart contacted [facility],, spoke with Zee Young to schedule BLS transport  Per Zee Young he probably would not be able to get transport for today and because it is 5 1/2 hours away would need manager  Will f/u with bart on a decision

## 2020-02-10 NOTE — SOCIAL WORK
Call placed to [facility] and spoke to [place]  After brother calls with credit card information they will search for crew to do transport  Called brother, Barak Trejo, to explain  Gave him [facility] phone # and sent it to him via email per his request      Call received from [place] with [facility] to report brother has called with credit card information  [facility] will search for crew to do transport Tuesday 2/11/20  Informed Dr Cecilia George and Agata Coates, admissions at [facility], [phone] ext [phone]/ Left message that we are working on transport for [address], 2/11/20

## 2020-02-10 NOTE — PROGRESS NOTES
Progress Note - Lesia Espinoza 1965, 49 y o  female MRN: [id number]    Unit/Bed#: [facility] 234-60 Encounter: [id number]    Primary Care Provider: James Herrera MD   Date and time admitted to hospital: 1/29/2020 11:33 AM        Anemia  Assessment & Plan  Likely multifactorial including chronic disease/malignancy, multiple blood draws  Improved yesterday after transfusion  Transfuse 1 unit packed red cells today  Patient denies any black or blood in stools  Repeat CBC in a m  Hepatorenal syndrome ([address] )  Assessment & Plan  Resolved  continue midodrine  Monitor BMP    Hyponatremia  Assessment & Plan  stable  Continue fluid restriction  Continue sodium chloride tablets  Continue Lasix 10 mg daily        Type 2 diabetes mellitus without complication, without long-term current use of insulin [facility])  Assessment & Plan  No results found for: HGBA1C    Recent Labs     02/09/20  1607 02/09/20  2046 02/10/20  0600 02/10/20  1040   POCGLU 129 110 77 144*       Blood Sugar Average: Last 72 hrs:  (P) 131 [id number]  SSI, accuchecks  Diet controlled    Cardiomyopathy secondary to drug [facility])  Assessment & Plan  Nonischemic cardiomyopathy, previous EF 25%,  now 36%  Secondary to chemotherapy       Chronic systolic heart failure (HCC)  Assessment & Plan  Wt Readings from Last 3 Encounters:   02/10/20 72 6 kg (160 lb 0 9 oz)     EF 36%  Stable at this time without shortness of breath  Monitor daily weights  Monitor volume status    Elevated LFTs  Assessment & Plan  Likely related to worsening metastasis liver disease  Jaundice  Liver ultrasound with widespread metastatic disease  Abdominal CT scan with pneumobilia  Evaluated by GI, no biliary obstruction  Continue supportive care        Metastatic breast cancer [facility])  Assessment & Plan  With metastasis to bone and liver, ongoing low back pain  Evaluated by Oncology, recommendation for follow-up with her oncologist  Poor prognosis  Palliative Care is following  Patient currently like to continue with full code and with above treatment    Chronic atrial fibrillation  Assessment & Plan  Rate controlled  Continue corlanor and mexilitine  Digoxin held  INR subtherapeutic  continue warfarin to 4 mg    Ventricular tachycardia (HCC)  Assessment & Plan  No arrhythmia on ICD interrogation  Stable on corelanor and mexiletine    * SOB (shortness of breath)  Assessment & Plan  · Patient states that prior shortness of breath has been associated with V-tach in the past, as per EMS she had 5 beat run of V-tach  · Chest x-ray shows no acute cardiopulmonary pathology  · Cardiac echo with EF 36%  · Evaluated by Cardiology, no arrhythmia on ICD interrogation, no sign of CHF  · Resolved      Supratherapeutic INRresolved as of 2020  Assessment & Plan  History of RA thrombus on Coumadin  given 5 mg IV vitamin K in ER  Monitor off Coumadin        VTE Pharmacologic Prophylaxis:   Pharmacologic: Heparin  Mechanical VTE Prophylaxis in Place: Yes    Patient Centered Rounds: I have performed bedside rounds with nursing staff today  Education and Discussions with Family / Patient: patient, plan of care    Time Spent for Care: 20 minutes  More than 50% of total time spent on counseling and coordination of care as described above  Current Length of Stay: 12 day(s)    Current Patient Status: Inpatient   Certification Statement: The patient will continue to require additional inpatient hospital stay due to discharge planning    Discharge Plan: Inpt rehab in [facility] tomorrow    Code Status: Level 1 - Full Code      Subjective:   Denies any acute complaints  Moving her BMs now  Tolerating diet, pain better controlled      Objective:     Vitals:   Temp (24hrs), Av 6 °F (37 °C), Min:98 3 °F (36 8 °C), Max:98 8 °F (37 1 °C)    Temp:  [98 3 °F (36 8 °C)-98 8 °F (37 1 °C)] 98 3 °F (36 8 °C)  HR:  [74-76] 76  Resp:  [16-20] 16  BP: (103-125)/(52-57) 125/57  SpO2:  [93 %-96 %] 93 %  Body mass index is 24 34 kg/m²  Input and Output Summary (last 24 hours): Intake/Output Summary (Last 24 hours) at 2/10/2020 1230  Last data filed at 2/10/2020 0911  Gross per 24 hour   Intake 1010 ml   Output 1900 ml   Net -890 ml       Physical Exam:     Physical Exam   Constitutional: She is oriented to person, place, and time  She appears well-developed and well-nourished  HENT:   Head: Normocephalic and atraumatic  Eyes: Pupils are equal, round, and reactive to light  EOM are normal    Neck: Neck supple  Cardiovascular: Normal rate and regular rhythm  Pulmonary/Chest: Effort normal    Abdominal: Soft  Musculoskeletal: She exhibits no edema  Neurological: She is alert and oriented to person, place, and time  Skin: Skin is warm and dry  Additional Data:     Labs:    Results from last 7 days   Lab Units 02/10/20  0527   WBC Thousand/uL 7 46   HEMOGLOBIN g/dL 7 6*   HEMATOCRIT % 24 8*   PLATELETS Thousands/uL 148*     Results from last 7 days   Lab Units 02/10/20  0527  02/06/20  0605   SODIUM mmol/L 135*   < > 133*   POTASSIUM mmol/L 3 7   < > 3 8   CHLORIDE mmol/L 100   < > 101   CO2 mmol/L 27   < > 29   BUN mg/dL 10   < > 11   CREATININE mg/dL 0 64   < > 0 64   ANION GAP mmol/L 8   < > 3*   CALCIUM mg/dL 9 7   < > 9 6   ALBUMIN g/dL  --   --  2 0*   TOTAL BILIRUBIN mg/dL  --   --  5 30*   ALK PHOS U/L  --   --  901*   ALT U/L  --   --  85*   AST U/L  --   --  137*   GLUCOSE RANDOM mg/dL 68   < > 112    < > = values in this interval not displayed  Results from last 7 days   Lab Units 02/10/20  0527   INR  1 70*     Results from last 7 days   Lab Units 02/10/20  1040 02/10/20  0600 02/09/20  2046 02/09/20  1607 02/09/20  1034 02/09/20  0632 02/08/20  2117 02/08/20  1636 02/08/20  1032 02/08/20  0616 02/07/20  2138 02/07/20  1648   POC GLUCOSE mg/dl 144* 77 110 129 156* 96 143* 120 181* 81 215* 126                   * I Have Reviewed All Lab Data Listed Above    * Additional Pertinent Lab Tests Reviewed: All Labs Within Last 24 Hours Reviewed      Recent Cultures (last 7 days):           Last 24 Hours Medication List:     Current Facility-Administered Medications:  aspirin 81 mg Oral Daily Shira R TEODORA Lala   diphenhydrAMINE 25 mg Oral Q6H PRN Oscar Muniz PA-C   fentaNYL 12 mcg Transdermal Q72H Shira R TEODORA Lala   fentanyl citrate (PF) 50 mcg Intravenous Q2H PRN Kika M Bendas, DO   furosemide 10 mg Oral Daily Jeanne MD Kendall   gabapentin 100 mg Oral HS Shira R TEODORA Lala   heparin (porcine) 5,000 Units Subcutaneous Q8H [address] Shira R TEODORA Lala   insulin lispro 1-5 Units Subcutaneous 4x Daily (AC & HS) Lupe Lala PA-C   ivabradine HCl 5 mg Oral BID With Meals Perla Franco PA-C   levothyroxine 88 mcg Oral Early Morning Lupe Lala PA-C   lidocaine 1 patch Topical Daily Lupe Lala PA-C   lidocaine 1 patch Topical Daily Lupe Lala PA-C   magnesium sulfate 2 g Intravenous Once Dulce Najjar, CRNP   meclizine 25 mg Oral Daily PRN Kenya Humphries MD   mexiletine 150 mg Oral TID Perla Franco PA-C   midodrine 10 mg Oral TID AC Shira JONO Lala PA-C   montelukast 10 mg Oral HS Shira Lala PA-C   ondansetron 4 mg Intravenous Q4H PRN Perla Franco PA-C   oxyCODONE 10 mg Oral Q4H PRN Kika M Bendas, DO   oxyCODONE 5 mg Oral Q4H PRN Lupe Lala PA-C   pantoprazole 40 mg Oral Early Morning Shira R TEODORA Lala   polyethylene glycol 17 g Oral Daily Shira JONO Lala PA-C   sodium chloride 2 g Oral BID With Meals Jeanne MD Kendall   warfarin 4 mg Oral Daily (warfarin) Kenya Humphries MD        Today, Patient Was Seen By: Talat Rogers MD    ** Please Note: Dictation voice to text software may have been used in the creation of this document   **

## 2020-02-10 NOTE — ASSESSMENT & PLAN NOTE
Wt Readings from Last 3 Encounters:   02/10/20 72 6 kg (160 lb 0 9 oz)     EF 36%  Stable at this time without shortness of breath  Monitor daily weights  Monitor volume status

## 2020-02-10 NOTE — ASSESSMENT & PLAN NOTE
No results found for: HGBA1C    Recent Labs     02/09/20  1607 02/09/20  2046 02/10/20  0600 02/10/20  1040   POCGLU 129 110 77 144*       Blood Sugar Average: Last 72 hrs:  (P) 131 [id number]  SSI, accuchecks  Diet controlled

## 2020-02-10 NOTE — SOCIAL WORK
Call received from [facility], that they have scheduled transport for sn in [place] for Tuesday, 2/11/20, 11 am pickup  Nicole Braswell will call brother  Amber June, admissions at [facility] to inform her of 11 am pickup  Completed CMN and placed copy in Medical records bin

## 2020-02-10 NOTE — PROGRESS NOTES
Progress Note - Nephrology   Lazarus Owens 49 y o  female MRN: [id number]  Unit/Bed#: [facility] 519-01 Encounter: [id number]  ASSESSMENT and PLAN:  1  Acute kidney injury:  Resolved  -initially multifactorial and likely prerenal in the setting of blood loss anemia and hypotension  -required inotropics support, transfusions  -peak creatinine 2 4 and resolved 0 64 and stable  -continue to avoid nephrotoxins and NSAIDs  -avoid blood pressure fluctuations and hypotension  -previous urinalysis bland without hematuria or proteinuria on 01/31/2020  -CT scan without contrast did not reveal hydronephrosis  -continue trend I/O, lab values volume status while admitted    2  Hyponatremia:  Suspect 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  -workup: urine sodium 81, urine osmolality 367, serum osmolality 285   -status post tolvaptan x1 with improvement  -currently on sodium salt tablets 2 g p o  B i d  And Lasix 10 mg daily  -continues on fluid restriction 1 5 L per day  -patient to be followed as outpatient in local area on discharge    3  Blood pressure: Acceptable  -maximize hemodynamics to maintain MAP >65  -avoid hypotension or fluctuations in blood pressure  -will continue to trend    4  H&H/anemia:  Status post transfusion yesterday for hemoglobin 6 9  -current hemoglobin 7 6  -transfuse if hemoglobin less than 7 0  -continue to trend    5  Acid-base:  Stable with bicarb of 27    6  Mild hypo magnesium:  Current magnesium 1 6  -will give 2 g IV over 4 hours  -will check magnesium in a m      7  Hypotension:  Resolved and improved  -status post pressors  -blood cultures negative to date  -recent echocardiogram revealed EF of 36% with grade 1 diastolic dysfunction-no pericardial effusion    8  Metastatic breast cancer with bone involvement  -palliative Care following    Disposition:  Per case management, plan for discharge to [facility] to be closer to family  -okay for discharge from renal standpoint    Will need ongoing follow-up for hyponatremia with primary care local area    SUBJECTIVE:  Patient seen and examined    Denies chest pain, shortness of breath, dizziness, lightheadedness, nausea vomiting    OBJECTIVE:  Current Weight: Weight - Scale: 72 6 kg (160 lb 0 9 oz)  Vitals:    02/09/20 1526 02/09/20 2348 02/10/20 0600 02/10/20 0700   BP: 114/54 118/56  125/57   BP Location:  Left arm  Left arm   Pulse: 75 75  76   Resp: 20 16  16   Temp: 98 6 °F (37 °C) 98 8 °F (37 1 °C)  98 3 °F (36 8 °C)   TempSrc: Oral Oral  Oral   SpO2: 93% 94%  93%   Weight:   72 6 kg (160 lb 0 9 oz)    Height:           Intake/Output Summary (Last 24 hours) at 2/10/2020 1049  Last data filed at 2/10/2020 0911  Gross per 24 hour   Intake 1010 ml   Output 2500 ml   Net -1490 ml     General:  No acute distress, lying in bed,  cooperative,   Skin:  Warm and dry without rash  ENMT:  Mucous membranes moist, sclera anicteric, tongue is midline  Neck:  Supple without JVD  Respiratory:  Essentially clear on auscultation without crackles, rhonchi, wheezes  Cardiac:  Regular rate and rhythm without rub, or murmur  GI:  Soft, nontender, no distention, active bowel sounds  Extremities:  No significant edema noted bilaterally  Neuro:  Alert oriented and awake, no gross deficits  Psych:  Appropriate affect    Medications:    Current Facility-Administered Medications:     aspirin (ECOTRIN LOW STRENGTH) EC tablet 81 mg, 81 mg, Oral, Daily, Laila Lala PA-C, 81 mg at 02/10/20 0905    diphenhydrAMINE (BENADRYL) tablet 25 mg, 25 mg, Oral, Q6H PRN, Dante Blair PA-C, 25 mg at 02/09/20 0605    fentaNYL (DURAGESIC) 12 mcg/hr TD 72 hr patch 12 mcg, 12 mcg, Transdermal, Q72H, Shira OJNO Lala PA-C, 12 mcg at 02/07/20 1220    fentanyl citrate (PF) 100 MCG/2ML 50 mcg, 50 mcg, Intravenous, Q2H PRN, Kika Boles DO    furosemide (LASIX) tablet 10 mg, 10 mg, Oral, Daily, Jorge Ortega MD, 10 mg at 02/10/20 0905    gabapentin (NEURONTIN) capsule 100 mg, 100 mg, Oral, HS, BUCKY Woods-C, 100 mg at 02/09/20 2204    heparin (porcine) subcutaneous injection 5,000 Units, 5,000 Units, Subcutaneous, Q8H [facility], Osvaldo Lala PA-C, 5,000 Units at 02/10/20 0524    insulin lispro (HumaLOG) 100 units/mL subcutaneous injection 1-5 Units, 1-5 Units, Subcutaneous, 4x Daily (AC & HS), 1 Units at 02/09/20 1202 **AND** Fingerstick Glucose (POCT), , , 4x Daily AC and at bedtime, Osvaldo Lala PA-C    ivabradine HCl (CORLANOR) tablet 5 mg, 5 mg, Oral, BID With Meals, Ken Loving PA-C, 5 mg at 02/10/20 0905    levothyroxine tablet 88 mcg, 88 mcg, Oral, Early Morning, SILVANA ReddyC, 88 mcg at 02/10/20 0523    lidocaine (LIDODERM) 5 % patch 1 patch, 1 patch, Topical, Daily, BUCKY Woods-C, 1 patch at 02/10/20 0908    lidocaine (LIDODERM) 5 % patch 1 patch, 1 patch, Topical, Daily, BUCKY Woods-C, 1 patch at 02/10/20 0907    magnesium sulfate 2 g/50 mL IVPB (premix) 2 g, 2 g, Intravenous, Once, JESSI Meyer    meclizine (ANTIVERT) tablet 25 mg, 25 mg, Oral, Daily PRN, Walter Koehler MD    mexiletine (MEXITIL) capsule 150 mg, 150 mg, Oral, TID, BUCKY Woods-C, 150 mg at 02/10/20 0905    midodrine (PROAMATINE) tablet 10 mg, 10 mg, Oral, TID AC, BUCKY Reddy-C, 10 mg at 02/10/20 0905    montelukast (SINGULAIR) tablet 10 mg, 10 mg, Oral, HS, BUCKY Woods-C, 10 mg at 02/09/20 2204    ondansetron (ZOFRAN) injection 4 mg, 4 mg, Intravenous, Q4H PRN, Osvaldo Lala PA-C, 4 mg at 02/08/20 2123    oxyCODONE (ROXICODONE) immediate release tablet 10 mg, 10 mg, Oral, Q4H PRN, Kika Boles DO    oxyCODONE (ROXICODONE) IR tablet 5 mg, 5 mg, Oral, Q4H PRN, Osvaldo Lala PA-C, 5 mg at 02/08/20 2122    pantoprazole (PROTONIX) EC tablet 40 mg, 40 mg, Oral, Early Morning, Shira Lala PA-C, 40 mg at 02/10/20 0523    polyethylene glycol (MIRALAX) packet 17 g, 17 g, Oral, Daily, Alfreda Lala PA-C, 17 g at 02/10/20 2028    sodium chloride tablet 2 g, 2 g, Oral, BID With Meals, Myriam Diana MD, 2 g at 02/10/20 0905    warfarin (COUMADIN) tablet 4 mg, 4 mg, Oral, Daily (warfarin), Lavinia Castro MD, 4 mg at 02/09/20 1752    Laboratory Results:  Results from last 7 days   Lab Units 02/10/20  0527 02/09/20  0450 02/08/20  0509 02/07/20  0531 02/06/20  0605 02/05/20  0612 02/04/20  0543   WBC Thousand/uL 7 46 6 33  --  8 03 7 48 8 93 11 05*   HEMOGLOBIN g/dL 7 6* 6 9*  --  7 5* 7 1* 7 3* 7 4*   HEMATOCRIT % 24 8* 21 8*  --  24 1* 22 6* 23 1* 22 4*   PLATELETS Thousands/uL 148* 149  --  166 153 165 181   SODIUM mmol/L 135* 134* 135* 132* 133* 135* 133*   POTASSIUM mmol/L 3 7 3 8 3 6 4 1 3 8 4 1 3 7   CHLORIDE mmol/L 100 101 101 100 101 102 101   CO2 mmol/L 27 28 28 29 29 28 27   BUN mg/dL 10 11 11 13 11 13 13   CREATININE mg/dL 0 64 0 68 0 62 0 77 0 64 0 70 0 78   CALCIUM mg/dL 9 7 9 3 9 7 9 5 9 6 9 9 9 9   MAGNESIUM mg/dL 1 6  --   --   --   --  1 5* 1 6   PHOSPHORUS mg/dL  --   --   --   --   --  3 6  --

## 2020-02-10 NOTE — DISCHARGE SUMMARY
Discharge Summary - [facility] Internal Medicine    Patient Information: Brenda Vo 49 y o  female MRN: [id number]  Unit/Bed#: [facility] 563-24 Encounter: [id number]    Discharging Physician / Practitioner: Babita Herbert MD  PCP: Akira Soriano MD  Admission Date: 1/29/2020  Discharge Date: 02/10/20    Disposition:      Short Term Rehab or SNF at [facility] at [address] [place] [address] SNF:   · Not Applicable to this Patient - Not Applicable to this Patient    Reason for Admission:  Exertional dyspnea    Discharge Diagnoses:     Principal Problem:    SOB (shortness of breath)  Active Problems:    Ventricular tachycardia (HCC)    Chronic atrial fibrillation    Metastatic breast cancer (HCC)    Elevated LFTs    Chronic systolic heart failure ([address] )    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[address] )    Type 2 diabetes mellitus without complication, without long-term current use of insulin ([address] )    Hyponatremia    Palliative care patient    Hepatorenal syndrome ([address] )    Anemia  Resolved Problems:    Supratherapeutic INR      Consultations During Hospital Stay:  · Cardiology  · Palliative care  · Medical oncology  · Nephrology  · Gastroenterology  · Critical care    Procedures Performed:     · Central line insertion    Medication Adjustments and Discharge Medications:  · Summary of Medication Adjustments made as a result of this hospitalization:  See med rec  · Medication Dosing Tapers - Please refer to Discharge Medication List for details on any medication dosing tapers (if applicable to patient)  · Medications being temporarily held (include recommended restart time):  None  · Discharge Medication List: See after visit summary for reconciled discharge medications  Wound Care Recommendations:  When applicable, please see wound care section of After Visit Summary      Diet Recommendations at Discharge:  Diet -        Diet Orders   (From admission, onward)             Start     Ordered 02/03/20 1442  Diet Regular; Regular House; Fluid Restriction 1500 ML  Diet effective now     Question Answer Comment   Diet Type Regular    Regular Regular House    Other Restriction(s): Fluid Restriction 1500 ML    RD to adjust diet per protocol? Yes        02/03/20 1445              Fluid Restriction - New Fluid Restriction at Discharge: 1500 ml/day  Instructions for any Catheters / Lines Present at Discharge (including removal date, if applicable): None    Significant Findings / Test Results:     · Chest x-ray:  Left-sided ICD  · Right upper quadrant ultrasound: Moderate hepatomegaly with diffuse heterogenous nodular liver parenchyma  Sludge and small gallstones within the gallbladder without evidence of acute cholecystitis  · CT abdomen and pelvis:  Redemonstration of diffuse metastatic hepatic disease  Diffuse osseous metastatic disease  No pathologic fractures  Cholelithiasis without CT evidence for acute cholecystitis  · LEFT VENTRICLE:  The ventricle was mildly dilated  Systolic function was moderately reduced  Ejection fraction was estimated to be 36 %  Excessive trabeculation was noted at the left ventricular apex  Repeat study with intravenous echo contrast or cardiac magnetic resonance imaging is  recommended for further evaluation and exclusion of non-compaction cardiomyopathy  There was moderate diffuse hypokinesis  The changes were consistent with eccentric hypertrophy  Doppler parameters were consistent with abnormal left ventricular relaxation (grade 1 diastolic dysfunction)    TRICUSPID VALVE:  There was trace regurgitation  Incidental Findings:   · None    Test Results Pending at Discharge (will require follow up):    · None     Outpatient Tests Requested:  · CBC and BMP within 1 week    Complications:  None    Hospital Course:     Dixon Tellez is a 49 y o  female patient with past medical history of metastatic breast cancer, cardiomyopathy, atrial fibrillation, chronic systolic heart failure, type 2 diabetes who originally presented to the hospital on 1/29/2020 due to exertional dyspnea  She was noted to have a 5 be run of V-tach on route to the ED  INR was found to be supratherapeutic and was reversed with 5 mg of vitamin K  ICD Device interrogation showed no significant events  While inpatient she developed hypotension which is treated with IV fluid boluses, blood pressure did not respond and critical care was consulted  Her echocardiogram demonstrated ejection fraction of 35%, CT demonstrated multiple liver Mets  Central line was placed and she was given IV vasopressors  She developed SACHI but this improved with pressor support  She also received 1 dose of Samsca with significant improvement to her hyponatremia  Her chronic metastatic pain she was placed on fentanyl patch and oral opiates  When her condition stabilized she was transitioned from the vasopressors to midodrine  With this her renal function and blood pressure remained stable  For her hyponatremia she was transition to a fluid restriction, sodium tablets, p o  Lasix  After discussions with palliative care and her Specialists the consensus was to plan hospice  The patient requested hospice in the [place] due to family members living in that area  Due to hypotension her home medications Coreg, torsemide spironolactone were held on discharge  Patient was started on midodrine as well as low-dose Lasix in addition to salt tablets  Will need outpatient follow-up further management of blood pressure  She was restarted on her home dose of metformin on discharge for diabetes  Condition at Discharge: fair     Discharge Day Visit / Exam:     Subjective:  Patient seen and examined  No acute complaints at this time  No fevers or chills  No chest pain  Currently blood pressure is improved    Vitals: Blood Pressure: 125/57 (02/10/20 0700)  Pulse: 76 (02/10/20 0700)  Temperature: 98 3 °F (36 8 °C) (02/10/20 0700)  Temp Source: Oral (02/10/20 0700)  Respirations: 16 (02/10/20 0700)  Height: 5' 8" (172 7 cm) (01/29/20 2009)  Weight - Scale: 72 6 kg (160 lb 0 9 oz) (02/10/20 0600)  SpO2: 93 % (02/10/20 0700)  Exam:   Physical Exam   Constitutional: She is oriented to person, place, and time  She appears well-developed and well-nourished  HENT:   Head: Normocephalic and atraumatic  Eyes: Pupils are equal, round, and reactive to light  EOM are normal    Neck: Normal range of motion  Neck supple  Cardiovascular: Normal rate and regular rhythm  Pulmonary/Chest: Effort normal and breath sounds normal    Abdominal: Soft  Bowel sounds are normal    Musculoskeletal: Normal range of motion  Neurological: She is alert and oriented to person, place, and time  Skin: Skin is warm and dry  Discussion with Family:  Patient    Goals of Care Discussions:  · Code Status at Discharge: Level 1 - Full Code  · Were there any Goals of Care Discussions during Hospitalization?: Yes  · Results of any General Goals of Care Discussions:  Transfer to SNF in [facility] and changed to hospice afterwards  · POLST Completed: No   · If POLST Completed, Summary of POLST Agreement Provided Here: N/A   · OK to Rehospitalize if Needed? Yes    Discharge instructions/Information to patient and family:   See after visit summary section titled Discharge Instructions for information provided to patient and family  Planned Readmission: No     Discharge Statement:  I spent 35 minutes discharging the patient  This time was spent on the day of discharge  I had direct contact with the patient on the day of discharge  Greater than 50% of the total time was spent examining patient, answering all patient questions, arranging and discussing plan of care with patient as well as directly providing post-discharge instructions  Additional time then spent on discharge activities      ** Please Note: This note has been constructed using a voice recognition system **

## 2020-02-10 NOTE — ASSESSMENT & PLAN NOTE
Rate controlled  Continue corlanor and mexilitine  Digoxin held  INR subtherapeutic  continue warfarin to 4 mg

## 2020-02-10 NOTE — ASSESSMENT & PLAN NOTE
Likely multifactorial including chronic disease/malignancy, multiple blood draws  Improved yesterday after transfusion  Transfuse 1 unit packed red cells today  Patient denies any black or blood in stools  Repeat CBC in a m

## 2020-02-11 VITALS
SYSTOLIC BLOOD PRESSURE: 110 MMHG | TEMPERATURE: 98.7 F | BODY MASS INDEX: 24.22 KG/M2 | RESPIRATION RATE: 17 BRPM | HEIGHT: 68 IN | DIASTOLIC BLOOD PRESSURE: 49 MMHG | HEART RATE: 80 BPM | WEIGHT: 159.83 LBS | OXYGEN SATURATION: 100 %

## 2020-02-11 LAB
ANION GAP SERPL CALCULATED.3IONS-SCNC: 6 MMOL/L (ref 4–13)
BUN SERPL-MCNC: 9 MG/DL (ref 5–25)
CALCIUM SERPL-MCNC: 9.7 MG/DL (ref 8.3–10.1)
CHLORIDE SERPL-SCNC: 102 MMOL/L (ref 100–108)
CO2 SERPL-SCNC: 27 MMOL/L (ref 21–32)
CREAT SERPL-MCNC: 0.68 MG/DL (ref 0.6–1.3)
ERYTHROCYTE [DISTWIDTH] IN BLOOD BY AUTOMATED COUNT: 24.1 % (ref 11.6–15.1)
GFR SERPL CREATININE-BSD FRML MDRD: 100 ML/MIN/1.73SQ M
GLUCOSE SERPL-MCNC: 205 MG/DL (ref 65–140)
GLUCOSE SERPL-MCNC: 80 MG/DL (ref 65–140)
GLUCOSE SERPL-MCNC: 80 MG/DL (ref 65–140)
HCT VFR BLD AUTO: 24.7 % (ref 34.8–46.1)
HGB BLD-MCNC: 7.7 G/DL (ref 11.5–15.4)
MAGNESIUM SERPL-MCNC: 1.8 MG/DL (ref 1.6–2.6)
MCH RBC QN AUTO: 30.7 PG (ref 26.8–34.3)
MCHC RBC AUTO-ENTMCNC: 31.2 G/DL (ref 31.4–37.4)
MCV RBC AUTO: 98 FL (ref 82–98)
PLATELET # BLD AUTO: 178 THOUSANDS/UL (ref 149–390)
PMV BLD AUTO: 11.4 FL (ref 8.9–12.7)
POTASSIUM SERPL-SCNC: 3.6 MMOL/L (ref 3.5–5.3)
RBC # BLD AUTO: 2.51 MILLION/UL (ref 3.81–5.12)
SODIUM SERPL-SCNC: 135 MMOL/L (ref 136–145)
WBC # BLD AUTO: 8.1 THOUSAND/UL (ref 4.31–10.16)

## 2020-02-11 PROCEDURE — 83735 ASSAY OF MAGNESIUM: CPT | Performed by: NURSE PRACTITIONER

## 2020-02-11 PROCEDURE — 80048 BASIC METABOLIC PNL TOTAL CA: CPT | Performed by: INTERNAL MEDICINE

## 2020-02-11 PROCEDURE — 82948 REAGENT STRIP/BLOOD GLUCOSE: CPT

## 2020-02-11 PROCEDURE — 85027 COMPLETE CBC AUTOMATED: CPT | Performed by: INTERNAL MEDICINE

## 2020-02-11 RX ORDER — SODIUM CHLORIDE 1000 MG
2 TABLET, SOLUBLE MISCELLANEOUS 2 TIMES DAILY WITH MEALS
Refills: 0
Start: 2020-02-11

## 2020-02-11 RX ORDER — MECLIZINE HYDROCHLORIDE 25 MG/1
25 TABLET ORAL DAILY PRN
Qty: 30 TABLET | Refills: 0
Start: 2020-02-11

## 2020-02-11 RX ORDER — LIDOCAINE 50 MG/G
1 PATCH TOPICAL DAILY
Refills: 0
Start: 2020-02-12

## 2020-02-11 RX ORDER — FENTANYL 12 UG/H
1 PATCH TRANSDERMAL
Qty: 5 PATCH | Refills: 0 | Status: SHIPPED | OUTPATIENT
Start: 2020-02-13 | End: 2020-02-23

## 2020-02-11 RX ORDER — GABAPENTIN 100 MG/1
100 CAPSULE ORAL
Refills: 0
Start: 2020-02-11

## 2020-02-11 RX ORDER — FENTANYL 12 UG/H
1 PATCH TRANSDERMAL
Qty: 3 PATCH | Refills: 0 | Status: CANCELLED | OUTPATIENT
Start: 2020-02-13 | End: 2020-02-23

## 2020-02-11 RX ORDER — MIDODRINE HYDROCHLORIDE 10 MG/1
10 TABLET ORAL
Refills: 0
Start: 2020-02-11

## 2020-02-11 RX ORDER — FUROSEMIDE 20 MG/1
10 TABLET ORAL DAILY
Refills: 0
Start: 2020-02-12

## 2020-02-11 RX ORDER — OXYCODONE HYDROCHLORIDE 5 MG/1
5 TABLET ORAL EVERY 4 HOURS PRN
Qty: 30 TABLET | Refills: 0 | Status: SHIPPED | OUTPATIENT
Start: 2020-02-11 | End: 2020-02-21

## 2020-02-11 RX ADMIN — MIDODRINE HYDROCHLORIDE 10 MG: 5 TABLET ORAL at 10:46

## 2020-02-11 RX ADMIN — IVABRADINE 5 MG: 5 TABLET, FILM COATED ORAL at 09:03

## 2020-02-11 RX ADMIN — Medication 2 G: at 09:03

## 2020-02-11 RX ADMIN — ASPIRIN 81 MG: 81 TABLET ORAL at 09:02

## 2020-02-11 RX ADMIN — LIDOCAINE 1 PATCH: 50 PATCH TOPICAL at 09:04

## 2020-02-11 RX ADMIN — MIDODRINE HYDROCHLORIDE 10 MG: 5 TABLET ORAL at 06:21

## 2020-02-11 RX ADMIN — LIDOCAINE 1 PATCH: 50 PATCH TOPICAL at 09:05

## 2020-02-11 RX ADMIN — FUROSEMIDE 10 MG: 20 TABLET ORAL at 09:02

## 2020-02-11 RX ADMIN — HEPARIN SODIUM 5000 UNITS: 5000 INJECTION INTRAVENOUS; SUBCUTANEOUS at 06:22

## 2020-02-11 RX ADMIN — PANTOPRAZOLE SODIUM 40 MG: 40 TABLET, DELAYED RELEASE ORAL at 06:21

## 2020-02-11 RX ADMIN — MECLIZINE HYDROCHLORIDE 25 MG: 25 TABLET ORAL at 10:44

## 2020-02-11 RX ADMIN — LEVOTHYROXINE SODIUM 88 MCG: 88 TABLET ORAL at 06:21

## 2020-02-11 RX ADMIN — MEXILETINE HYDROCHLORIDE 150 MG: 150 CAPSULE ORAL at 09:02

## 2020-02-11 NOTE — TREATMENT PLAN
Continue salt tablets 2 g p o  B i d  As well as Lasix 10 mg daily, fluid restriction 1 5 L per day  sNa stable at 135  Should have BMP monitored at the [facility]  Nephrology will sign off

## 2020-02-11 NOTE — SOCIAL WORK
Spoke to batsheva Valenzuela, [facility] to confirm plan for admission to that facility today  Provided her with patient [phone]  Faxed to her the [address] form and patient's MAR  Called patient's brother, Latanya Zamudio, to review discharge today  Answered questions about future hospice services  Encouraged him to contact [facility], admissions, and provided him with phone #

## 2020-02-11 NOTE — PLAN OF CARE
Problem: Potential for Falls  Goal: Patient will remain free of falls  Description  INTERVENTIONS:  - Assess patient frequently for physical needs  -  Identify cognitive and physical deficits and behaviors that affect risk of falls    -  [place] fall precautions as indicated by assessment   - Educate patient/family on patient safety including physical limitations  - Instruct patient to call for assistance with activity based on assessment  - Modify environment to reduce risk of injury  - Consider OT/PT consult to assist with strengthening/mobility  Outcome: Progressing     Problem: PAIN - ADULT  Goal: Verbalizes/displays adequate comfort level or baseline comfort level  Description  Interventions:  - Encourage patient to monitor pain and request assistance  - Assess pain using appropriate pain scale  - Administer analgesics based on type and severity of pain and evaluate response  - Implement non-pharmacological measures as appropriate and evaluate response  - Consider cultural and social influences on pain and pain management  - Notify physician/advanced practitioner if interventions unsuccessful or patient reports new pain  Outcome: Progressing     Problem: INFECTION - ADULT  Goal: Absence or prevention of progression during hospitalization  Description  INTERVENTIONS:  - Assess and monitor for signs and symptoms of infection  - Monitor lab/diagnostic results  - Monitor all insertion sites, i e  indwelling lines, tubes, and drains  - Monitor endotracheal if appropriate and nasal secretions for changes in amount and color  - [place] appropriate cooling/warming therapies per order  - Administer medications as ordered  - Instruct and encourage patient and family to use good hand hygiene technique  - Identify and instruct in appropriate isolation precautions for identified infection/condition  Outcome: Progressing     Problem: SAFETY ADULT  Goal: Patient will remain free of falls  Description  INTERVENTIONS:  - Assess patient frequently for physical needs  -  Identify cognitive and physical deficits and behaviors that affect risk of falls    -  [place] fall precautions as indicated by assessment   - Educate patient/family on patient safety including physical limitations  - Instruct patient to call for assistance with activity based on assessment  - Modify environment to reduce risk of injury  - Consider OT/PT consult to assist with strengthening/mobility  Outcome: Progressing     Problem: RESPIRATORY - ADULT  Goal: Achieves optimal ventilation and oxygenation  Description  INTERVENTIONS:  - Assess for changes in respiratory status  - Assess for changes in mentation and behavior  - Position to facilitate oxygenation and minimize respiratory effort  - Oxygen administered by appropriate delivery if ordered  - Initiate smoking cessation education as indicated  - Encourage broncho-pulmonary hygiene including cough, deep breathe, Incentive Spirometry  - Assess the need for suctioning and aspirate as needed  - Assess and instruct to report SOB or any respiratory difficulty  - Respiratory Therapy support as indicated  Outcome: Progressing     Problem: SKIN/TISSUE INTEGRITY - ADULT  Goal: Skin integrity remains intact  Description  INTERVENTIONS  - Identify patients at risk for skin breakdown  - Assess and monitor skin integrity  - Assess and monitor nutrition and hydration status  - Monitor labs (i e  albumin)  - Assess for incontinence   - Turn and reposition patient  - Assist with mobility/ambulation  - Relieve pressure over bony prominences  - Avoid friction and shearing  - Provide appropriate hygiene as needed including keeping skin clean and dry  - Evaluate need for skin moisturizer/barrier cream  - Collaborate with interdisciplinary team (i e  Nutrition, Rehabilitation, etc )   - Patient/family teaching  Outcome: Progressing     Problem: HEMATOLOGIC - ADULT  Goal: Maintains hematologic stability  Description  INTERVENTIONS  - Assess for signs and symptoms of bleeding or hemorrhage  - Monitor labs  - Administer supportive blood products/factors as ordered and appropriate  Outcome: Progressing     Problem: METABOLIC, FLUID AND ELECTROLYTES - ADULT  Goal: Electrolytes maintained within normal limits  Description  INTERVENTIONS:  - Monitor labs and assess patient for signs and symptoms of electrolyte imbalances  - Administer electrolyte replacement as ordered  - Monitor response to electrolyte replacements, including repeat lab results as appropriate  - Instruct patient on fluid and nutrition as appropriate  Outcome: Progressing  Goal: Fluid balance maintained  Description  INTERVENTIONS:  - Monitor labs   - Monitor I/O and WT  - Instruct patient on fluid and nutrition as appropriate  - Assess for signs & symptoms of volume excess or deficit  Outcome: Progressing  Goal: Glucose maintained within target range  Description  INTERVENTIONS:  - Monitor Blood Glucose as ordered  - Assess for signs and symptoms of hyperglycemia and hypoglycemia  - Administer ordered medications to maintain glucose within target range  - Assess nutritional intake and initiate nutrition service referral as needed  Outcome: Progressing     Problem: Prexisting or High Potential for Compromised Skin Integrity  Goal: Skin integrity is maintained or improved  Description  INTERVENTIONS:  - Identify patients at risk for skin breakdown  - Assess and monitor skin integrity  - Assess and monitor nutrition and hydration status  - Monitor labs   - Assess for incontinence   - Turn and reposition patient  - Assist with mobility/ambulation  - Relieve pressure over bony prominences  - Avoid friction and shearing  - Provide appropriate hygiene as needed including keeping skin clean and dry  - Evaluate need for skin moisturizer/barrier cream  - Collaborate with interdisciplinary team   - Patient/family teaching  - Consider wound care consult Outcome: Progressing     Problem: DISCHARGE PLANNING  Goal: Discharge to home or other facility with appropriate resources  Description  INTERVENTIONS:  - Identify barriers to discharge w/patient and caregiver  - Arrange for needed discharge resources and transportation as appropriate  - Identify discharge learning needs (meds, wound care, etc )  - Arrange for interpretive services to assist at discharge as needed  - Refer to Case Management Department for coordinating discharge planning if the patient needs post-hospital services based on physician/advanced practitioner order or complex needs related to functional status, cognitive ability, or social support system  Outcome: Progressing     Problem: CARDIOVASCULAR - ADULT  Goal: Maintains optimal cardiac output and hemodynamic stability  Description  INTERVENTIONS:  - Monitor I/O, vital signs and rhythm  - Monitor for S/S and trends of decreased cardiac output  - Administer and titrate ordered vasoactive medications to optimize hemodynamic stability  - Assess quality of pulses, skin color and temperature  - Assess for signs of decreased coronary artery perfusion  - Instruct patient to report change in severity of symptoms  Outcome: Progressing

## 2020-02-11 NOTE — NURSING NOTE
Report called to the [address], spoke with [place] Ronda  AVS and scripts faxed to facility and given to transport team Patient aware and agreeable  Meclizine given prior to transport r/t car sickness

## 2020-02-11 NOTE — QUICK NOTE
Hospice scripts for Fentanyl and Oxycodone provided      Siva Wilkinson DO  Palliative and Supportive Care  [phone]